# Patient Record
Sex: MALE | Race: WHITE | NOT HISPANIC OR LATINO | Employment: OTHER | URBAN - METROPOLITAN AREA
[De-identification: names, ages, dates, MRNs, and addresses within clinical notes are randomized per-mention and may not be internally consistent; named-entity substitution may affect disease eponyms.]

---

## 2024-08-30 ENCOUNTER — TRANSFERRED RECORDS (OUTPATIENT)
Dept: HEALTH INFORMATION MANAGEMENT | Facility: CLINIC | Age: 84
End: 2024-08-30
Payer: COMMERCIAL

## 2024-09-03 ENCOUNTER — TRANSFERRED RECORDS (OUTPATIENT)
Dept: HEALTH INFORMATION MANAGEMENT | Facility: CLINIC | Age: 84
End: 2024-09-03
Payer: COMMERCIAL

## 2024-09-05 ENCOUNTER — HOSPITAL ENCOUNTER (INPATIENT)
Facility: CLINIC | Age: 84
Setting detail: SURGERY ADMIT
DRG: 459 | End: 2024-09-05
Attending: NEUROLOGICAL SURGERY | Admitting: NEUROLOGICAL SURGERY
Payer: COMMERCIAL

## 2024-09-05 ENCOUNTER — HOSPITAL ENCOUNTER (INPATIENT)
Facility: CLINIC | Age: 84
LOS: 19 days | Discharge: NURSING, REHAB OR LONG-TERM CARE | DRG: 459 | End: 2024-09-24
Attending: NEUROLOGICAL SURGERY | Admitting: NEUROLOGICAL SURGERY
Payer: COMMERCIAL

## 2024-09-05 DIAGNOSIS — S22.079A CLOSED FRACTURE OF NINTH THORACIC VERTEBRA, UNSPECIFIED FRACTURE MORPHOLOGY, INITIAL ENCOUNTER (H): Primary | ICD-10-CM

## 2024-09-05 DIAGNOSIS — Z98.1 S/P FUSION OF THORACIC SPINE: Primary | ICD-10-CM

## 2024-09-05 DIAGNOSIS — E08.00 DIABETES MELLITUS DUE TO UNDERLYING CONDITION WITH HYPEROSMOLARITY WITHOUT COMA, WITHOUT LONG-TERM CURRENT USE OF INSULIN (H): ICD-10-CM

## 2024-09-05 PROBLEM — S22.000A THORACIC COMPRESSION FRACTURE (H): Status: ACTIVE | Noted: 2024-09-05

## 2024-09-05 LAB
ABO/RH(D): NORMAL
ANTIBODY SCREEN: NEGATIVE
APTT PPP: 29 SECONDS (ref 22–38)
FIBRINOGEN PPP-MCNC: 507 MG/DL (ref 170–510)
INR PPP: 1.02 (ref 0.85–1.15)
LACTATE SERPL-SCNC: 1.3 MMOL/L (ref 0.7–2)
SPECIMEN EXPIRATION DATE: NORMAL

## 2024-09-05 PROCEDURE — 83605 ASSAY OF LACTIC ACID: CPT | Performed by: NEUROLOGICAL SURGERY

## 2024-09-05 PROCEDURE — 86901 BLOOD TYPING SEROLOGIC RH(D): CPT

## 2024-09-05 PROCEDURE — 99222 1ST HOSP IP/OBS MODERATE 55: CPT | Mod: GC | Performed by: NEUROLOGICAL SURGERY

## 2024-09-05 PROCEDURE — 250N000013 HC RX MED GY IP 250 OP 250 PS 637

## 2024-09-05 PROCEDURE — 258N000003 HC RX IP 258 OP 636

## 2024-09-05 PROCEDURE — 85610 PROTHROMBIN TIME: CPT

## 2024-09-05 PROCEDURE — 86900 BLOOD TYPING SEROLOGIC ABO: CPT

## 2024-09-05 PROCEDURE — 120N000002 HC R&B MED SURG/OB UMMC

## 2024-09-05 PROCEDURE — 36415 COLL VENOUS BLD VENIPUNCTURE: CPT

## 2024-09-05 PROCEDURE — 85730 THROMBOPLASTIN TIME PARTIAL: CPT

## 2024-09-05 PROCEDURE — 85384 FIBRINOGEN ACTIVITY: CPT

## 2024-09-05 RX ORDER — NALOXONE HYDROCHLORIDE 0.4 MG/ML
0.2 INJECTION, SOLUTION INTRAMUSCULAR; INTRAVENOUS; SUBCUTANEOUS
Status: DISCONTINUED | OUTPATIENT
Start: 2024-09-05 | End: 2024-09-24 | Stop reason: HOSPADM

## 2024-09-05 RX ORDER — ACETAMINOPHEN 325 MG/1
975 TABLET ORAL EVERY 8 HOURS
Status: COMPLETED | OUTPATIENT
Start: 2024-09-05 | End: 2024-09-08

## 2024-09-05 RX ORDER — AMOXICILLIN 250 MG
1 CAPSULE ORAL 2 TIMES DAILY
Status: DISCONTINUED | OUTPATIENT
Start: 2024-09-05 | End: 2024-09-06

## 2024-09-05 RX ORDER — NALOXONE HYDROCHLORIDE 0.4 MG/ML
0.4 INJECTION, SOLUTION INTRAMUSCULAR; INTRAVENOUS; SUBCUTANEOUS
Status: DISCONTINUED | OUTPATIENT
Start: 2024-09-05 | End: 2024-09-24 | Stop reason: HOSPADM

## 2024-09-05 RX ORDER — PROCHLORPERAZINE MALEATE 5 MG
5 TABLET ORAL EVERY 6 HOURS PRN
Status: DISCONTINUED | OUTPATIENT
Start: 2024-09-05 | End: 2024-09-09

## 2024-09-05 RX ORDER — ONDANSETRON 2 MG/ML
4 INJECTION INTRAMUSCULAR; INTRAVENOUS EVERY 6 HOURS PRN
Status: DISCONTINUED | OUTPATIENT
Start: 2024-09-05 | End: 2024-09-09

## 2024-09-05 RX ORDER — ACETAMINOPHEN 325 MG/1
650 TABLET ORAL EVERY 4 HOURS PRN
Status: DISCONTINUED | OUTPATIENT
Start: 2024-09-08 | End: 2024-09-09

## 2024-09-05 RX ORDER — HYDRALAZINE HYDROCHLORIDE 20 MG/ML
10-20 INJECTION INTRAMUSCULAR; INTRAVENOUS EVERY 30 MIN PRN
Status: DISCONTINUED | OUTPATIENT
Start: 2024-09-05 | End: 2024-09-13

## 2024-09-05 RX ORDER — METHOCARBAMOL 500 MG/1
500 TABLET, FILM COATED ORAL EVERY 6 HOURS PRN
Status: DISCONTINUED | OUTPATIENT
Start: 2024-09-05 | End: 2024-09-06

## 2024-09-05 RX ORDER — ONDANSETRON 4 MG/1
4 TABLET, ORALLY DISINTEGRATING ORAL EVERY 6 HOURS PRN
Status: DISCONTINUED | OUTPATIENT
Start: 2024-09-05 | End: 2024-09-09

## 2024-09-05 RX ORDER — HYDROMORPHONE HCL IN WATER/PF 6 MG/30 ML
0.4 PATIENT CONTROLLED ANALGESIA SYRINGE INTRAVENOUS
Status: DISCONTINUED | OUTPATIENT
Start: 2024-09-05 | End: 2024-09-11

## 2024-09-05 RX ORDER — LABETALOL HYDROCHLORIDE 5 MG/ML
10-20 INJECTION, SOLUTION INTRAVENOUS EVERY 10 MIN PRN
Status: DISCONTINUED | OUTPATIENT
Start: 2024-09-05 | End: 2024-09-13

## 2024-09-05 RX ORDER — FAMOTIDINE 20 MG/1
20 TABLET, FILM COATED ORAL 2 TIMES DAILY PRN
Status: DISCONTINUED | OUTPATIENT
Start: 2024-09-05 | End: 2024-09-24 | Stop reason: HOSPADM

## 2024-09-05 RX ORDER — BISACODYL 10 MG
10 SUPPOSITORY, RECTAL RECTAL ONCE
Status: COMPLETED | OUTPATIENT
Start: 2024-09-05 | End: 2024-09-06

## 2024-09-05 RX ORDER — BISACODYL 10 MG
10 SUPPOSITORY, RECTAL RECTAL DAILY PRN
Status: DISCONTINUED | OUTPATIENT
Start: 2024-09-05 | End: 2024-09-07

## 2024-09-05 RX ORDER — OXYCODONE HYDROCHLORIDE 5 MG/1
5 TABLET ORAL EVERY 4 HOURS PRN
Status: DISCONTINUED | OUTPATIENT
Start: 2024-09-05 | End: 2024-09-09

## 2024-09-05 RX ORDER — BISACODYL 10 MG
10 SUPPOSITORY, RECTAL RECTAL DAILY PRN
Status: DISCONTINUED | OUTPATIENT
Start: 2024-09-08 | End: 2024-09-24 | Stop reason: HOSPADM

## 2024-09-05 RX ORDER — HYDROMORPHONE HCL IN WATER/PF 6 MG/30 ML
0.2 PATIENT CONTROLLED ANALGESIA SYRINGE INTRAVENOUS
Status: DISCONTINUED | OUTPATIENT
Start: 2024-09-05 | End: 2024-09-11

## 2024-09-05 RX ORDER — SODIUM CHLORIDE 9 MG/ML
INJECTION, SOLUTION INTRAVENOUS CONTINUOUS
Status: ACTIVE | OUTPATIENT
Start: 2024-09-05 | End: 2024-09-06

## 2024-09-05 RX ORDER — GABAPENTIN 100 MG/1
100 CAPSULE ORAL AT BEDTIME
Status: DISCONTINUED | OUTPATIENT
Start: 2024-09-05 | End: 2024-09-12

## 2024-09-05 RX ORDER — OXYCODONE HYDROCHLORIDE 10 MG/1
10 TABLET ORAL EVERY 4 HOURS PRN
Status: DISCONTINUED | OUTPATIENT
Start: 2024-09-05 | End: 2024-09-13

## 2024-09-05 RX ORDER — FAMOTIDINE 20 MG/1
20 TABLET, FILM COATED ORAL 2 TIMES DAILY
Status: DISCONTINUED | OUTPATIENT
Start: 2024-09-05 | End: 2024-09-05

## 2024-09-05 RX ORDER — POLYETHYLENE GLYCOL 3350 17 G/17G
17 POWDER, FOR SOLUTION ORAL DAILY
Status: DISCONTINUED | OUTPATIENT
Start: 2024-09-06 | End: 2024-09-06

## 2024-09-05 RX ADMIN — SODIUM CHLORIDE: 9 INJECTION, SOLUTION INTRAVENOUS at 21:31

## 2024-09-05 RX ADMIN — GABAPENTIN 100 MG: 100 CAPSULE ORAL at 23:57

## 2024-09-05 RX ADMIN — SENNOSIDES AND DOCUSATE SODIUM 1 TABLET: 8.6; 5 TABLET ORAL at 23:58

## 2024-09-05 RX ADMIN — OXYCODONE HYDROCHLORIDE 10 MG: 10 TABLET ORAL at 23:55

## 2024-09-05 RX ADMIN — ACETAMINOPHEN 975 MG: 325 TABLET ORAL at 23:38

## 2024-09-05 ASSESSMENT — ACTIVITIES OF DAILY LIVING (ADL)
ADLS_ACUITY_SCORE: 35

## 2024-09-05 NOTE — LETTER
Transition Communication Hand-off for Care Transitions to Next Level of Care Provider    Name: Judah Leon  : 1940  MRN #: [id number]  Primary Care Provider: [facility]     Primary Clinic: [address] [facility] [address]     Reason for Hospitalization:  Unstable Thoracic Fracture  Thoracic compression fracture (H)  Admit Date/Time: 2024  8:07 PM  Discharge Date: 2024  Payor Source: Payor: [facility] / Plan: [facility]N / Product Type: Indemnity /              Reason for Communication Hand-off Referral:   Notification of the discharge plan    Discharge Plan:          Care Management Discharge Note    Discharge Date: 2024       Discharge Disposition:  [facility] ([phone])    Discharge Services: Acute rehab placement at [facility]    Discharge DME:  Not applicable at this time      Discharge Transportation:    ANA spoke with pt's floor nurse (Leticia) who indicates that pt can transport via w/c, pt can pivot transfer, pt can maintain an upright seated position and pt does not require supervision for behavior during transport.  ANA arranged for [facility] ([employer] [phone]) to provide w/c transport with a service window  time of 12:08pm - 12:53pm    Private pay costs discussed: Not applicable at this time    Does the patient's insurance plan have a 3 day qualifying hospital stay waiver?  No    PAS Confirmation Code:   Not required as pt is admitting to acute rehab setting  Patient/family educated on Medicare website which has current facility and service quality ratings:  Yes    Education Provided on the Discharge Plan:  Yes  Persons Notified of Discharge Plans: Pt, pt's wife (Yolette),  pt's son (Eric), 6A nursing and Cash Crews NP  Patient/Family in Agreement with the Plan:  Yes    Handoff Referral Completed: Yes, non-[facility] PCP: External handoff communication completed    Additional Information:  - Cash Crews NP  "confirmed readiness for discharge  - Admissions ([place]) at [facility] confirmed acceptance for admit  - ANA spoke with Admissions at [facility] ([place]) who states that they are full on 9/24 and 9/25/2024 and thus are not able to accommodate pt for admit  - SW received \"Notice of Coverage\" approval from [facility] ([employer] processes prior approval requests for [employer]) for pt's acute rehab stay auth number: [id number].  ANA faxed a copy of the \"Notice of Coverage Approval \" to HIM for scanning into EPIC. ANA placed the original document in pt's [facility] chart.  SW provided a copy of the document to [facility] Admissions ([place]).    NANI Taveras  Social Work, 6A  Phone:  [phone]  Pager:  [phone]  9/24/2024       ERIC Moran    "

## 2024-09-06 ENCOUNTER — APPOINTMENT (OUTPATIENT)
Dept: CT IMAGING | Facility: CLINIC | Age: 84
DRG: 459 | End: 2024-09-06
Attending: NURSE PRACTITIONER
Payer: COMMERCIAL

## 2024-09-06 LAB
ANION GAP SERPL CALCULATED.3IONS-SCNC: 10 MMOL/L (ref 7–15)
BUN SERPL-MCNC: 25.6 MG/DL (ref 8–23)
CALCIUM SERPL-MCNC: 8.9 MG/DL (ref 8.8–10.4)
CHLORIDE SERPL-SCNC: 103 MMOL/L (ref 98–107)
CREAT SERPL-MCNC: 1.46 MG/DL (ref 0.67–1.17)
EGFRCR SERPLBLD CKD-EPI 2021: 47 ML/MIN/1.73M2
ERYTHROCYTE [DISTWIDTH] IN BLOOD BY AUTOMATED COUNT: 12.6 % (ref 10–15)
GLUCOSE BLDC GLUCOMTR-MCNC: 176 MG/DL (ref 70–99)
GLUCOSE BLDC GLUCOMTR-MCNC: 194 MG/DL (ref 70–99)
GLUCOSE BLDC GLUCOMTR-MCNC: 244 MG/DL (ref 70–99)
GLUCOSE SERPL-MCNC: 203 MG/DL (ref 70–99)
HCO3 SERPL-SCNC: 22 MMOL/L (ref 22–29)
HCT VFR BLD AUTO: 35.6 % (ref 40–53)
HGB BLD-MCNC: 12.3 G/DL (ref 13.3–17.7)
MAGNESIUM SERPL-MCNC: 1.7 MG/DL (ref 1.7–2.3)
MCH RBC QN AUTO: 33.2 PG (ref 26.5–33)
MCHC RBC AUTO-ENTMCNC: 34.6 G/DL (ref 31.5–36.5)
MCV RBC AUTO: 96 FL (ref 78–100)
PHOSPHATE SERPL-MCNC: 3.6 MG/DL (ref 2.5–4.5)
PLATELET # BLD AUTO: 151 10E3/UL (ref 150–450)
POTASSIUM SERPL-SCNC: 4.6 MMOL/L (ref 3.4–5.3)
RBC # BLD AUTO: 3.71 10E6/UL (ref 4.4–5.9)
SODIUM SERPL-SCNC: 135 MMOL/L (ref 135–145)
WBC # BLD AUTO: 7.3 10E3/UL (ref 4–11)

## 2024-09-06 PROCEDURE — 999N000128 HC STATISTIC PERIPHERAL IV START W/O US GUIDANCE

## 2024-09-06 PROCEDURE — 250N000013 HC RX MED GY IP 250 OP 250 PS 637: Performed by: NURSE PRACTITIONER

## 2024-09-06 PROCEDURE — 99231 SBSQ HOSP IP/OBS SF/LOW 25: CPT | Performed by: NURSE PRACTITIONER

## 2024-09-06 PROCEDURE — 36415 COLL VENOUS BLD VENIPUNCTURE: CPT

## 2024-09-06 PROCEDURE — 85027 COMPLETE CBC AUTOMATED: CPT

## 2024-09-06 PROCEDURE — 120N000002 HC R&B MED SURG/OB UMMC

## 2024-09-06 PROCEDURE — 80048 BASIC METABOLIC PNL TOTAL CA: CPT

## 2024-09-06 PROCEDURE — 83735 ASSAY OF MAGNESIUM: CPT

## 2024-09-06 PROCEDURE — 72128 CT CHEST SPINE W/O DYE: CPT | Mod: 26 | Performed by: RADIOLOGY

## 2024-09-06 PROCEDURE — 72128 CT CHEST SPINE W/O DYE: CPT

## 2024-09-06 PROCEDURE — 250N000013 HC RX MED GY IP 250 OP 250 PS 637

## 2024-09-06 PROCEDURE — 99222 1ST HOSP IP/OBS MODERATE 55: CPT | Performed by: PHYSICIAN ASSISTANT

## 2024-09-06 PROCEDURE — 250N000012 HC RX MED GY IP 250 OP 636 PS 637: Performed by: NURSE PRACTITIONER

## 2024-09-06 PROCEDURE — 93005 ELECTROCARDIOGRAM TRACING: CPT

## 2024-09-06 PROCEDURE — 84100 ASSAY OF PHOSPHORUS: CPT

## 2024-09-06 RX ORDER — GLIPIZIDE 5 MG/1
2.5 TABLET ORAL DAILY
Status: ON HOLD | COMMUNITY
End: 2024-09-19

## 2024-09-06 RX ORDER — AMITRIPTYLINE HYDROCHLORIDE 10 MG/1
10 TABLET ORAL AT BEDTIME
Status: DISCONTINUED | OUTPATIENT
Start: 2024-09-06 | End: 2024-09-24 | Stop reason: HOSPADM

## 2024-09-06 RX ORDER — BISACODYL 10 MG
10 SUPPOSITORY, RECTAL RECTAL ONCE
Status: COMPLETED | OUTPATIENT
Start: 2024-09-06 | End: 2024-09-06

## 2024-09-06 RX ORDER — MULTIPLE VITAMINS W/ MINERALS TAB 9MG-400MCG
1 TAB ORAL DAILY
Status: DISCONTINUED | OUTPATIENT
Start: 2024-09-06 | End: 2024-09-24 | Stop reason: HOSPADM

## 2024-09-06 RX ORDER — DEXTROSE MONOHYDRATE 25 G/50ML
25-50 INJECTION, SOLUTION INTRAVENOUS
Status: DISCONTINUED | OUTPATIENT
Start: 2024-09-06 | End: 2024-09-08

## 2024-09-06 RX ORDER — DIAZEPAM 5 MG
5 TABLET ORAL EVERY 6 HOURS PRN
Status: DISCONTINUED | OUTPATIENT
Start: 2024-09-06 | End: 2024-09-24 | Stop reason: HOSPADM

## 2024-09-06 RX ORDER — METHOCARBAMOL 500 MG/1
500 TABLET, FILM COATED ORAL 4 TIMES DAILY
Status: DISCONTINUED | OUTPATIENT
Start: 2024-09-06 | End: 2024-09-24 | Stop reason: HOSPADM

## 2024-09-06 RX ORDER — VITAMIN B COMPLEX
25 TABLET ORAL DAILY
Status: DISCONTINUED | OUTPATIENT
Start: 2024-09-06 | End: 2024-09-24 | Stop reason: HOSPADM

## 2024-09-06 RX ORDER — AMOXICILLIN 250 MG
2 CAPSULE ORAL 2 TIMES DAILY
Status: DISCONTINUED | OUTPATIENT
Start: 2024-09-06 | End: 2024-09-09

## 2024-09-06 RX ORDER — METFORMIN HCL 500 MG
500 TABLET, EXTENDED RELEASE 24 HR ORAL 2 TIMES DAILY WITH MEALS
Status: ON HOLD | COMMUNITY
End: 2024-09-19

## 2024-09-06 RX ORDER — POLYETHYLENE GLYCOL 3350 17 G/17G
17 POWDER, FOR SOLUTION ORAL 2 TIMES DAILY
Status: DISCONTINUED | OUTPATIENT
Start: 2024-09-06 | End: 2024-09-10

## 2024-09-06 RX ORDER — VITAMIN B COMPLEX
1 TABLET ORAL DAILY
Status: ON HOLD | COMMUNITY
End: 2024-09-19

## 2024-09-06 RX ORDER — SIMVASTATIN 40 MG
40 TABLET ORAL AT BEDTIME
Status: DISCONTINUED | OUTPATIENT
Start: 2024-09-06 | End: 2024-09-24 | Stop reason: HOSPADM

## 2024-09-06 RX ORDER — AMITRIPTYLINE HYDROCHLORIDE 10 MG/1
10 TABLET ORAL EVERY EVENING
Status: ON HOLD | COMMUNITY
End: 2024-09-19

## 2024-09-06 RX ORDER — METFORMIN HCL 500 MG
500 TABLET, EXTENDED RELEASE 24 HR ORAL 2 TIMES DAILY WITH MEALS
Status: DISCONTINUED | OUTPATIENT
Start: 2024-09-06 | End: 2024-09-24 | Stop reason: HOSPADM

## 2024-09-06 RX ORDER — MULTIPLE VITAMINS W/ MINERALS TAB 9MG-400MCG
1 TAB ORAL DAILY
Status: ON HOLD | COMMUNITY
End: 2024-09-19

## 2024-09-06 RX ORDER — SIMVASTATIN 40 MG
40 TABLET ORAL EVERY EVENING
Status: ON HOLD | COMMUNITY
End: 2024-09-19

## 2024-09-06 RX ORDER — NICOTINE POLACRILEX 4 MG
15-30 LOZENGE BUCCAL
Status: DISCONTINUED | OUTPATIENT
Start: 2024-09-06 | End: 2024-09-08

## 2024-09-06 RX ADMIN — INSULIN ASPART 1 UNITS: 100 INJECTION, SOLUTION INTRAVENOUS; SUBCUTANEOUS at 18:34

## 2024-09-06 RX ADMIN — ACETAMINOPHEN 975 MG: 325 TABLET ORAL at 16:04

## 2024-09-06 RX ADMIN — METHOCARBAMOL 500 MG: 500 TABLET ORAL at 20:21

## 2024-09-06 RX ADMIN — SIMVASTATIN 40 MG: 40 TABLET, FILM COATED ORAL at 21:49

## 2024-09-06 RX ADMIN — GABAPENTIN 100 MG: 100 CAPSULE ORAL at 21:49

## 2024-09-06 RX ADMIN — SENNOSIDES AND DOCUSATE SODIUM 2 TABLET: 8.6; 5 TABLET ORAL at 20:21

## 2024-09-06 RX ADMIN — SENNOSIDES AND DOCUSATE SODIUM 1 TABLET: 8.6; 5 TABLET ORAL at 07:43

## 2024-09-06 RX ADMIN — POLYETHYLENE GLYCOL 3350 17 G: 17 POWDER, FOR SOLUTION ORAL at 20:20

## 2024-09-06 RX ADMIN — POLYETHYLENE GLYCOL 3350 17 G: 17 POWDER, FOR SOLUTION ORAL at 08:58

## 2024-09-06 RX ADMIN — AMITRIPTYLINE HYDROCHLORIDE 10 MG: 10 TABLET, FILM COATED ORAL at 21:48

## 2024-09-06 RX ADMIN — BISACODYL 10 MG: 10 SUPPOSITORY RECTAL at 21:50

## 2024-09-06 RX ADMIN — METHOCARBAMOL 500 MG: 500 TABLET ORAL at 16:04

## 2024-09-06 RX ADMIN — ACETAMINOPHEN 975 MG: 325 TABLET ORAL at 07:43

## 2024-09-06 RX ADMIN — METHOCARBAMOL 500 MG: 500 TABLET ORAL at 12:03

## 2024-09-06 ASSESSMENT — ACTIVITIES OF DAILY LIVING (ADL)
ADLS_ACUITY_SCORE: 30
ADLS_ACUITY_SCORE: 31
ADLS_ACUITY_SCORE: 35
ADLS_ACUITY_SCORE: 31
ADLS_ACUITY_SCORE: 31
ADLS_ACUITY_SCORE: 30
ADLS_ACUITY_SCORE: 31
ADLS_ACUITY_SCORE: 43
ADLS_ACUITY_SCORE: 31
ADLS_ACUITY_SCORE: 43
ADLS_ACUITY_SCORE: 43
ADLS_ACUITY_SCORE: 31
ADLS_ACUITY_SCORE: 35
ADLS_ACUITY_SCORE: 35
ADLS_ACUITY_SCORE: 31
ADLS_ACUITY_SCORE: 31
ADLS_ACUITY_SCORE: 43
ADLS_ACUITY_SCORE: 31
ADLS_ACUITY_SCORE: 43
ADLS_ACUITY_SCORE: 30
ADLS_ACUITY_SCORE: 30
ADLS_ACUITY_SCORE: 31
ADLS_ACUITY_SCORE: 31

## 2024-09-06 NOTE — PLAN OF CARE
[facility]  Belongings/meds: with pt in room  2 RN Skin Assessment Completed by: Emir Bentley  Non-intact findings documented (yes/no/NA): bony heels, mepilex in place    Status: Admitted for unstable  T 8-9 fracture after a fall. PMHx of stage III CKD, diabetes, HTN, prostate cancer, anemia  Vitals: HTN, has PRN available for SBP <180  Neuros: A&O x4. Denies N/T. [place], tinnitus in R ear. 5/5 strengths throughout  IV: PIV infusing NS @ 75 ml/hr  Labs/Electrolytes: Triggered sepsis, Lactic acid 1.3  Resp/trach: on RA, denies SOB  Diet: Regular  Bowel status: LBM PTA, passing gas, suppository ordered but pt refused  : voiding spontaneously  Skin: intact except bony heels, mepilex in place    Pain: back and shoulder pain managed with schedule meds and PRN oxycodone  Activity: SBA/GB, TLSO brace on at all times  Plan: T 7-11 fusion Monday, 9/9 at 7:30AM. Continue with pain management

## 2024-09-06 NOTE — PROGRESS NOTES
[facility], [place]   Neurosurgery Progress Note:    Date of service: 9/6/2024    Assessment: Judah Leon is a 84 year old-year-old male with a notable PMHx of stage IIIb chronic kidney disease, diabetes, hypertension, history of prostate cancer, anemia, presenting with unstable T 8-9 fracture. Patient was in his usual state of health and was with his friend on Thursday. Mr. Leon saw his friend start to lose his balance, so he ran to help. Unfortunately, his friend ended up landing on top of the Mr. Leon and fractured his spine at T8-9. He denied loss of consciousness. He denies significant weakness, only endorses sharp pain that begins in his midback and radiates across his abdomen. He denies changes in sensation. He denies incontinence, bowel/bladder symptoms, and saddle anesthesia. [facility] for surgical management.      Plan:  - Neuro checks/vital signs: Every 4 hours  - Pain control  - Activity: TLSO brace at all times.  Okay to be up to chair only.  No waking   - Restrictions/Bracing: TLSO brace at all times  - Diet: Regular  - DVT prophylaxis: SCDs  - MRI Thoracic Spine w/wo today  - CT Thoracic spine today  - PT/OT: ordered  - Plan to obtain imaging from outside hospital  - Medicine pre-operative evaluation - appreciate recommendations  - Scheduled bowel regimen  - Electrolyte replacement protocol  - Daily labs  - Insulin sliding scale  - Platelets > 100,000  - INR < 1.5  - Hemoglobin > 8  - Tentative plan for OR on Monday for T7-11 Percutaneous Screws, possible additional levels with Dr. Anayeli Crews, APRN, CNP  Neurosurgery  Pager [phone]    Interval History:  Patient reports pain controlled with current regimen.  TLSO at all times. Medicine consult today. MRI and CT thoracic spine ordered.       Objective:   Temp:  [97.7  F (36.5  C)-98.4  F (36.9  C)] 97.9  F (36.6  C)  Pulse:  [80-95] 80  Resp:  [15-18]  16  BP: (148-175)/(92-99) 159/92  SpO2:  [91 %-98 %] 91 %  No intake/output data recorded.    Gen: Appears comfortable, NAD  Neurologic:  - Alert & Oriented to person, place, time, and situation  - Follows commands briskly  - Speech fluent, spontaneous. No aphasia or dysarthria.  - No gaze preference. No apparent hemineglect.  - PERRL, EOMI  - Strong eye closure, jaw clench, and cheek puff  - Face symmetric with sensation intact to light touch  - Palate elevates symmetrically, uvula midline, tongue protrudes midline  - Trapezii and sternocleidomastoid muscles 5/5 bilaterally  - No pronator drift     Del Tr Bi WE WF Gr   R 5 5 5 5 5 5   L 5 5 5 5 5 5    HF KE KF DF PF EHL   R 5 5 5 5 5 5   L 5 5 5 5 5 5   *exam components can be pain limited depending on acute pain episodes triggered with movements of his back   Reflexes 2+ throughout    Sensation intact and symmetric to light touch throughout    LABS  Recent Labs   Lab Test 09/06/24  0518   WBC 7.3   HGB 12.3*   MCV 96          Recent Labs   Lab Test 09/06/24  0518      POTASSIUM 4.6   CHLORIDE 103   CO2 22   BUN 25.6*   CR 1.46*   ANIONGAP 10   ROB 8.9   *       IMAGING    No results found for this or any previous visit (from the past 24 hour(s)).

## 2024-09-06 NOTE — PHARMACY-ADMISSION MEDICATION HISTORY
Pharmacist Admission Medication History    Admission medication history is complete. The information provided in this note is only as accurate as the sources available at the time of the update.    Information Source(s): Facility (U/NH/) medication list/MAR and CareEverywhere/SureScripts via N/A    Pertinent Information:   -Wife provided med list from OSH dated 10/17/23  -Reviewed Care Everywhere notes    Changes made to PTA medication list:  Added:   Everything on the list below was added  Deleted: None  Changed: None    Allergies reviewed with patient and updates made in EHR: no    Medication History Completed By: Bull Douglass [facility] 9/6/2024 1:37 PM    PTA Med List   Medication Sig Last Dose    amitriptyline (ELAVIL) 10 MG tablet Take 10 mg by mouth every evening.     glipiZIDE (GLUCOTROL) 5 MG tablet Take 2.5 mg by mouth daily.     metFORMIN (GLUCOPHAGE XR) 500 MG 24 hr tablet Take 500 mg by mouth 2 times daily (with meals).     multivitamin w/minerals (THERA-VIT-M) tablet Take 1 tablet by mouth daily.     simvastatin (ZOCOR) 40 MG tablet Take 40 mg by mouth every evening.     Vitamin D3 (CHOLECALCIFEROL) 25 mcg (1000 units) tablet Take 1 tablet by mouth daily.

## 2024-09-06 NOTE — PLAN OF CARE
Goal Outcome Evaluation:      Plan of Care Reviewed With: patient    Overall Patient Progress: no changeOverall Patient Progress: no change    Outcome Evaluation: CT needed this shift    [facility]. PMHx of stage IIIb chronic kidney disease, diabetes, hypertension, history of prostate cancer, and anemia. Neuros intact ex numbness to his feet that has been ongoing. PIV-Sl'd. Voiding in urinal. Has not had a bowel movement in a week per pt; bowel meds given; commode at bedside. Pain managed with Robaxin& tylenol this shift. Up w/ A1/GB;Okay to transfer to chair or commode with assist.  TLSO brace on at all times. No walking. Up in chair x1 this shift. CT completed this shift. MRI needed this evening valium to be given 30 mins prior to MRI.

## 2024-09-06 NOTE — PLAN OF CARE
Status: Unstable T8-9 fracture. PMHx: stage III CKD, DM, HTN, prostate cancer, anemia.  Vitals: VSS on RA  Neuros: Numbness to bilat feet that pt reports is baseline. 5/5 strengths thru out.  IV: PIV SL  Labs/Electrolytes: BG ACHS  Resp/trach: WNL - denies SOB.  Diet: Reg - good intake.  Bowel status: Per pt report: hasn't had BM for a week. Sched bowel meds & suppository given this gualberto.   : Voids spont to bedside urinal  Skin: Mild redness - blanchable to coccyx. Bruising thru out. Wears TLSO at all times as ordered.  Pain: 4/10 back pain - trx w/ sched meds.  Activity: A1/GB/FWW to chair & commode - no walking per order.  Social: Family at bedside thru out gualberto & supportive.  Plan: [facility] - unable to complete this gualberto d/t scheduling limitations per MRI.  Education completed: Pressure ulcer prevention and repositioning strategies.    Goal Outcome Evaluation:      Plan of Care Reviewed With: patient    Overall Patient Progress: no change    Outcome Evaluation: MRI needed this shift.

## 2024-09-06 NOTE — H&P
"[facility]         NEUROSURGERY HISTORY AND PHYSICAL    HPI:  Judah Leon is a 84 year old-year-old male with a notable PMHx of stage IIIb chronic kidney disease, diabetes, hypertension, history of prostate cancer, anemia, presenting with unstable T 8-9 fracture.    Patient was in his usual state of health and was with his friend on Thursday. Mr. Leon saw his friend start to lose his balance, so he ran to help. Unfortunately, his friend ended up landing on top of the Mr. Leon and fractured his spine at T8-9. He denied loss of consciousness. He denies significant weakness, only endorses sharp pain that begins in his midback and radiates across his abdomen. He denies changes in sensation. He denies incontinence, bowel/bladder symptoms, and saddle anesthesia. [facility] for surgical management.    ROS: 10 point ROS in other systems negative other than noted in HPI.    RELEVANT HISTORY:  PAST MEDICAL HISTORY: No past medical history on file.    PAST SURGICAL HISTORY: No past surgical history on file.    FAMILY HISTORY: No family history on file.    SOCIAL HISTORY:   Social History     Tobacco Use    Smoking status: Not on file    Smokeless tobacco: Not on file   Substance Use Topics    Alcohol use: Not on file       MEDICATIONS:  No current outpatient medications on file.       Allergies:  No Known Allergies    IMAGING:  No results found for this or any previous visit (from the past 24 hour(s)).    LABS:   Last Comprehensive Metabolic Panel:  Lab Results   Component Value Date     09/06/2024    POTASSIUM 4.6 09/06/2024    CHLORIDE 103 09/06/2024    CO2 22 09/06/2024    ANIONGAP 10 09/06/2024     (H) 09/06/2024    BUN 25.6 (H) 09/06/2024    CR 1.46 (H) 09/06/2024    GFRESTIMATED 47 (L) 09/06/2024    ROB 8.9 09/06/2024         No results found for: \"WBC\"  No results found for: \"RBC\"  No results found " "for: \"HGB\"  No results found for: \"HCT\"  No results found for: \"MCV\"  No results found for: \"MCH\"  No results found for: \"MCHC\"  No results found for: \"RDW\"  No results found for: \"PLT\"  INR   Date Value Ref Range Status   09/05/2024 1.02 0.85 - 1.15 Final      aPTT   Date Value Ref Range Status   09/05/2024 29 22 - 38 Seconds Final        PHYSICAL EXAM:  Blood pressure (!) 159/92, pulse 80, temperature 97.9  F (36.6  C), temperature source Oral, resp. rate 16, SpO2 91%.  General: Resting in bed, in no acute distress, in TLSO brace  HEENT: Atraumatic, normocephalic  PULM: Breathing comfortably on room air  MSK: No gross deformities  : rectal tone deferred  NEUROLOGIC:  -- Awake; Alert; oriented x 3  -- Follows commands briskly  -- Speech fluent, spontaneous. No aphasia or dysarthria.  -- no gaze preference. No apparent hemineglect.  Cranial Nerves:  -- visual fields full to confrontation, PERRL 3-2mm bilat and brisk, extraocular movements intact  -- face symmetrical, tongue midline  -- sensory V1-V3 intact bilaterally  -- palate elevates symmetrically, uvula midline  -- hearing grossly intact bilat  -- Trapezii 5/5 strength bilat symmetric    Motor:  Normal bulk / tone; no tremor, rigidity, or bradykinesia.  No muscle wasting or fasciculations  No Pronator Drift     Delt Bi Tri Hand Flexion/  Extension Iliopsoas Quadriceps Hamstrings Tibialis Anterior Gastroc    C5 C6 C7 C8/T1 L2 L3 L4-S1 L4 S1   R 5 5 5 5 5 5 5 5 5   L 5 5 5 5 5 5 5 5 5   *exam components can be pain limited depending on acute pain episodes triggered with movements of his back      Sensory:  intact to LT x 4 extremities - abdomen was not tested as patient had intense pain with manipulation of the TLSO brace, though patient reported that there was no difference before the brace was put on 2 days prior    Reflexes: no clonus       Bi Tri BR Steve Pat Ach Bab     C5-6 C7-8 C6 UMN L2-4 S1 UMN   R 2+ 2+ 2+ Norm 2+ 2+ Norm   L 2+ 2+ 2+ Norm 2+ 2+ Norm "      Gait: deferred     ASSESSMENT:  Judah Leon is a 84 year old-year-old male with a notable PMHx of stage IIIb chronic kidney disease, diabetes, hypertension, history of prostate cancer, anemia, presenting with unstable T 8-9 fracture.    PLAN:  -- OR on Monday for T7-11 Percutaneous Screws, possible additional levels  -- TLSO on at all times  -- Obtain imaging from outside hospital  -- Medicine pre-operative evaluation  -- Pain control  -- SBP < 180  -- Supplemental oxygen PRN  -- Incentive spirometry Q1H while awake  -- Continuous pulse oximetry  -- Advance diet as tolerated to regular  -- Scheduled bowel regimen  -- Electrolyte replacement protocol  -- Glucose < 180    -- Continue glucose checks  -- Platelets > 100,000  -- INR < 1.5  -- Hemoglobin > 8  -- DVT: SCDs while in bed  -- Disposition: Floor  -- PT/OT consulted    The patient's presentation, exam, and imaging and the listed recommendations/plan were discussed with Dr. Gama, neurosurgery chief resident, and Dr. Guadalupe, neurosurgery staff.    Ranjith Hernandez MD  Department of Neurosurgery  [facility]

## 2024-09-06 NOTE — CONSULTS
[facility]  Consult Note - Hospitalist Service, GOLD TEAM   Date of Admission:  9/5/2024  Consult Requested by: Dr. Hernandez  Reason for Consult: Pre-operative evaluation    Assessment & Plan   Judah Leon is a 84 year old man with a history of CKD, HTN, HLD, DM2, hemorrhagic CVA (1997), prostate cancer, who was admitted to Neurosurgery service on 9/5/24 with unstable T8-9 fracture. Medicine consulted today for pre-operative evaluation.     Traumatic unstable T8-9 fracture  Mechanical fall  Vertigo  Having intermittent episodes of vertigo for at least the past year. [facility] PCP recently started amitriptyline. Now presents with unstable T8-9 fracture after a mechanical fall during a bout of dizziness.   - Management per Neurosurgery primary team   - Planning for OR on 9/9   - Consider resuming PTA amitriptyline    Preoperative risk assessment: EKG with NSR, no ischemic changes. Reviewed most recent echo from 2019 which showed LVEf 60%, no inducible ischemic on exercise stress. Functional capacity is >4 METs without symptoms. Patient's cardiac risk by revised cardiac risk index is 1.0%. ACS NSQIP lists their  cardiac risk similarly at 1.6%. Their risk of any complication is 17.7%, serious complications 14.2%, death 2.3%. They have not not required urgent intubation in the past.  It is certainly possible that they might require a longer time to recover from anesthesia than normal. Patient is currently euvolemic and is not on supplemental oxygen.    -  I do not think that any further testing is indicated at this time as all of their chronic issues seem to be maximized.    - I discussed my recommendations for no further testing or change in treatment prior to proceeding with surgery.     DM2: A1C 7.7. PTA on metformin 500 mg BID, glipizide 2.5 mg daily,    - Hold glipizide, metformin while inpatient and resume upon discharge   - Low intensity sliding  scale aspart   - BG checks TID ac, at bedtime, 0200   - Hypoglycemia protocol    Hx of hypertension: No longer on antihypertensive medications. Blood pressure currently elevated in setting of pain and significant anxiety.   - Would hold on initiating scheduled antihypertensives so long as SBP remaining <170 and focus on pain/anxiety mgmt.     CKD IIIb: Baseline Cr ~1.6 - 1.7, currently stable. Monitor BMP daily.     HLD: Continue PTA simvastatin 40 mg at bedtime.              Recommendations discussed with Cash Crews CNP. Medicine will sign off. Please do not hesitate to contact if new questions or concerns arise.       Clinically Significant Risk Factors Present on Admission                                           Sherry Amanda PA-C  Hospitalist Service, GOLD TEAM   Securely message with [employer] (more info)  Text page via [facility] Paging/Directory   See signed in provider for up to date coverage information  ______________________________________________________________________    Chief Complaint   Back pain    History is obtained from the patient    History of Present Illness   Judah Leon is a 84 year old man with a history of CKD, HTN, HLD, DM2, prostate cancer, who was admitted to Neurosurgery service on 9/5/24 with unstable T8-9 fracture. Medicine consulted today for pre-operative evaluation.     His primary concern is ability to tolerate today's spinal MRI. He has a history of significant claustrophobia and anxiety with MRI in the past. He is feeling very worried about this.     Having ongoing back pain. Getting relief with PRN pain medications.     Otherwise had been feeling well up until his fall with no recent illness, fevers. He is independent and active at baseline. No chest pain, dyspnea, or exertional symptoms. No prior history of issues with anesthesia. No personal or known family history of blood clots. Had a brain bleed of unknown etiology (? Aneurysm) >10 years ago. No other known history  of strokes or cardiovascular disease. He notes he has been off blood pressure meds for awhile and often gets white coat hypertension.      Past Medical History    No past medical history on file.    Past Surgical History   No past surgical history on file.    Medications   No medications prior to admission.          Physical Exam   Vital Signs: Temp: 97.9  F (36.6  C) Temp src: Oral BP: (!) 159/92 Pulse: 80   Resp: 16 SpO2: 91 % O2 Device: None (Room air)    Weight: 0 lbs 0 oz    Constitutional: Awake and alert, in no apparent distress. Supine in bed with brace on.  Eyes: Sclera clear, anicteric   Respiratory: Breathing comfortably on room air. Clear to auscultation bilaterally with no crackles, wheezing, or rhonchi. Good air entry throughout.   Cardiovascular:  RRR, normal S1/S2. No rubs or murmurs. Intact bilateral pedal pulses. No peripheral edema.   GI: Soft, non-tender, non-distended. Normoactive bowel sounds.   Skin:  Good color. No jaundice. No visible rashes, lesions, or bruising of concern.   Neurologic: Alert and fully oriented. No tremor. Facies symmetric. Speech clear.       Medical Decision Making       60 MINUTES SPENT BY ME on the date of service doing chart review, history, exam, documentation & further activities per the note.      Data   ROUTINE IP LABS (Last four results)  Recent Labs   Lab 09/06/24  0518      POTASSIUM 4.6   CHLORIDE 103   CO2 22   ANIONGAP 10   *   BUN 25.6*   CR 1.46*   ROB 8.9   MAG 1.7   PHOS 3.6     Recent Labs   Lab 09/06/24  0518   WBC 7.3   RBC 3.71*   HGB 12.3*   HCT 35.6*   MCV 96   MCH 33.2*   MCHC 34.6   RDW 12.6        Recent Labs   Lab 09/05/24  2100   INR 1.02            Latest Ref Rng & Units 9/6/2024     5:18 AM   Glucose Values   Glucose 70 - 99 mg/dL 203

## 2024-09-07 LAB
ANION GAP SERPL CALCULATED.3IONS-SCNC: 11 MMOL/L (ref 7–15)
BUN SERPL-MCNC: 28.7 MG/DL (ref 8–23)
CALCIUM SERPL-MCNC: 9.4 MG/DL (ref 8.8–10.4)
CHLORIDE SERPL-SCNC: 105 MMOL/L (ref 98–107)
CREAT SERPL-MCNC: 1.5 MG/DL (ref 0.67–1.17)
EGFRCR SERPLBLD CKD-EPI 2021: 45 ML/MIN/1.73M2
ERYTHROCYTE [DISTWIDTH] IN BLOOD BY AUTOMATED COUNT: 12.7 % (ref 10–15)
GLUCOSE BLDC GLUCOMTR-MCNC: 203 MG/DL (ref 70–99)
GLUCOSE BLDC GLUCOMTR-MCNC: 219 MG/DL (ref 70–99)
GLUCOSE BLDC GLUCOMTR-MCNC: 225 MG/DL (ref 70–99)
GLUCOSE BLDC GLUCOMTR-MCNC: 268 MG/DL (ref 70–99)
GLUCOSE BLDC GLUCOMTR-MCNC: 370 MG/DL (ref 70–99)
GLUCOSE SERPL-MCNC: 207 MG/DL (ref 70–99)
HCO3 SERPL-SCNC: 21 MMOL/L (ref 22–29)
HCT VFR BLD AUTO: 40.3 % (ref 40–53)
HGB BLD-MCNC: 13.6 G/DL (ref 13.3–17.7)
MAGNESIUM SERPL-MCNC: 1.9 MG/DL (ref 1.7–2.3)
MCH RBC QN AUTO: 32.8 PG (ref 26.5–33)
MCHC RBC AUTO-ENTMCNC: 33.7 G/DL (ref 31.5–36.5)
MCV RBC AUTO: 97 FL (ref 78–100)
PHOSPHATE SERPL-MCNC: 3.6 MG/DL (ref 2.5–4.5)
PLATELET # BLD AUTO: 158 10E3/UL (ref 150–450)
POTASSIUM SERPL-SCNC: 4.5 MMOL/L (ref 3.4–5.3)
RBC # BLD AUTO: 4.15 10E6/UL (ref 4.4–5.9)
SODIUM SERPL-SCNC: 137 MMOL/L (ref 135–145)
WBC # BLD AUTO: 8 10E3/UL (ref 4–11)

## 2024-09-07 PROCEDURE — A9585 GADOBUTROL INJECTION: HCPCS | Performed by: NURSE PRACTITIONER

## 2024-09-07 PROCEDURE — 85027 COMPLETE CBC AUTOMATED: CPT

## 2024-09-07 PROCEDURE — 250N000013 HC RX MED GY IP 250 OP 250 PS 637: Performed by: NURSE PRACTITIONER

## 2024-09-07 PROCEDURE — 84100 ASSAY OF PHOSPHORUS: CPT

## 2024-09-07 PROCEDURE — 250N000011 HC RX IP 250 OP 636

## 2024-09-07 PROCEDURE — 255N000002 HC RX 255 OP 636: Performed by: NURSE PRACTITIONER

## 2024-09-07 PROCEDURE — 36415 COLL VENOUS BLD VENIPUNCTURE: CPT

## 2024-09-07 PROCEDURE — 80048 BASIC METABOLIC PNL TOTAL CA: CPT

## 2024-09-07 PROCEDURE — 250N000013 HC RX MED GY IP 250 OP 250 PS 637

## 2024-09-07 PROCEDURE — 83735 ASSAY OF MAGNESIUM: CPT

## 2024-09-07 PROCEDURE — 120N000002 HC R&B MED SURG/OB UMMC

## 2024-09-07 RX ORDER — GADOBUTROL 604.72 MG/ML
0.1 INJECTION INTRAVENOUS ONCE
Status: COMPLETED | OUTPATIENT
Start: 2024-09-07 | End: 2024-09-07

## 2024-09-07 RX ORDER — DIAZEPAM 10 MG/2ML
2.5 INJECTION, SOLUTION INTRAMUSCULAR; INTRAVENOUS ONCE
Status: COMPLETED | OUTPATIENT
Start: 2024-09-07 | End: 2024-09-07

## 2024-09-07 RX ADMIN — OXYCODONE HYDROCHLORIDE 10 MG: 10 TABLET ORAL at 18:11

## 2024-09-07 RX ADMIN — Medication 25 MCG: at 08:03

## 2024-09-07 RX ADMIN — GADOBUTROL 7.4 ML: 604.72 INJECTION INTRAVENOUS at 10:47

## 2024-09-07 RX ADMIN — GABAPENTIN 100 MG: 100 CAPSULE ORAL at 22:06

## 2024-09-07 RX ADMIN — METHOCARBAMOL 500 MG: 500 TABLET ORAL at 16:33

## 2024-09-07 RX ADMIN — AMITRIPTYLINE HYDROCHLORIDE 10 MG: 10 TABLET, FILM COATED ORAL at 22:06

## 2024-09-07 RX ADMIN — INSULIN ASPART 5 UNITS: 100 INJECTION, SOLUTION INTRAVENOUS; SUBCUTANEOUS at 18:11

## 2024-09-07 RX ADMIN — ACETAMINOPHEN 975 MG: 325 TABLET ORAL at 16:34

## 2024-09-07 RX ADMIN — METHOCARBAMOL 500 MG: 500 TABLET ORAL at 08:03

## 2024-09-07 RX ADMIN — SENNOSIDES AND DOCUSATE SODIUM 2 TABLET: 8.6; 5 TABLET ORAL at 20:09

## 2024-09-07 RX ADMIN — INSULIN ASPART 2 UNITS: 100 INJECTION, SOLUTION INTRAVENOUS; SUBCUTANEOUS at 08:03

## 2024-09-07 RX ADMIN — Medication 1 TABLET: at 08:03

## 2024-09-07 RX ADMIN — DIAZEPAM 2.5 MG: 5 INJECTION INTRAMUSCULAR; INTRAVENOUS at 10:36

## 2024-09-07 RX ADMIN — SIMVASTATIN 40 MG: 40 TABLET, FILM COATED ORAL at 22:06

## 2024-09-07 RX ADMIN — METHOCARBAMOL 500 MG: 500 TABLET ORAL at 20:09

## 2024-09-07 RX ADMIN — POLYETHYLENE GLYCOL 3350 17 G: 17 POWDER, FOR SOLUTION ORAL at 20:11

## 2024-09-07 RX ADMIN — ACETAMINOPHEN 975 MG: 325 TABLET ORAL at 08:03

## 2024-09-07 RX ADMIN — DIAZEPAM 5 MG: 5 TABLET ORAL at 13:29

## 2024-09-07 RX ADMIN — INSULIN ASPART 2 UNITS: 100 INJECTION, SOLUTION INTRAVENOUS; SUBCUTANEOUS at 12:03

## 2024-09-07 RX ADMIN — METHOCARBAMOL 500 MG: 500 TABLET ORAL at 12:03

## 2024-09-07 RX ADMIN — HYDROMORPHONE HYDROCHLORIDE 0.4 MG: 0.2 INJECTION, SOLUTION INTRAMUSCULAR; INTRAVENOUS; SUBCUTANEOUS at 14:16

## 2024-09-07 RX ADMIN — ACETAMINOPHEN 975 MG: 325 TABLET ORAL at 00:10

## 2024-09-07 RX ADMIN — HYDROMORPHONE HYDROCHLORIDE 0.4 MG: 0.2 INJECTION, SOLUTION INTRAMUSCULAR; INTRAVENOUS; SUBCUTANEOUS at 20:25

## 2024-09-07 ASSESSMENT — ACTIVITIES OF DAILY LIVING (ADL)
ADLS_ACUITY_SCORE: 31

## 2024-09-07 NOTE — PROGRESS NOTES
[facility], [place]   Neurosurgery Progress Note:    Date of service: 9/6/2024    Assessment: Judah Leon is a 84 year old-year-old male with a notable PMHx of stage IIIb chronic kidney disease, diabetes, hypertension, history of prostate cancer, anemia, presenting with unstable T 8-9 fracture. He is neurologically intact. [facility] for surgical management.    Plan:  - Neuro checks/vital signs: Every 4 hours  - Pain control  - Activity: TLSO brace at all times.  Okay to be up to chair only.  No waking   - Restrictions/Bracing: TLSO brace at all times  - Diet: Regular  - DVT prophylaxis: SCDs  - MRI Thoracic Spine w/wo   - PT/OT: ordered  - Plan to obtain imaging from outside hospital  - Medicine pre-operative evaluation - appreciate recommendations  - Scheduled bowel regimen  - Electrolyte replacement protocol  - Daily labs  - Insulin sliding scale  - Platelets > 100,000  - INR < 1.5  - Hemoglobin > 8  - Tentative plan for OR on Monday for T7-11 Percutaneous Screws, possible additional levels with Dr. Guadalupe    Discussed with Dr. Shah, weekend staff.       Homer Dixon MD  Neurosurgery  Pager: [phone]     Interval History:  No acute events overnight. Patient reports some confusion. MRI today planned.       Objective:   Temp:  [97.7  F (36.5  C)-98.4  F (36.9  C)] 97.7  F (36.5  C)  Pulse:  [76-84] 76  Resp:  [16-20] 16  BP: (107-166)/(72-94) 143/90  SpO2:  [93 %-98 %] 98 %  I/O last 3 completed shifts:  In: 240 [P.O.:240]  Out: 1000 [Urine:1000]    Gen: Appears comfortable, NAD  Neurologic:  - Alert & Oriented to person, place, time, and situation  - Follows commands briskly  - Speech fluent, spontaneous. No aphasia or dysarthria.  - No gaze preference. No apparent hemineglect.  - PERRL, EOMI  - Strong eye closure, jaw clench, and cheek puff  - Face symmetric with sensation intact to light touch  - Palate elevates symmetrically, uvula  midline, tongue protrudes midline  - Trapezii and sternocleidomastoid muscles 5/5 bilaterally  - No pronator drift     Del Tr Bi WE WF Gr   R 5 5 5 5 5 5   L 5 5 5 5 5 5    HF KE KF DF PF EHL   R 5 5 5 5 5 5   L 5 5 5 5 5 5   *exam components can be pain limited depending on acute pain episodes triggered with movements of his back   Reflexes 2+ throughout    Sensation intact and symmetric to light touch throughout    LABS  Recent Labs   Lab Test 09/07/24 0717 09/06/24  0518   WBC 8.0 7.3   HGB 13.6 12.3*   MCV 97 96    151       Recent Labs   Lab Test 09/07/24  0803 09/07/24  0717 09/07/24  0450 09/06/24  1206 09/06/24  0518   NA  --  137  --   --  135   POTASSIUM  --  4.5  --   --  4.6   CHLORIDE  --  105  --   --  103   CO2  --  21*  --   --  22   BUN  --  28.7*  --   --  25.6*   CR  --  1.50*  --   --  1.46*   ANIONGAP  --  11  --   --  10   ROB  --  9.4  --   --  8.9   * 207* 203*   < > 203*    < > = values in this interval not displayed.       IMAGING    No results found for this or any previous visit (from the past 24 hour(s)).

## 2024-09-07 NOTE — PLAN OF CARE
Goal Outcome Evaluation:      Plan of Care Reviewed With: patient    Overall Patient Progress: no changeOverall Patient Progress: no change           Status: Unstable T8-9 fracture. PMHx: stage III CKD, DM, HTN, prostate cancer, anemia   Activity: Assist x1 with gaitbelt walker, to commode and chair, no walking. Needs assistance when repositioning in bed at times.  Neuro: Intact, AnO x4, 5/5 throughout  Cardiac: WDL, HTN  Respiratory: WDL, on RA  GI/: Voiding spontaneously, had x1 BM overnight, passing gas  Diet: Regular   Skin: Blanchable redness on coccyx  Lines/Drains: x2 (R) PIVs  Pain/Nausea: 4/10 back pain managed with sched tylenol  Changes:  MRI today

## 2024-09-08 ENCOUNTER — ANESTHESIA EVENT (OUTPATIENT)
Dept: SURGERY | Facility: CLINIC | Age: 84
DRG: 459 | End: 2024-09-08
Payer: COMMERCIAL

## 2024-09-08 LAB
ABO/RH(D): NORMAL
ANION GAP SERPL CALCULATED.3IONS-SCNC: 13 MMOL/L (ref 7–15)
ANTIBODY SCREEN: NEGATIVE
APTT PPP: 29 SECONDS (ref 22–38)
ATRIAL RATE - MUSE: 80 BPM
BUN SERPL-MCNC: 31.6 MG/DL (ref 8–23)
CALCIUM SERPL-MCNC: 9.1 MG/DL (ref 8.8–10.4)
CHLORIDE SERPL-SCNC: 105 MMOL/L (ref 98–107)
CREAT SERPL-MCNC: 1.32 MG/DL (ref 0.67–1.17)
DIASTOLIC BLOOD PRESSURE - MUSE: NORMAL MMHG
EGFRCR SERPLBLD CKD-EPI 2021: 53 ML/MIN/1.73M2
ERYTHROCYTE [DISTWIDTH] IN BLOOD BY AUTOMATED COUNT: 12.6 % (ref 10–15)
GLUCOSE BLDC GLUCOMTR-MCNC: 225 MG/DL (ref 70–99)
GLUCOSE BLDC GLUCOMTR-MCNC: 234 MG/DL (ref 70–99)
GLUCOSE BLDC GLUCOMTR-MCNC: 239 MG/DL (ref 70–99)
GLUCOSE BLDC GLUCOMTR-MCNC: 277 MG/DL (ref 70–99)
GLUCOSE BLDC GLUCOMTR-MCNC: 288 MG/DL (ref 70–99)
GLUCOSE BLDC GLUCOMTR-MCNC: 292 MG/DL (ref 70–99)
GLUCOSE SERPL-MCNC: 258 MG/DL (ref 70–99)
HCO3 SERPL-SCNC: 19 MMOL/L (ref 22–29)
HCT VFR BLD AUTO: 38 % (ref 40–53)
HGB BLD-MCNC: 12.5 G/DL (ref 13.3–17.7)
INR PPP: 1.1 (ref 0.85–1.15)
INTERPRETATION ECG - MUSE: NORMAL
MAGNESIUM SERPL-MCNC: 1.9 MG/DL (ref 1.7–2.3)
MCH RBC QN AUTO: 32.3 PG (ref 26.5–33)
MCHC RBC AUTO-ENTMCNC: 32.9 G/DL (ref 31.5–36.5)
MCV RBC AUTO: 98 FL (ref 78–100)
P AXIS - MUSE: 52 DEGREES
PHOSPHATE SERPL-MCNC: 3.7 MG/DL (ref 2.5–4.5)
PLATELET # BLD AUTO: 144 10E3/UL (ref 150–450)
POTASSIUM SERPL-SCNC: 4.4 MMOL/L (ref 3.4–5.3)
PR INTERVAL - MUSE: 154 MS
QRS DURATION - MUSE: 74 MS
QT - MUSE: 362 MS
QTC - MUSE: 417 MS
R AXIS - MUSE: -19 DEGREES
RBC # BLD AUTO: 3.87 10E6/UL (ref 4.4–5.9)
SODIUM SERPL-SCNC: 137 MMOL/L (ref 135–145)
SPECIMEN EXPIRATION DATE: NORMAL
SYSTOLIC BLOOD PRESSURE - MUSE: NORMAL MMHG
T AXIS - MUSE: 6 DEGREES
VENTRICULAR RATE- MUSE: 80 BPM
WBC # BLD AUTO: 6.9 10E3/UL (ref 4–11)

## 2024-09-08 PROCEDURE — 80048 BASIC METABOLIC PNL TOTAL CA: CPT

## 2024-09-08 PROCEDURE — 85730 THROMBOPLASTIN TIME PARTIAL: CPT | Performed by: STUDENT IN AN ORGANIZED HEALTH CARE EDUCATION/TRAINING PROGRAM

## 2024-09-08 PROCEDURE — 86900 BLOOD TYPING SEROLOGIC ABO: CPT | Performed by: STUDENT IN AN ORGANIZED HEALTH CARE EDUCATION/TRAINING PROGRAM

## 2024-09-08 PROCEDURE — 83735 ASSAY OF MAGNESIUM: CPT

## 2024-09-08 PROCEDURE — 250N000013 HC RX MED GY IP 250 OP 250 PS 637: Performed by: NURSE PRACTITIONER

## 2024-09-08 PROCEDURE — 85027 COMPLETE CBC AUTOMATED: CPT

## 2024-09-08 PROCEDURE — 250N000013 HC RX MED GY IP 250 OP 250 PS 637

## 2024-09-08 PROCEDURE — 86901 BLOOD TYPING SEROLOGIC RH(D): CPT | Performed by: STUDENT IN AN ORGANIZED HEALTH CARE EDUCATION/TRAINING PROGRAM

## 2024-09-08 PROCEDURE — 120N000002 HC R&B MED SURG/OB UMMC

## 2024-09-08 PROCEDURE — 250N000013 HC RX MED GY IP 250 OP 250 PS 637: Performed by: NEUROLOGICAL SURGERY

## 2024-09-08 PROCEDURE — 36415 COLL VENOUS BLD VENIPUNCTURE: CPT | Performed by: STUDENT IN AN ORGANIZED HEALTH CARE EDUCATION/TRAINING PROGRAM

## 2024-09-08 PROCEDURE — 85610 PROTHROMBIN TIME: CPT | Performed by: STUDENT IN AN ORGANIZED HEALTH CARE EDUCATION/TRAINING PROGRAM

## 2024-09-08 PROCEDURE — 84100 ASSAY OF PHOSPHORUS: CPT

## 2024-09-08 PROCEDURE — 36415 COLL VENOUS BLD VENIPUNCTURE: CPT

## 2024-09-08 RX ORDER — MAGNESIUM OXIDE 400 MG/1
400 TABLET ORAL EVERY 4 HOURS
Status: COMPLETED | OUTPATIENT
Start: 2024-09-08 | End: 2024-09-08

## 2024-09-08 RX ORDER — NICOTINE POLACRILEX 4 MG
15-30 LOZENGE BUCCAL
Status: DISCONTINUED | OUTPATIENT
Start: 2024-09-08 | End: 2024-09-19

## 2024-09-08 RX ORDER — DEXTROSE MONOHYDRATE 25 G/50ML
25-50 INJECTION, SOLUTION INTRAVENOUS
Status: DISCONTINUED | OUTPATIENT
Start: 2024-09-08 | End: 2024-09-19

## 2024-09-08 RX ADMIN — MAGNESIUM OXIDE TAB 400 MG (241.3 MG ELEMENTAL MG) 400 MG: 400 (241.3 MG) TAB at 08:24

## 2024-09-08 RX ADMIN — METHOCARBAMOL 500 MG: 500 TABLET ORAL at 15:46

## 2024-09-08 RX ADMIN — SIMVASTATIN 40 MG: 40 TABLET, FILM COATED ORAL at 21:49

## 2024-09-08 RX ADMIN — ACETAMINOPHEN 975 MG: 325 TABLET ORAL at 03:31

## 2024-09-08 RX ADMIN — ACETAMINOPHEN 975 MG: 325 TABLET ORAL at 15:45

## 2024-09-08 RX ADMIN — SENNOSIDES AND DOCUSATE SODIUM 2 TABLET: 8.6; 5 TABLET ORAL at 20:08

## 2024-09-08 RX ADMIN — METHOCARBAMOL 500 MG: 500 TABLET ORAL at 20:08

## 2024-09-08 RX ADMIN — Medication 1 TABLET: at 08:23

## 2024-09-08 RX ADMIN — ACETAMINOPHEN 975 MG: 325 TABLET ORAL at 08:23

## 2024-09-08 RX ADMIN — POLYETHYLENE GLYCOL 3350 17 G: 17 POWDER, FOR SOLUTION ORAL at 20:08

## 2024-09-08 RX ADMIN — POLYETHYLENE GLYCOL 3350 17 G: 17 POWDER, FOR SOLUTION ORAL at 08:24

## 2024-09-08 RX ADMIN — MAGNESIUM OXIDE TAB 400 MG (241.3 MG ELEMENTAL MG) 400 MG: 400 (241.3 MG) TAB at 12:09

## 2024-09-08 RX ADMIN — METHOCARBAMOL 500 MG: 500 TABLET ORAL at 08:23

## 2024-09-08 RX ADMIN — METHOCARBAMOL 500 MG: 500 TABLET ORAL at 12:09

## 2024-09-08 RX ADMIN — AMITRIPTYLINE HYDROCHLORIDE 10 MG: 10 TABLET, FILM COATED ORAL at 21:48

## 2024-09-08 RX ADMIN — GABAPENTIN 100 MG: 100 CAPSULE ORAL at 21:48

## 2024-09-08 RX ADMIN — Medication 25 MCG: at 08:23

## 2024-09-08 RX ADMIN — SENNOSIDES AND DOCUSATE SODIUM 2 TABLET: 8.6; 5 TABLET ORAL at 08:24

## 2024-09-08 RX ADMIN — OXYCODONE HYDROCHLORIDE 10 MG: 10 TABLET ORAL at 13:50

## 2024-09-08 ASSESSMENT — ACTIVITIES OF DAILY LIVING (ADL)
ADLS_ACUITY_SCORE: 31

## 2024-09-08 NOTE — PROGRESS NOTES
[facility], [place]   Neurosurgery Progress Note:    Date of service: 9/8/2024    Assessment: Judah Leon is a 84 year old-year-old male with a notable PMHx of stage IIIb chronic kidney disease, diabetes, hypertension, history of prostate cancer, anemia, presenting with unstable T 8-9 fracture. He is neurologically intact. [facility] to [facility] for surgical management.    Plan:  - Neuro checks/vital signs: Every 4 hours  - Pain control  - Activity: TLSO brace at all times.  Okay to be up to chair only.  No waking   - Restrictions/Bracing: TLSO brace at all times  - Diet: Regular  - DVT prophylaxis: SCDs  - MRI Thoracic Spine w/wo   - PT/OT: ordered  - Medicine pre-operative evaluation - appreciate recommendations  - Scheduled bowel regimen  - Electrolyte replacement protocol  - Daily labs  - Insulin sliding scale  - Platelets > 100,000  - INR < 1.5  - Hemoglobin > 8  - Tentative plan for OR on Monday for T7-11 Percutaneous Screws, possible additional levels with Dr. Guadalupe    Discussed with Dr. Shah, weekend staff.       April Hull MD, PGY-1  Department of Neurosurgery  Pager: [phone]    Please contact neurosurgery resident on call with questions.    Dial * * *[phone], enter [phone] when prompted.       Interval History:  No acute events overnight. Patient unable to tolerate MRI.       Objective:   Temp:  [97.5  F (36.4  C)-98  F (36.7  C)] 97.5  F (36.4  C)  Pulse:  [71-98] 71  Resp:  [16-17] 16  BP: (133-159)/() 151/89  SpO2:  [94 %-99 %] 99 %  I/O last 3 completed shifts:  In: 450 [P.O.:450]  Out: 250 [Urine:250]    Gen: Appears comfortable, NAD  Neurologic:  - Alert & Oriented to person, place, time, and situation  - Follows commands briskly  - Speech fluent, spontaneous. No aphasia or dysarthria.  - No gaze preference. No apparent hemineglect.  - PERRL, EOMI  - Strong eye closure, jaw clench, and cheek puff  - Face symmetric with  sensation intact to light touch  - Palate elevates symmetrically, uvula midline, tongue protrudes midline  - Trapezii and sternocleidomastoid muscles 5/5 bilaterally  - No pronator drift     Del Tr Bi WE WF Gr   R 5 5 5 5 5 5   L 5 5 5 5 5 5    HF KE KF DF PF EHL   R 5 5 5 5 5 5   L 5 5 5 5 5 5     Reflexes 2+ throughout    Sensation intact and symmetric to light touch throughout    LABS  Recent Labs   Lab Test 09/08/24 0442 09/07/24  0717 09/06/24  0518   WBC 6.9 8.0 7.3   HGB 12.5* 13.6 12.3*   MCV 98 97 96   * 158 151       Recent Labs   Lab Test 09/08/24 0939 09/08/24 0816 09/08/24 0442 09/07/24  0803 09/07/24  0717 09/06/24  1206 09/06/24  0518   NA  --   --  137  --  137  --  135   POTASSIUM  --   --  4.4  --  4.5  --  4.6   CHLORIDE  --   --  105  --  105  --  103   CO2  --   --  19*  --  21*  --  22   BUN  --   --  31.6*  --  28.7*  --  25.6*   CR  --   --  1.32*  --  1.50*  --  1.46*   ANIONGAP  --   --  13  --  11  --  10   ROB  --   --  9.1  --  9.4  --  8.9   * 225* 258*   < > 207*   < > 203*    < > = values in this interval not displayed.       IMAGING    No results found for this or any previous visit (from the past 24 hour(s)).

## 2024-09-08 NOTE — PROVIDER NOTIFICATION
PT 0200 BG is 292, Do you want to correct ? Just 0200 check no sliding scale ordered. Thanks   P2 s/p D&C at 15w5d () for incomplete  with hemorrhage. Patient required uterotonics and 3uRBC with emergenct dilation and vacuum curettage procedure. Procedure uncomplicated. Patient seen in office today due to ongoing anxiety, nausea, inability to tolerate PO, and unable to sleep. Concern for symptomatic anemia given recent hemorrhage. In ED - labs normal - no leukocytosis, no anemia. normal DDimer. pending TSH. normal VS after IV hydration. Patient feeling improved after IV hydration. Likely symptoms due to dehydration and PTSD disorder surrounding hemorrhage and recent miscarriage. Patient stable to follow up outpatient. Precautions given to call MD or return to ER.   Bessy BUSCH

## 2024-09-08 NOTE — ANESTHESIA PREPROCEDURE EVALUATION
Anesthesia Pre-Procedure Evaluation    Patient: Judah Leon   MRN: [id number] : 1940        Procedure : Procedure(s):  o-arm/stealth assisted Thoracic 7-11 Minimally Invasive Percutaneous Posterior instrumented fusion with Bone Morphogenic Protein and Allograft          No past medical history on file.   No past surgical history on file.   No Known Allergies   Social History     Tobacco Use    Smoking status: Not on file    Smokeless tobacco: Not on file   Substance Use Topics    Alcohol use: Not on file      Wt Readings from Last 1 Encounters:   24 74.2 kg (163 lb 9.3 oz)        Anesthesia Evaluation   Pt has had prior anesthetic. Type: MAC.    No history of anesthetic complications       ROS/MED HX  ENT/Pulmonary:  - neg pulmonary ROS     Neurologic: Comment: Unstable thoracic spine fx T8-T9 after mechanical fall, no neurologic symptoms currently.     Hx hemorrhagic stroke in       Cardiovascular:     (+) Dyslipidemia hypertension- -   -  - -                                 Previous cardiac testing   Echo: Date:  Results:  LVEF 60%  Stress Test:  Date: Results:    ECG Reviewed:  Date: Results:    Cath:  Date: Results:      METS/Exercise Tolerance: >4 METS    Hematologic:  - neg hematologic  ROS     Musculoskeletal:       GI/Hepatic:       Renal/Genitourinary:     (+) renal disease, type: CRI, Pt does not require dialysis,           Endo:     (+)  type II DM,   Not using insulin,                 Psychiatric/Substance Use:       Infectious Disease:       Malignancy:       Other:            Physical Exam    Airway      Comment: Neck ROM limited by thoracic spine stabilizer.    Mallampati: I   TM distance: > 3 FB   Neck ROM: limited   Mouth opening: > 3 cm    Respiratory Devices and Support         Dental       (+) Modest Abnormalities - crowns, retainers, 1 or 2 missing teeth      Cardiovascular   cardiovascular exam normal          Pulmonary   pulmonary exam normal                OUTSIDE  "LABS:  CBC:   Lab Results   Component Value Date    WBC 6.9 09/08/2024    WBC 8.0 09/07/2024    HGB 12.5 (L) 09/08/2024    HGB 13.6 09/07/2024    HCT 38.0 (L) 09/08/2024    HCT 40.3 09/07/2024     (L) 09/08/2024     09/07/2024     BMP:   Lab Results   Component Value Date     09/08/2024     09/07/2024    POTASSIUM 4.4 09/08/2024    POTASSIUM 4.5 09/07/2024    CHLORIDE 105 09/08/2024    CHLORIDE 105 09/07/2024    CO2 19 (L) 09/08/2024    CO2 21 (L) 09/07/2024    BUN 31.6 (H) 09/08/2024    BUN 28.7 (H) 09/07/2024    CR 1.32 (H) 09/08/2024    CR 1.50 (H) 09/07/2024     (H) 09/08/2024     (H) 09/08/2024     COAGS:   Lab Results   Component Value Date    PTT 29 09/08/2024    INR 1.10 09/08/2024    FIBR 507 09/05/2024     POC: No results found for: \"BGM\", \"HCG\", \"HCGS\"  HEPATIC:   Lab Results   Component Value Date    ALBUMIN 4.5 08/30/2006    PROTTOTAL 7.4 08/30/2006    ALT 17 08/30/2006    AST 37 08/30/2006    ALKPHOS 75 08/30/2006    BILITOTAL 1.1 08/30/2006     OTHER:   Lab Results   Component Value Date    LACT 1.3 09/05/2024    ROB 9.1 09/08/2024    PHOS 3.7 09/08/2024    MAG 1.9 09/08/2024       Anesthesia Plan    ASA Status:  3    NPO Status:  Will be NPO Appropriate at ...    Anesthesia Type: General.     - Airway: ETT   Induction: Intravenous.   Maintenance: TIVA.   Techniques and Equipment:     - Airway: Video-Laryngoscope     - Lines/Monitors: 2nd IV, Arterial Line     Consents          - Extended Intubation/Ventilatory Support Discussed: Yes.      - Patient is DNR/DNI Status: No     Use of blood products discussed: Yes.     - Discussed with: Patient.     - Consented: consented to blood products     Postoperative Care    Pain management: Multi-modal analgesia.   PONV prophylaxis: Ondansetron (or other 5HT-3)     Comments:               Dylan Flores MD    I have reviewed the pertinent notes and labs in the chart from the past 30 days and (re)examined the patient.  " Any updates or changes from those notes are reflected in this note.

## 2024-09-08 NOTE — PLAN OF CARE
Status: Pt admitted for unstable T8-9 fracture. PMHx: stage III CKD, DM, HTN, prostate cancer, anemia.   Vitals: VSS on RA ex intermittent HTN within parameter of SBP <160.   Neuros: A&O x4, but forgetful. Numbness to bilateral feet at baseline. Strengths 5/5, generalized weakness.   IV: PIV SL.   Labs/Electrolytes: BG checks with meals and at bedside, elevated today d/t snacking.   Resp/trach: LS clear. Denies SOB.   Diet: Regular, good intake. Needs help ordering meals.   Bowel status: BS+. LBM today.   : Voids spontaneously via bedside commode.   Skin: Blanchable redness to coccyx. Bruising throughout.   Pain: C/o very mild pain this AM, only taking scheduled Tylenol and Robaxin. Pain increased this afternoon d/t positioning with MRI. PRN IV Dilaudid and PO oxycodone given, partially effective.   Activity: Up with assist of one, GB, walker to bedside commode or chair only per orders. Pt cannot walk further than the bedside. TLSO brace on at all times.   Social: Family at bedside this evening, helpful and supportive of pt.   Plan: Tentative plan for OR on Monday for T7-11 Percutaneous Screws, possible additional levels with Dr. Guadalupe.  Updates this shift: MRI attempted twice, unable to obtain - team aware.   Education completed: Pt educated on importance of calling staff when wanting to get up.       Goal Outcome Evaluation:    Plan of Care Reviewed With: patient  Overall Patient Progress: no change  Outcome Evaluation: Pt still on bedrest ex for pivoting to commode - declined offers of chair. TLSO at all times. Needs MRI with sedation.

## 2024-09-08 NOTE — PLAN OF CARE
Status: Unstable T8-9 fracture. PMHx: stage III CKD, DM, HTN, prostate cancer, anemia.  Vitals: HTN within parameters   Neuros: A&O x4, numbness to b/l feet at baseline. Strengths 5/5 with GW   IV: PIV SL  Labs/Electrolytes: Magnesium replaced, recheck in   AM   Resp/trach: LSC on RA   Diet: Regular, NPO at midnight   Bowel status: LBM 9/7   : Voiding via urinal   Skin: Scattered bruising   Pain: Back pain with activity, PRN oxycodone given   Activity: A1/GB/FWW to chair/commode - no walking per order. Strict spine precautions. TLSO on at all times    Plan: OR on Monday 9/10 for T7-11 Percutaneous Screws, possible additional levels with Dr. Guadalupe

## 2024-09-08 NOTE — PLAN OF CARE
Status: Unstable T8-9 fracture. PMHx: stage III CKD, DM, HTN, prostate cancer, anemia.  Vitals: Hypertensive within MD parameters. Otherwise VSS  Neuros: A&Ox4 numbness to bilateral feet at baseline strength 5/5 GW   IV: PIV SL  Resp/trach: Continuous pulse ox OVN   Diet: Regular   Bowel status: 9/7   : Voiding via urinal   Skin: blanchable redness to coccyx. Bruising thru out. Wears TLSO at all times as ordered.  Pain: 4/10 back pain when resting  Activity: A1/GB/FWW to chair & commode - no walking per order. Strict spine precautions   Plan: Tentative plan for OR on Monday for T7-11 Percutaneous Screws, possible additional levels with Dr. Guadalupe

## 2024-09-09 ENCOUNTER — ANESTHESIA (OUTPATIENT)
Dept: SURGERY | Facility: CLINIC | Age: 84
DRG: 459 | End: 2024-09-09
Payer: COMMERCIAL

## 2024-09-09 ENCOUNTER — APPOINTMENT (OUTPATIENT)
Dept: GENERAL RADIOLOGY | Facility: CLINIC | Age: 84
DRG: 459 | End: 2024-09-09
Attending: NEUROLOGICAL SURGERY
Payer: COMMERCIAL

## 2024-09-09 PROBLEM — Z98.1 S/P FUSION OF THORACIC SPINE: Status: ACTIVE | Noted: 2024-09-09

## 2024-09-09 LAB
ANION GAP SERPL CALCULATED.3IONS-SCNC: 11 MMOL/L (ref 7–15)
BUN SERPL-MCNC: 32.3 MG/DL (ref 8–23)
CALCIUM SERPL-MCNC: 9.2 MG/DL (ref 8.8–10.4)
CHLORIDE SERPL-SCNC: 104 MMOL/L (ref 98–107)
CREAT SERPL-MCNC: 1.47 MG/DL (ref 0.67–1.17)
EGFRCR SERPLBLD CKD-EPI 2021: 46 ML/MIN/1.73M2
ERYTHROCYTE [DISTWIDTH] IN BLOOD BY AUTOMATED COUNT: 12.6 % (ref 10–15)
GLUCOSE BLDC GLUCOMTR-MCNC: 121 MG/DL (ref 70–99)
GLUCOSE BLDC GLUCOMTR-MCNC: 129 MG/DL (ref 70–99)
GLUCOSE BLDC GLUCOMTR-MCNC: 138 MG/DL (ref 70–99)
GLUCOSE BLDC GLUCOMTR-MCNC: 145 MG/DL (ref 70–99)
GLUCOSE BLDC GLUCOMTR-MCNC: 150 MG/DL (ref 70–99)
GLUCOSE BLDC GLUCOMTR-MCNC: 154 MG/DL (ref 70–99)
GLUCOSE BLDC GLUCOMTR-MCNC: 154 MG/DL (ref 70–99)
GLUCOSE BLDC GLUCOMTR-MCNC: 160 MG/DL (ref 70–99)
GLUCOSE BLDC GLUCOMTR-MCNC: 177 MG/DL (ref 70–99)
GLUCOSE BLDC GLUCOMTR-MCNC: 188 MG/DL (ref 70–99)
GLUCOSE BLDC GLUCOMTR-MCNC: 209 MG/DL (ref 70–99)
GLUCOSE BLDC GLUCOMTR-MCNC: 216 MG/DL (ref 70–99)
GLUCOSE BLDC GLUCOMTR-MCNC: 217 MG/DL (ref 70–99)
GLUCOSE BLDC GLUCOMTR-MCNC: 222 MG/DL (ref 70–99)
GLUCOSE BLDC GLUCOMTR-MCNC: 233 MG/DL (ref 70–99)
GLUCOSE BLDC GLUCOMTR-MCNC: 269 MG/DL (ref 70–99)
GLUCOSE BLDC GLUCOMTR-MCNC: 277 MG/DL (ref 70–99)
GLUCOSE SERPL-MCNC: 259 MG/DL (ref 70–99)
HBA1C MFR BLD: 8.2 %
HCO3 SERPL-SCNC: 21 MMOL/L (ref 22–29)
HCT VFR BLD AUTO: 38.1 % (ref 40–53)
HGB BLD-MCNC: 12.8 G/DL (ref 13.3–17.7)
MAGNESIUM SERPL-MCNC: 2.1 MG/DL (ref 1.7–2.3)
MCH RBC QN AUTO: 33 PG (ref 26.5–33)
MCHC RBC AUTO-ENTMCNC: 33.6 G/DL (ref 31.5–36.5)
MCV RBC AUTO: 98 FL (ref 78–100)
PHOSPHATE SERPL-MCNC: 3.9 MG/DL (ref 2.5–4.5)
PLATELET # BLD AUTO: 153 10E3/UL (ref 150–450)
POTASSIUM SERPL-SCNC: 4.7 MMOL/L (ref 3.4–5.3)
RBC # BLD AUTO: 3.88 10E6/UL (ref 4.4–5.9)
SODIUM SERPL-SCNC: 136 MMOL/L (ref 135–145)
WBC # BLD AUTO: 6.8 10E3/UL (ref 4–11)

## 2024-09-09 PROCEDURE — 22327 TREAT THORAX SPINE FRACTURE: CPT | Mod: GC | Performed by: NEUROLOGICAL SURGERY

## 2024-09-09 PROCEDURE — 84100 ASSAY OF PHOSPHORUS: CPT

## 2024-09-09 PROCEDURE — 250N000011 HC RX IP 250 OP 636: Performed by: NURSE PRACTITIONER

## 2024-09-09 PROCEDURE — 250N000025 HC SEVOFLURANE, PER MIN: Performed by: NEUROLOGICAL SURGERY

## 2024-09-09 PROCEDURE — C1889 IMPLANT/INSERT DEVICE, NOC: HCPCS | Performed by: NEUROLOGICAL SURGERY

## 2024-09-09 PROCEDURE — 83036 HEMOGLOBIN GLYCOSYLATED A1C: CPT | Performed by: STUDENT IN AN ORGANIZED HEALTH CARE EDUCATION/TRAINING PROGRAM

## 2024-09-09 PROCEDURE — 250N000013 HC RX MED GY IP 250 OP 250 PS 637: Performed by: NURSE PRACTITIONER

## 2024-09-09 PROCEDURE — 250N000011 HC RX IP 250 OP 636: Performed by: STUDENT IN AN ORGANIZED HEALTH CARE EDUCATION/TRAINING PROGRAM

## 2024-09-09 PROCEDURE — 250N000009 HC RX 250

## 2024-09-09 PROCEDURE — 85027 COMPLETE CBC AUTOMATED: CPT

## 2024-09-09 PROCEDURE — 83735 ASSAY OF MAGNESIUM: CPT

## 2024-09-09 PROCEDURE — 4A11X4G MONITORING OF PERIPHERAL NERVOUS ELECTRICAL ACTIVITY, INTRAOPERATIVE, EXTERNAL APPROACH: ICD-10-PCS | Performed by: NEUROLOGICAL SURGERY

## 2024-09-09 PROCEDURE — 258N000003 HC RX IP 258 OP 636: Performed by: STUDENT IN AN ORGANIZED HEALTH CARE EDUCATION/TRAINING PROGRAM

## 2024-09-09 PROCEDURE — 22614 ARTHRD PST TQ 1NTRSPC EA ADD: CPT | Mod: GC | Performed by: NEUROLOGICAL SURGERY

## 2024-09-09 PROCEDURE — 20930 SP BONE ALGRFT MORSEL ADD-ON: CPT | Mod: GC | Performed by: NEUROLOGICAL SURGERY

## 2024-09-09 PROCEDURE — 999N000128 HC STATISTIC PERIPHERAL IV START W/O US GUIDANCE

## 2024-09-09 PROCEDURE — 710N000010 HC RECOVERY PHASE 1, LEVEL 2, PER MIN: Performed by: NEUROLOGICAL SURGERY

## 2024-09-09 PROCEDURE — 258N000003 HC RX IP 258 OP 636: Performed by: NEUROLOGICAL SURGERY

## 2024-09-09 PROCEDURE — 0RG70K1 FUSION OF 2 TO 7 THORACIC VERTEBRAL JOINTS WITH NONAUTOLOGOUS TISSUE SUBSTITUTE, POSTERIOR APPROACH, POSTERIOR COLUMN, OPEN APPROACH: ICD-10-PCS | Performed by: NEUROLOGICAL SURGERY

## 2024-09-09 PROCEDURE — 250N000009 HC RX 250: Performed by: STUDENT IN AN ORGANIZED HEALTH CARE EDUCATION/TRAINING PROGRAM

## 2024-09-09 PROCEDURE — 120N000002 HC R&B MED SURG/OB UMMC

## 2024-09-09 PROCEDURE — 370N000017 HC ANESTHESIA TECHNICAL FEE, PER MIN: Performed by: NEUROLOGICAL SURGERY

## 2024-09-09 PROCEDURE — L8699 PROSTHETIC IMPLANT NOS: HCPCS | Performed by: NEUROLOGICAL SURGERY

## 2024-09-09 PROCEDURE — 22610 ARTHRD PST TQ 1NTRSPC THRC: CPT | Mod: 51 | Performed by: NEUROLOGICAL SURGERY

## 2024-09-09 PROCEDURE — C1762 CONN TISS, HUMAN(INC FASCIA): HCPCS | Performed by: NEUROLOGICAL SURGERY

## 2024-09-09 PROCEDURE — 258N000003 HC RX IP 258 OP 636: Performed by: NURSE PRACTITIONER

## 2024-09-09 PROCEDURE — 250N000009 HC RX 250: Performed by: NEUROLOGICAL SURGERY

## 2024-09-09 PROCEDURE — 250N000011 HC RX IP 250 OP 636

## 2024-09-09 PROCEDURE — 999N000141 HC STATISTIC PRE-PROCEDURE NURSING ASSESSMENT: Performed by: NEUROLOGICAL SURGERY

## 2024-09-09 PROCEDURE — 360N000086 HC SURGERY LEVEL 6 W/ FLUORO, PER MIN: Performed by: NEUROLOGICAL SURGERY

## 2024-09-09 PROCEDURE — 80048 BASIC METABOLIC PNL TOTAL CA: CPT

## 2024-09-09 PROCEDURE — C1713 ANCHOR/SCREW BN/BN,TIS/BN: HCPCS | Performed by: NEUROLOGICAL SURGERY

## 2024-09-09 PROCEDURE — 272N000001 HC OR GENERAL SUPPLY STERILE: Performed by: NEUROLOGICAL SURGERY

## 2024-09-09 PROCEDURE — 22327 TREAT THORAX SPINE FRACTURE: CPT | Performed by: REGISTERED NURSE

## 2024-09-09 PROCEDURE — 36415 COLL VENOUS BLD VENIPUNCTURE: CPT

## 2024-09-09 PROCEDURE — 8E0WXBF COMPUTER ASSISTED PROCEDURE OF TRUNK REGION, WITH FLUOROSCOPY: ICD-10-PCS | Performed by: NEUROLOGICAL SURGERY

## 2024-09-09 PROCEDURE — 99100 ANES PT EXTEME AGE<1 YR&>70: CPT | Performed by: REGISTERED NURSE

## 2024-09-09 PROCEDURE — 999N000179 XR SURGERY CARM FLUORO LESS THAN 5 MIN W STILLS: Mod: TC

## 2024-09-09 PROCEDURE — 61783 SCAN PROC SPINAL: CPT | Mod: GC | Performed by: NEUROLOGICAL SURGERY

## 2024-09-09 PROCEDURE — 22842 INSERT SPINE FIXATION DEVICE: CPT | Mod: GC | Performed by: NEUROLOGICAL SURGERY

## 2024-09-09 PROCEDURE — 36415 COLL VENOUS BLD VENIPUNCTURE: CPT | Performed by: STUDENT IN AN ORGANIZED HEALTH CARE EDUCATION/TRAINING PROGRAM

## 2024-09-09 DEVICE — GRAFT BONE INFUSE BMP XSM 7510100: Type: IMPLANTABLE DEVICE | Site: SPINE THORACIC | Status: FUNCTIONAL

## 2024-09-09 DEVICE — GRAFT BONE CHIPS CANC CUBE 15CC 100315: Type: IMPLANTABLE DEVICE | Site: SPINE THORACIC | Status: FUNCTIONAL

## 2024-09-09 DEVICE — IMPLANTABLE DEVICE: Type: IMPLANTABLE DEVICE | Status: FUNCTIONAL

## 2024-09-09 DEVICE — IMPLANTABLE DEVICE: Type: IMPLANTABLE DEVICE | Site: SPINE THORACIC | Status: FUNCTIONAL

## 2024-09-09 DEVICE — IMP SCR SET BONE EVEREST PA TI 2901-10001: Type: IMPLANTABLE DEVICE | Site: SPINE THORACIC | Status: FUNCTIONAL

## 2024-09-09 RX ORDER — ACETAMINOPHEN 325 MG/1
975 TABLET ORAL EVERY 8 HOURS
Status: COMPLETED | OUTPATIENT
Start: 2024-09-09 | End: 2024-09-12

## 2024-09-09 RX ORDER — FENTANYL CITRATE 50 UG/ML
25 INJECTION, SOLUTION INTRAMUSCULAR; INTRAVENOUS EVERY 5 MIN PRN
Status: DISCONTINUED | OUTPATIENT
Start: 2024-09-09 | End: 2024-09-09 | Stop reason: HOSPADM

## 2024-09-09 RX ORDER — ONDANSETRON 2 MG/ML
4 INJECTION INTRAMUSCULAR; INTRAVENOUS EVERY 30 MIN PRN
Status: DISCONTINUED | OUTPATIENT
Start: 2024-09-09 | End: 2024-09-09 | Stop reason: HOSPADM

## 2024-09-09 RX ORDER — HYDRALAZINE HYDROCHLORIDE 20 MG/ML
2.5-5 INJECTION INTRAMUSCULAR; INTRAVENOUS EVERY 10 MIN PRN
Status: DISCONTINUED | OUTPATIENT
Start: 2024-09-09 | End: 2024-09-09 | Stop reason: HOSPADM

## 2024-09-09 RX ORDER — DEXTROSE MONOHYDRATE 100 MG/ML
INJECTION, SOLUTION INTRAVENOUS CONTINUOUS PRN
Status: DISCONTINUED | OUTPATIENT
Start: 2024-09-09 | End: 2024-09-19

## 2024-09-09 RX ORDER — DEXAMETHASONE SODIUM PHOSPHATE 4 MG/ML
4 INJECTION, SOLUTION INTRA-ARTICULAR; INTRALESIONAL; INTRAMUSCULAR; INTRAVENOUS; SOFT TISSUE
Status: DISCONTINUED | OUTPATIENT
Start: 2024-09-09 | End: 2024-09-09 | Stop reason: HOSPADM

## 2024-09-09 RX ORDER — CEFAZOLIN SODIUM 1 G/3ML
1 INJECTION, POWDER, FOR SOLUTION INTRAMUSCULAR; INTRAVENOUS EVERY 8 HOURS
Status: COMPLETED | OUTPATIENT
Start: 2024-09-09 | End: 2024-09-10

## 2024-09-09 RX ORDER — CEFAZOLIN SODIUM/WATER 2 G/20 ML
2 SYRINGE (ML) INTRAVENOUS
Status: COMPLETED | OUTPATIENT
Start: 2024-09-09 | End: 2024-09-09

## 2024-09-09 RX ORDER — SODIUM CHLORIDE, SODIUM LACTATE, POTASSIUM CHLORIDE, CALCIUM CHLORIDE 600; 310; 30; 20 MG/100ML; MG/100ML; MG/100ML; MG/100ML
INJECTION, SOLUTION INTRAVENOUS CONTINUOUS
Status: DISCONTINUED | OUTPATIENT
Start: 2024-09-09 | End: 2024-09-09 | Stop reason: HOSPADM

## 2024-09-09 RX ORDER — HYDROMORPHONE HCL IN WATER/PF 6 MG/30 ML
0.2 PATIENT CONTROLLED ANALGESIA SYRINGE INTRAVENOUS EVERY 5 MIN PRN
Status: DISCONTINUED | OUTPATIENT
Start: 2024-09-09 | End: 2024-09-09 | Stop reason: HOSPADM

## 2024-09-09 RX ORDER — AMOXICILLIN 250 MG
1 CAPSULE ORAL 2 TIMES DAILY
Status: DISCONTINUED | OUTPATIENT
Start: 2024-09-09 | End: 2024-09-12

## 2024-09-09 RX ORDER — LIDOCAINE HYDROCHLORIDE 20 MG/ML
INJECTION, SOLUTION INFILTRATION; PERINEURAL PRN
Status: DISCONTINUED | OUTPATIENT
Start: 2024-09-09 | End: 2024-09-09

## 2024-09-09 RX ORDER — ACETAMINOPHEN 325 MG/1
650 TABLET ORAL EVERY 4 HOURS PRN
Status: DISCONTINUED | OUTPATIENT
Start: 2024-09-12 | End: 2024-09-24 | Stop reason: HOSPADM

## 2024-09-09 RX ORDER — LIDOCAINE HYDROCHLORIDE AND EPINEPHRINE 10; 10 MG/ML; UG/ML
INJECTION, SOLUTION INFILTRATION; PERINEURAL PRN
Status: DISCONTINUED | OUTPATIENT
Start: 2024-09-09 | End: 2024-09-09 | Stop reason: HOSPADM

## 2024-09-09 RX ORDER — CEFAZOLIN SODIUM/WATER 2 G/20 ML
2 SYRINGE (ML) INTRAVENOUS SEE ADMIN INSTRUCTIONS
Status: DISCONTINUED | OUTPATIENT
Start: 2024-09-09 | End: 2024-09-09 | Stop reason: HOSPADM

## 2024-09-09 RX ORDER — DEXAMETHASONE SODIUM PHOSPHATE 4 MG/ML
INJECTION, SOLUTION INTRA-ARTICULAR; INTRALESIONAL; INTRAMUSCULAR; INTRAVENOUS; SOFT TISSUE PRN
Status: DISCONTINUED | OUTPATIENT
Start: 2024-09-09 | End: 2024-09-09

## 2024-09-09 RX ORDER — SODIUM CHLORIDE, SODIUM LACTATE, POTASSIUM CHLORIDE, CALCIUM CHLORIDE 600; 310; 30; 20 MG/100ML; MG/100ML; MG/100ML; MG/100ML
INJECTION, SOLUTION INTRAVENOUS CONTINUOUS PRN
Status: DISCONTINUED | OUTPATIENT
Start: 2024-09-09 | End: 2024-09-09

## 2024-09-09 RX ORDER — DEXTROSE MONOHYDRATE 25 G/50ML
25-50 INJECTION, SOLUTION INTRAVENOUS
Status: DISCONTINUED | OUTPATIENT
Start: 2024-09-09 | End: 2024-09-09 | Stop reason: HOSPADM

## 2024-09-09 RX ORDER — ONDANSETRON 4 MG/1
4 TABLET, ORALLY DISINTEGRATING ORAL EVERY 30 MIN PRN
Status: DISCONTINUED | OUTPATIENT
Start: 2024-09-09 | End: 2024-09-09 | Stop reason: HOSPADM

## 2024-09-09 RX ORDER — SODIUM CHLORIDE, SODIUM GLUCONATE, SODIUM ACETATE, POTASSIUM CHLORIDE AND MAGNESIUM CHLORIDE 526; 502; 368; 37; 30 MG/100ML; MG/100ML; MG/100ML; MG/100ML; MG/100ML
INJECTION, SOLUTION INTRAVENOUS CONTINUOUS PRN
Status: DISCONTINUED | OUTPATIENT
Start: 2024-09-09 | End: 2024-09-09

## 2024-09-09 RX ORDER — NALOXONE HYDROCHLORIDE 0.4 MG/ML
0.1 INJECTION, SOLUTION INTRAMUSCULAR; INTRAVENOUS; SUBCUTANEOUS
Status: DISCONTINUED | OUTPATIENT
Start: 2024-09-09 | End: 2024-09-09 | Stop reason: HOSPADM

## 2024-09-09 RX ORDER — NICOTINE POLACRILEX 4 MG
15-30 LOZENGE BUCCAL
Status: DISCONTINUED | OUTPATIENT
Start: 2024-09-09 | End: 2024-09-09 | Stop reason: HOSPADM

## 2024-09-09 RX ORDER — ESMOLOL HYDROCHLORIDE 10 MG/ML
INJECTION INTRAVENOUS PRN
Status: DISCONTINUED | OUTPATIENT
Start: 2024-09-09 | End: 2024-09-09

## 2024-09-09 RX ORDER — SODIUM CHLORIDE 9 MG/ML
INJECTION, SOLUTION INTRAVENOUS CONTINUOUS
Status: DISCONTINUED | OUTPATIENT
Start: 2024-09-09 | End: 2024-09-13

## 2024-09-09 RX ORDER — OXYCODONE HYDROCHLORIDE 5 MG/1
5 TABLET ORAL EVERY 4 HOURS PRN
Status: DISCONTINUED | OUTPATIENT
Start: 2024-09-09 | End: 2024-09-10

## 2024-09-09 RX ORDER — PROPOFOL 10 MG/ML
INJECTION, EMULSION INTRAVENOUS CONTINUOUS PRN
Status: DISCONTINUED | OUTPATIENT
Start: 2024-09-09 | End: 2024-09-09

## 2024-09-09 RX ORDER — FENTANYL CITRATE 50 UG/ML
INJECTION, SOLUTION INTRAMUSCULAR; INTRAVENOUS PRN
Status: DISCONTINUED | OUTPATIENT
Start: 2024-09-09 | End: 2024-09-09

## 2024-09-09 RX ORDER — PROPOFOL 10 MG/ML
INJECTION, EMULSION INTRAVENOUS PRN
Status: DISCONTINUED | OUTPATIENT
Start: 2024-09-09 | End: 2024-09-09

## 2024-09-09 RX ORDER — PROCHLORPERAZINE MALEATE 5 MG
5 TABLET ORAL EVERY 6 HOURS PRN
Status: DISCONTINUED | OUTPATIENT
Start: 2024-09-09 | End: 2024-09-24 | Stop reason: HOSPADM

## 2024-09-09 RX ORDER — LABETALOL HYDROCHLORIDE 5 MG/ML
10 INJECTION, SOLUTION INTRAVENOUS
Status: COMPLETED | OUTPATIENT
Start: 2024-09-09 | End: 2024-09-09

## 2024-09-09 RX ORDER — HYDROMORPHONE HCL IN WATER/PF 6 MG/30 ML
0.4 PATIENT CONTROLLED ANALGESIA SYRINGE INTRAVENOUS EVERY 5 MIN PRN
Status: DISCONTINUED | OUTPATIENT
Start: 2024-09-09 | End: 2024-09-09 | Stop reason: HOSPADM

## 2024-09-09 RX ORDER — LIDOCAINE 40 MG/G
CREAM TOPICAL
Status: DISCONTINUED | OUTPATIENT
Start: 2024-09-09 | End: 2024-09-24 | Stop reason: HOSPADM

## 2024-09-09 RX ORDER — FENTANYL CITRATE 50 UG/ML
50 INJECTION, SOLUTION INTRAMUSCULAR; INTRAVENOUS EVERY 5 MIN PRN
Status: DISCONTINUED | OUTPATIENT
Start: 2024-09-09 | End: 2024-09-09 | Stop reason: HOSPADM

## 2024-09-09 RX ORDER — ONDANSETRON 4 MG/1
4 TABLET, ORALLY DISINTEGRATING ORAL EVERY 6 HOURS PRN
Status: DISCONTINUED | OUTPATIENT
Start: 2024-09-09 | End: 2024-09-24 | Stop reason: HOSPADM

## 2024-09-09 RX ORDER — SCOLOPAMINE TRANSDERMAL SYSTEM 1 MG/1
1 PATCH, EXTENDED RELEASE TRANSDERMAL
Status: DISCONTINUED | OUTPATIENT
Start: 2024-09-09 | End: 2024-09-10

## 2024-09-09 RX ORDER — ONDANSETRON 2 MG/ML
4 INJECTION INTRAMUSCULAR; INTRAVENOUS EVERY 6 HOURS PRN
Status: DISCONTINUED | OUTPATIENT
Start: 2024-09-10 | End: 2024-09-13

## 2024-09-09 RX ORDER — FAMOTIDINE 20 MG/1
20 TABLET, FILM COATED ORAL 2 TIMES DAILY
Status: DISCONTINUED | OUTPATIENT
Start: 2024-09-09 | End: 2024-09-24 | Stop reason: HOSPADM

## 2024-09-09 RX ADMIN — PROPOFOL 110 MG: 10 INJECTION, EMULSION INTRAVENOUS at 07:41

## 2024-09-09 RX ADMIN — POLYETHYLENE GLYCOL 3350 17 G: 17 POWDER, FOR SOLUTION ORAL at 20:36

## 2024-09-09 RX ADMIN — PHENYLEPHRINE HYDROCHLORIDE 200 MCG: 10 INJECTION INTRAVENOUS at 07:47

## 2024-09-09 RX ADMIN — SODIUM CHLORIDE, POTASSIUM CHLORIDE, SODIUM LACTATE AND CALCIUM CHLORIDE: 600; 310; 30; 20 INJECTION, SOLUTION INTRAVENOUS at 07:33

## 2024-09-09 RX ADMIN — GABAPENTIN 100 MG: 100 CAPSULE ORAL at 21:10

## 2024-09-09 RX ADMIN — HYDROMORPHONE HYDROCHLORIDE 0.5 MG: 1 INJECTION, SOLUTION INTRAMUSCULAR; INTRAVENOUS; SUBCUTANEOUS at 09:42

## 2024-09-09 RX ADMIN — FENTANYL CITRATE 25 MCG: 50 INJECTION, SOLUTION INTRAMUSCULAR; INTRAVENOUS at 14:15

## 2024-09-09 RX ADMIN — PHENYLEPHRINE HYDROCHLORIDE 200 MCG: 10 INJECTION INTRAVENOUS at 08:04

## 2024-09-09 RX ADMIN — FENTANYL CITRATE 50 MCG: 50 INJECTION INTRAMUSCULAR; INTRAVENOUS at 07:40

## 2024-09-09 RX ADMIN — FENTANYL CITRATE 25 MCG: 50 INJECTION, SOLUTION INTRAMUSCULAR; INTRAVENOUS at 14:45

## 2024-09-09 RX ADMIN — SUCCINYLCHOLINE CHLORIDE 100 MG: 20 INJECTION, SOLUTION INTRAMUSCULAR; INTRAVENOUS; PARENTERAL at 07:42

## 2024-09-09 RX ADMIN — FAMOTIDINE 20 MG: 10 INJECTION, SOLUTION INTRAVENOUS at 19:29

## 2024-09-09 RX ADMIN — SCOPOLAMINE 1 PATCH: 1.5 PATCH, EXTENDED RELEASE TRANSDERMAL at 21:24

## 2024-09-09 RX ADMIN — INSULIN HUMAN 2.5 UNITS/HR: 1 INJECTION, SOLUTION INTRAVENOUS at 06:43

## 2024-09-09 RX ADMIN — METHOCARBAMOL 500 MG: 500 TABLET ORAL at 20:36

## 2024-09-09 RX ADMIN — FENTANYL CITRATE 25 MCG: 50 INJECTION, SOLUTION INTRAMUSCULAR; INTRAVENOUS at 14:30

## 2024-09-09 RX ADMIN — ONDANSETRON 4 MG: 2 INJECTION INTRAMUSCULAR; INTRAVENOUS at 10:31

## 2024-09-09 RX ADMIN — PHENYLEPHRINE HYDROCHLORIDE 0.5 MCG/KG/MIN: 10 INJECTION INTRAVENOUS at 07:49

## 2024-09-09 RX ADMIN — ACETAMINOPHEN 975 MG: 325 TABLET ORAL at 17:07

## 2024-09-09 RX ADMIN — OXYCODONE HYDROCHLORIDE 10 MG: 10 TABLET ORAL at 18:14

## 2024-09-09 RX ADMIN — SIMVASTATIN 40 MG: 40 TABLET, FILM COATED ORAL at 21:10

## 2024-09-09 RX ADMIN — HYDROMORPHONE HYDROCHLORIDE 0.5 MG: 1 INJECTION, SOLUTION INTRAMUSCULAR; INTRAVENOUS; SUBCUTANEOUS at 12:23

## 2024-09-09 RX ADMIN — ESMOLOL HYDROCHLORIDE 30 MG: 10 INJECTION, SOLUTION INTRAVENOUS at 09:44

## 2024-09-09 RX ADMIN — PHENYLEPHRINE HYDROCHLORIDE 200 MCG: 10 INJECTION INTRAVENOUS at 07:52

## 2024-09-09 RX ADMIN — DEXAMETHASONE SODIUM PHOSPHATE 4 MG: 4 INJECTION, SOLUTION INTRA-ARTICULAR; INTRALESIONAL; INTRAMUSCULAR; INTRAVENOUS; SOFT TISSUE at 08:38

## 2024-09-09 RX ADMIN — SODIUM CHLORIDE, SODIUM GLUCONATE, SODIUM ACETATE, POTASSIUM CHLORIDE AND MAGNESIUM CHLORIDE: 526; 502; 368; 37; 30 INJECTION, SOLUTION INTRAVENOUS at 07:40

## 2024-09-09 RX ADMIN — AMITRIPTYLINE HYDROCHLORIDE 10 MG: 10 TABLET, FILM COATED ORAL at 21:10

## 2024-09-09 RX ADMIN — Medication 2 G: at 12:03

## 2024-09-09 RX ADMIN — FENTANYL CITRATE 25 MCG: 50 INJECTION, SOLUTION INTRAMUSCULAR; INTRAVENOUS at 14:00

## 2024-09-09 RX ADMIN — PROPOFOL 100 MCG/KG/MIN: 10 INJECTION, EMULSION INTRAVENOUS at 07:47

## 2024-09-09 RX ADMIN — LIDOCAINE HYDROCHLORIDE 100 MG: 20 INJECTION, SOLUTION INFILTRATION; PERINEURAL at 07:40

## 2024-09-09 RX ADMIN — PHENYLEPHRINE HYDROCHLORIDE 200 MCG: 10 INJECTION INTRAVENOUS at 08:01

## 2024-09-09 RX ADMIN — SENNOSIDES AND DOCUSATE SODIUM 1 TABLET: 8.6; 5 TABLET ORAL at 20:36

## 2024-09-09 RX ADMIN — SODIUM CHLORIDE: 9 INJECTION, SOLUTION INTRAVENOUS at 17:03

## 2024-09-09 RX ADMIN — REMIFENTANIL HYDROCHLORIDE 0.05 MCG/KG/MIN: 1 INJECTION, POWDER, LYOPHILIZED, FOR SOLUTION INTRAVENOUS at 07:48

## 2024-09-09 RX ADMIN — LABETALOL HYDROCHLORIDE 10 MG: 5 INJECTION, SOLUTION INTRAVENOUS at 15:30

## 2024-09-09 RX ADMIN — OXYCODONE HYDROCHLORIDE 10 MG: 10 TABLET ORAL at 22:20

## 2024-09-09 RX ADMIN — ONDANSETRON 4 MG: 2 INJECTION INTRAMUSCULAR; INTRAVENOUS at 19:29

## 2024-09-09 RX ADMIN — CEFAZOLIN 1 G: 1 INJECTION, POWDER, FOR SOLUTION INTRAMUSCULAR; INTRAVENOUS at 20:33

## 2024-09-09 RX ADMIN — HYDROMORPHONE HYDROCHLORIDE 0.4 MG: 0.2 INJECTION, SOLUTION INTRAMUSCULAR; INTRAVENOUS; SUBCUTANEOUS at 23:10

## 2024-09-09 RX ADMIN — Medication 2 G: at 08:03

## 2024-09-09 ASSESSMENT — ACTIVITIES OF DAILY LIVING (ADL)
ADLS_ACUITY_SCORE: 31

## 2024-09-09 NOTE — PLAN OF CARE
Goal Outcome Evaluation:      Plan of Care Reviewed With: patient    Overall Patient Progress: no changeOverall Patient Progress: no change    Outcome Evaluation: bedrest ex pivot to chair, declined today. TLSO all times. NPO at MN for OR at 0730 9/9       Status: Unstable T8-9 fracture. PMHx: stage III CKD, DM, HTN, prostate cancer, anemia.  Vitals: HTN within parameters   Neuros: A&O x4, numbness to bilat feet at baseline. Strengths 5/5, generalized weakness, and often limited by pain.    IV: PIV SL  Labs/Electrolytes: Magnesium replaced, recheck in AM   Resp/trach: WNL on RA   Diet: Regular, NPO at midnight   Bowel status: LBM 9/7   : Voiding via urinal   Skin: Scattered bruising   Pain: Back pain with activity, PRN oxycodone given   Activity: A1/GB/walker/pivot to chair/commode - no walking per order. Strict spine precautions. TLSO on at all times    Plan: OR on Monday 9/9 for T7-11 Percutaneous Screws, possible additional levels with Dr. Guadalupe

## 2024-09-09 NOTE — ANESTHESIA CARE TRANSFER NOTE
Patient: Judah Leon    Procedure: Procedure(s):  o-arm/stealth assisted Thoracic 7-11 Minimally Invasive Percutaneous Posterior instrumented fusion with Bone Morphogenic Protein and Allograft       Diagnosis: Closed fracture of ninth thoracic vertebra, unspecified fracture morphology, initial encounter (H) [S24.794H]  Diagnosis Additional Information: No value filed.    Anesthesia Type:   General     Note:    Oropharynx: oropharynx clear of all foreign objects and spontaneously breathing  Level of Consciousness: awake  Oxygen Supplementation: face mask  Level of Supplemental Oxygen (L/min / FiO2): 5  Independent Airway: airway patency satisfactory and stable  Dentition: dentition unchanged  Vital Signs Stable: post-procedure vital signs reviewed and stable  Report to RN Given: handoff report given  Patient transferred to: PACU    Handoff Report: Identifed the Patient, Identified the Reponsible Provider, Reviewed the pertinent medical history, Discussed the surgical course, Reviewed Intra-OP anesthesia mangement and issues during anesthesia, Set expectations for post-procedure period and Allowed opportunity for questions and acknowledgement of understanding    Vitals:  Vitals Value Taken Time   /97 09/09/24 1249   Temp 35.9    Pulse 94 09/09/24 1254   Resp 18 09/09/24 1254   SpO2 99 % 09/09/24 1254   Vitals shown include unfiled device data.    Electronically Signed By: ASH Yanez CRNA  September 9, 2024  12:55 PM

## 2024-09-09 NOTE — ANESTHESIA PROCEDURE NOTES
Airway         Procedure Start/Stop Times: 9/9/2024 7:42 AM  Staff -        Other Anesthesia Staff: Holly Santizo       Performed By: SRNA  Consent for Airway        Urgency: elective  Indications and Patient Condition       Indications for airway management: poonam-procedural       Induction type:intravenous       Mask difficulty assessment: 1 - vent by mask    Final Airway Details       Final airway type: endotracheal airway       Successful airway: ETT - single  Endotracheal Airway Details        ETT size (mm): 7.5       Successful intubation technique: video laryngoscopy       VL Blade Size: Glidescope 3       Grade View of Cords: 1       Adjucts: stylet       Position: Right       Measured from: lips       Secured at (cm): 22       Bite block used: Molar    Post intubation assessment        Placement verified by: capnometry, equal breath sounds and chest rise        Number of attempts at approach: 1       Number of other approaches attempted: 0       Secured with: tape       Ease of procedure: easy       Dentition: Intact and Unchanged    Medication(s) Administered   Medication Administration Time: 9/9/2024 7:42 AM    Additional Comments       #2 soft molar bite blocks, right and left inserted after intubation. VL used for symptomatic unstable spine fracture

## 2024-09-09 NOTE — ANESTHESIA POSTPROCEDURE EVALUATION
Patient: Judah Leon    Procedure: Procedure(s):  o-arm/stealth assisted Thoracic 7-11 Minimally Invasive Percutaneous Posterior instrumented fusion with Bone Morphogenic Protein and Allograft       Anesthesia Type:  General    Note:  Disposition: Inpatient   Postop Pain Control: Uneventful            Sign Out: Well controlled pain   PONV: No   Neuro/Psych:             Sign Out: Acceptable/Baseline neuro status (Pt intermittently confused and not oriented to time, but this is his baseline and how he's been on the floor.)   Airway/Respiratory: Uneventful            Sign Out: Acceptable/Baseline resp. status   CV/Hemodynamics: Uneventful            Sign Out: Acceptable CV status; No obvious hypovolemia; No obvious fluid overload   Other NRE: NONE   DID A NON-ROUTINE EVENT OCCUR?            Last vitals:  Vitals Value Taken Time   /90 09/09/24 1545   Temp 36.8  C (98.2  F) 09/09/24 1530   Pulse 73 09/09/24 1554   Resp 14 09/09/24 1554   SpO2 98 % 09/09/24 1554   Vitals shown include unfiled device data.    Electronically Signed By: Lacey Hsu MD  September 9, 2024  3:54 PM

## 2024-09-09 NOTE — PLAN OF CARE
Status: Unstable T8-9 fracture. PMHx: stage III CKD, DM, HTN, prostate cancer, anemia.  Vitals: Hypertensive within MD parameters. Otherwise VSS  Neuros: A&Ox4 numbness to bilateral feet at baseline strength 5/5 GW   IV: PIV SL  Resp/trach: Continuous pulse ox OVN   Diet: NPO  Bowel status: 9/7   : Voiding via urinal   Skin: blanchable redness to coccyx. Bruising thru out. Wears TLSO at all times as ordered.  Pain: 2/10 back pain when resting  Activity: A1/GB/FWW to chair & commode - no walking per order. Strict spine precautions   Plan: OR today

## 2024-09-09 NOTE — BRIEF OP NOTE
[facility]    Brief Operative Note    Pre-operative diagnosis: Closed fracture of ninth thoracic vertebra, unspecified fracture morphology, initial encounter (H) [U89.859V]  Post-operative diagnosis Same as pre-operative diagnosis    Procedure: o-arm/stealth assisted Thoracic 7-11 Minimally Invasive Percutaneous Posterior instrumented fusion with Bone Morphogenic Protein and Allograft, N/A - Spine    Surgeon: Surgeons and Role:     * Salas Guadalupe MD - Primary     * Gerald Medel MD - Resident - Assisting  Anesthesia: General   Estimated Blood Loss: 10 ml    Drains: None  Specimens: * No specimens in log *  Findings:   None.  Complications: None.  Implants:   Implant Name Type Inv. Item Serial No.  Lot No. LRB No. Used Action   GW ORTH 620MM 1.4MM VRST NTNL MN INV DISP [id number] - [id number] Wire GW ORTH 620MM 1.4MM VRST NTNL MN INV DISP [id number]  Celly [id number] N/A 1 Used as a Supply   GRAFT BONE INFUSE BMP XSM [id number] - [id number]  GRAFT BONE INFUSE BMP XSM [id number]  [employer]DAN [id number] N/A 1 Implanted   GRAFT BONE CHIPS CANC CUBE 15CC [id number] - [id number] Bone/Tissue/Biologic GRAFT BONE CHIPS CANC CUBE 15CC [id number] [employer]  N/A 1 Implanted   SCREW BN 40MM 5.5MM PA XTD TAB  NS VRST SPNE  [id number] - [id number] Metallic Hardware/Clements SCREW BN 40MM 5.5MM PA XTD TAB  NS VRST SPNE  -[id number]  Celly NA N/A 4 Implanted   SCREW BN 45MM 5.5MM PA XTD TAB  NS VRST SPNE  [id number] - [id number] Metallic Hardware/Clements SCREW BN 45MM 5.5MM PA XTD TAB  NS VRST SPNE  -[id number]  GHAZALA [employer] NA N/A 3 Implanted   SCREW BN 45MM 6.5MM PA XTD TAB  NS VRST SPNE  -[id number] - [id number] Metallic Hardware/Clements SCREW BN 45MM 6.5MM PA XTD TAB  NS VRST SPNE  -[id number]  Celly NA N/A 2 Implanted   IMP SCR SET BONE EVEREST PA TI [id number] - [id number]  Metallic Hardware/[place] IMP SCR SET BONE EVEREST PA TI [id number]  K2M NA N/A 10 Implanted   SCREW BN 40MM 5.5MM PA XTD TAB  NS VRST SPNE  -[id number] - [id number] Metallic Hardware/[place] SCREW BN 40MM 5.5MM PA XTD TAB  NS VRST [facility] -[id number]  GHAZALA [facility] NA N/A 1 Implanted and Explanted   5.5 X 100 contoured ronni    GHAZALA N/A N/A 1 Implanted   5.5 X 150 straight ronni    GHAZALA N/A N/A 1 Implanted

## 2024-09-09 NOTE — OP NOTE
NEUROSURGERY OPERATIVE NOTE    DATE OF PROCEDURE: 9/9/2024     PREOPERATIVE DIAGNOSIS:    Unstable T9 vertebral body Chance fracture     POSTOPERATIVE DIAGNOSIS:  Unstable T9 vertebral body Chance fracture    PROCEDURES PERFORMED:  Placement of  Bilateral Percutaneous screws at T7 (left), T8, T9, T10 and T11.  Treatment and reduction of T9 vertebral body fracture.  T7-T11 posterior arthrodesis with Bone Morphogenic Protein (BMP) and allograft bone chips.  Use of intraoperative O-arm with stealth navigation.  Use of intraoperative neuromonitoring.     ATTENDING SURGEON:  Dr. Salas Guadalupe MD     RESIDENT SURGEON:     Gerald Medel MD, PhD    ESTIMATED BLOOD LOSS: 10 mL    ANESTHESIA: General    IMPLANTS:   [place] system  T7: left - 5.5 x 40, right - none  T8: left - 5.5 x 40; right - 5.5 x 40  T9: left - 5.5 x 45; right - 5.5 x 45  T10: left - 5.5 x 45; right - 5.5 x 40  T11: left - 6.5 x 45; right - 6.5 x 45  Set screws x9, Tanisha  Vega, 5.5 x 100 mm, [place]  Vega, 5.5 x 150 mm, Tanisha  BMP, lot #[id number], [employer]  Bone graft, serial #[id number], [employer]    DRAINS: none    FINDINGS: Stable neuromonitoring throughout the case     INDICATION FOR THE PROCEDURE:   Mr Leon is a 84 year old male with past medical history significant for stage IIIb chronic kidney disease, diabetes, hypertension and history of prostate cancer, who presented with a traumatic unstable T9 fracture after transfer [facility] the [facility].  Due to the nature of the fracture, we recommended the above-mentioned procedure.  The patient agreed and consented to the procedure after the risks and benefits and alternative treatment options were explained.    DESCRIPTION OF PROCEDURE:  The patient was brought to the operating room and the identity of the patient was confirmed. General anesthesia was initiated.  A [place] and A-line were placed and neuromonitoring was initiated.  The patient was positioned prone with his head resting  on a foam mask and all pressure points were padded. The surgical site was prepped and draped in a common surgical fashion using chlorhexidine, ethanol, Betadine and DuraPrep.  A time out was performed confirming the patient's name, the procedure to be performed and the side of the procedure.     Approximately 30 mL of lidocaine with epinephrine were injected along the anticipated incisions.  First, using a #10 blade, a small midline incision was made and the spinous process of T12 was exposed with the monopolar to allow placement of the Stealth navigation frame.  The O-arm was brought in and a CT spin was obtained.    Next, the placement of the rods from T7-T11 were planned and mapped out.  Again using #10 blade, small incisions were made through the epidermis and dermis and through the fascia.  A Stealth navigated trocar was used to identify the screw entry point and was advanced to the final depth of the screw placement.  A K wire was now placed and the trocar was removed.  Using the guidance of the K wire as well as neuronavigation, the screw placement trajectory was tapped followed by the actual screw placement under stealth navigation and use of the K wire.  This was repeated for the left and right side until screws were placed in T7-T11. The facet joints were decorticated and BMP along with bone chips were placed for arthrodesis.    All wounds were irrigated with copious amounts of normal saline and hemostasis was achieved with the bipolar.  Another O-arm spin was obtained showing that the right T7 screw was too medial.  The screw was removed and not replaced due to anticipated difficulties of redirecting the initial trajectory. Next, the rods were brought into the field and measured and bent to align with the patient's thoracic kyphosis.  Set screws were placed to keep the rods in place and the rods were compressed to reduce the fracture and the set screws were final tightened.    The surgical wounds were  irrigated with copious amounts of normal saline. Next, attention was turned to the closure. The fascia was closed with 0 vicryl, followed by a subdermal layer with 2-0 vicryl. The skin was reapproximated with staples. The incision was cleaned with a wet and dry. Bacitracin ointment and a sterile dressing were applied.    The patient was extubated and transferred to PACU. The patient tolerated the procedure well.     At the end of the procedure, all counts of sponges, needles and instruments were correct x2.    Dr. Guadalupe was present and scrubbed in for the key portion of the procedure.       As dictated by:  Gerald Medel MD  Neurosurgery Resident, PGY-6      Addendum:    I agree with the operative report as documented in the resident's note.  I was present for the entire procedure.    Salas Guadalupe MD

## 2024-09-10 ENCOUNTER — APPOINTMENT (OUTPATIENT)
Dept: GENERAL RADIOLOGY | Facility: CLINIC | Age: 84
DRG: 459 | End: 2024-09-10
Attending: NURSE PRACTITIONER
Payer: COMMERCIAL

## 2024-09-10 ENCOUNTER — APPOINTMENT (OUTPATIENT)
Dept: OCCUPATIONAL THERAPY | Facility: CLINIC | Age: 84
DRG: 459 | End: 2024-09-10
Payer: COMMERCIAL

## 2024-09-10 ENCOUNTER — APPOINTMENT (OUTPATIENT)
Dept: PHYSICAL THERAPY | Facility: CLINIC | Age: 84
DRG: 459 | End: 2024-09-10
Payer: COMMERCIAL

## 2024-09-10 LAB
ANION GAP SERPL CALCULATED.3IONS-SCNC: 15 MMOL/L (ref 7–15)
BUN SERPL-MCNC: 31.9 MG/DL (ref 8–23)
CALCIUM SERPL-MCNC: 8.7 MG/DL (ref 8.8–10.4)
CHLORIDE SERPL-SCNC: 105 MMOL/L (ref 98–107)
CREAT SERPL-MCNC: 1.64 MG/DL (ref 0.67–1.17)
EGFRCR SERPLBLD CKD-EPI 2021: 40 ML/MIN/1.73M2
ERYTHROCYTE [DISTWIDTH] IN BLOOD BY AUTOMATED COUNT: 12.6 % (ref 10–15)
GLUCOSE BLDC GLUCOMTR-MCNC: 105 MG/DL (ref 70–99)
GLUCOSE BLDC GLUCOMTR-MCNC: 105 MG/DL (ref 70–99)
GLUCOSE BLDC GLUCOMTR-MCNC: 108 MG/DL (ref 70–99)
GLUCOSE BLDC GLUCOMTR-MCNC: 109 MG/DL (ref 70–99)
GLUCOSE BLDC GLUCOMTR-MCNC: 112 MG/DL (ref 70–99)
GLUCOSE BLDC GLUCOMTR-MCNC: 115 MG/DL (ref 70–99)
GLUCOSE BLDC GLUCOMTR-MCNC: 116 MG/DL (ref 70–99)
GLUCOSE BLDC GLUCOMTR-MCNC: 119 MG/DL (ref 70–99)
GLUCOSE BLDC GLUCOMTR-MCNC: 128 MG/DL (ref 70–99)
GLUCOSE BLDC GLUCOMTR-MCNC: 128 MG/DL (ref 70–99)
GLUCOSE BLDC GLUCOMTR-MCNC: 129 MG/DL (ref 70–99)
GLUCOSE BLDC GLUCOMTR-MCNC: 129 MG/DL (ref 70–99)
GLUCOSE BLDC GLUCOMTR-MCNC: 131 MG/DL (ref 70–99)
GLUCOSE BLDC GLUCOMTR-MCNC: 134 MG/DL (ref 70–99)
GLUCOSE BLDC GLUCOMTR-MCNC: 137 MG/DL (ref 70–99)
GLUCOSE BLDC GLUCOMTR-MCNC: 139 MG/DL (ref 70–99)
GLUCOSE BLDC GLUCOMTR-MCNC: 143 MG/DL (ref 70–99)
GLUCOSE BLDC GLUCOMTR-MCNC: 167 MG/DL (ref 70–99)
GLUCOSE BLDC GLUCOMTR-MCNC: 168 MG/DL (ref 70–99)
GLUCOSE BLDC GLUCOMTR-MCNC: 170 MG/DL (ref 70–99)
GLUCOSE SERPL-MCNC: 138 MG/DL (ref 70–99)
HCO3 SERPL-SCNC: 16 MMOL/L (ref 22–29)
HCT VFR BLD AUTO: 36.1 % (ref 40–53)
HGB BLD-MCNC: 12 G/DL (ref 13.3–17.7)
MAGNESIUM SERPL-MCNC: 1.8 MG/DL (ref 1.7–2.3)
MCH RBC QN AUTO: 33 PG (ref 26.5–33)
MCHC RBC AUTO-ENTMCNC: 33.2 G/DL (ref 31.5–36.5)
MCV RBC AUTO: 99 FL (ref 78–100)
PHOSPHATE SERPL-MCNC: 3.2 MG/DL (ref 2.5–4.5)
PLATELET # BLD AUTO: 141 10E3/UL (ref 150–450)
POTASSIUM SERPL-SCNC: 4.8 MMOL/L (ref 3.4–5.3)
RBC # BLD AUTO: 3.64 10E6/UL (ref 4.4–5.9)
SODIUM SERPL-SCNC: 136 MMOL/L (ref 135–145)
WBC # BLD AUTO: 9.3 10E3/UL (ref 4–11)

## 2024-09-10 PROCEDURE — 250N000013 HC RX MED GY IP 250 OP 250 PS 637: Performed by: NURSE PRACTITIONER

## 2024-09-10 PROCEDURE — 97530 THERAPEUTIC ACTIVITIES: CPT | Mod: GP

## 2024-09-10 PROCEDURE — 72072 X-RAY EXAM THORAC SPINE 3VWS: CPT | Mod: 26 | Performed by: STUDENT IN AN ORGANIZED HEALTH CARE EDUCATION/TRAINING PROGRAM

## 2024-09-10 PROCEDURE — 250N000011 HC RX IP 250 OP 636: Performed by: NURSE PRACTITIONER

## 2024-09-10 PROCEDURE — 97530 THERAPEUTIC ACTIVITIES: CPT | Mod: GO

## 2024-09-10 PROCEDURE — 97116 GAIT TRAINING THERAPY: CPT | Mod: GP

## 2024-09-10 PROCEDURE — 36415 COLL VENOUS BLD VENIPUNCTURE: CPT | Performed by: NURSE PRACTITIONER

## 2024-09-10 PROCEDURE — 97161 PT EVAL LOW COMPLEX 20 MIN: CPT | Mod: GP

## 2024-09-10 PROCEDURE — 83735 ASSAY OF MAGNESIUM: CPT | Performed by: NURSE PRACTITIONER

## 2024-09-10 PROCEDURE — 80048 BASIC METABOLIC PNL TOTAL CA: CPT | Performed by: NURSE PRACTITIONER

## 2024-09-10 PROCEDURE — 258N000003 HC RX IP 258 OP 636: Performed by: NURSE PRACTITIONER

## 2024-09-10 PROCEDURE — 250N000009 HC RX 250: Performed by: NURSE PRACTITIONER

## 2024-09-10 PROCEDURE — 84100 ASSAY OF PHOSPHORUS: CPT | Performed by: NURSE PRACTITIONER

## 2024-09-10 PROCEDURE — 85027 COMPLETE CBC AUTOMATED: CPT | Performed by: NURSE PRACTITIONER

## 2024-09-10 PROCEDURE — 999N000065 XR THORACIC SPINE 3 VIEWS

## 2024-09-10 PROCEDURE — 97165 OT EVAL LOW COMPLEX 30 MIN: CPT | Mod: GO

## 2024-09-10 PROCEDURE — 120N000002 HC R&B MED SURG/OB UMMC

## 2024-09-10 RX ORDER — OXYCODONE HYDROCHLORIDE 5 MG/1
5 TABLET ORAL
Status: DISCONTINUED | OUTPATIENT
Start: 2024-09-10 | End: 2024-09-24 | Stop reason: HOSPADM

## 2024-09-10 RX ORDER — HYDROXYZINE HYDROCHLORIDE 10 MG/1
10 TABLET, FILM COATED ORAL 3 TIMES DAILY PRN
Status: DISCONTINUED | OUTPATIENT
Start: 2024-09-10 | End: 2024-09-24 | Stop reason: HOSPADM

## 2024-09-10 RX ORDER — POLYETHYLENE GLYCOL 3350 17 G/17G
17 POWDER, FOR SOLUTION ORAL DAILY
Status: DISCONTINUED | OUTPATIENT
Start: 2024-09-10 | End: 2024-09-12

## 2024-09-10 RX ORDER — LIDOCAINE 4 G/G
2 PATCH TOPICAL
Status: DISCONTINUED | OUTPATIENT
Start: 2024-09-10 | End: 2024-09-24 | Stop reason: HOSPADM

## 2024-09-10 RX ORDER — BISACODYL 10 MG
10 SUPPOSITORY, RECTAL RECTAL DAILY PRN
Status: DISCONTINUED | OUTPATIENT
Start: 2024-09-12 | End: 2024-09-20

## 2024-09-10 RX ORDER — OXYCODONE HYDROCHLORIDE 5 MG/1
5 TABLET ORAL EVERY 4 HOURS PRN
Status: DISCONTINUED | OUTPATIENT
Start: 2024-09-10 | End: 2024-09-10

## 2024-09-10 RX ADMIN — SIMVASTATIN 40 MG: 40 TABLET, FILM COATED ORAL at 22:10

## 2024-09-10 RX ADMIN — METHOCARBAMOL 500 MG: 500 TABLET ORAL at 07:39

## 2024-09-10 RX ADMIN — HYDROXYZINE HYDROCHLORIDE 10 MG: 10 TABLET ORAL at 14:59

## 2024-09-10 RX ADMIN — SODIUM CHLORIDE: 9 INJECTION, SOLUTION INTRAVENOUS at 05:44

## 2024-09-10 RX ADMIN — Medication 1 TABLET: at 07:38

## 2024-09-10 RX ADMIN — METHOCARBAMOL 500 MG: 500 TABLET ORAL at 11:53

## 2024-09-10 RX ADMIN — FAMOTIDINE 20 MG: 20 TABLET ORAL at 20:03

## 2024-09-10 RX ADMIN — FAMOTIDINE 20 MG: 20 TABLET ORAL at 07:38

## 2024-09-10 RX ADMIN — LIDOCAINE 2 PATCH: 560 PATCH PERCUTANEOUS; TOPICAL; TRANSDERMAL at 14:46

## 2024-09-10 RX ADMIN — OXYCODONE HYDROCHLORIDE 5 MG: 5 TABLET ORAL at 13:00

## 2024-09-10 RX ADMIN — POLYETHYLENE GLYCOL 3350 17 G: 17 POWDER, FOR SOLUTION ORAL at 07:38

## 2024-09-10 RX ADMIN — CEFAZOLIN 1 G: 1 INJECTION, POWDER, FOR SOLUTION INTRAMUSCULAR; INTRAVENOUS at 03:52

## 2024-09-10 RX ADMIN — SENNOSIDES AND DOCUSATE SODIUM 1 TABLET: 8.6; 5 TABLET ORAL at 20:03

## 2024-09-10 RX ADMIN — ACETAMINOPHEN 975 MG: 325 TABLET ORAL at 09:01

## 2024-09-10 RX ADMIN — HYDROMORPHONE HYDROCHLORIDE 0.4 MG: 0.2 INJECTION, SOLUTION INTRAMUSCULAR; INTRAVENOUS; SUBCUTANEOUS at 02:22

## 2024-09-10 RX ADMIN — METHOCARBAMOL 500 MG: 500 TABLET ORAL at 20:03

## 2024-09-10 RX ADMIN — ACETAMINOPHEN 975 MG: 325 TABLET ORAL at 01:12

## 2024-09-10 RX ADMIN — SENNOSIDES AND DOCUSATE SODIUM 1 TABLET: 8.6; 5 TABLET ORAL at 07:38

## 2024-09-10 RX ADMIN — SODIUM CHLORIDE: 9 INJECTION, SOLUTION INTRAVENOUS at 17:04

## 2024-09-10 RX ADMIN — Medication 25 MCG: at 07:38

## 2024-09-10 RX ADMIN — AMITRIPTYLINE HYDROCHLORIDE 10 MG: 10 TABLET, FILM COATED ORAL at 22:10

## 2024-09-10 RX ADMIN — GABAPENTIN 100 MG: 100 CAPSULE ORAL at 22:10

## 2024-09-10 RX ADMIN — METHOCARBAMOL 500 MG: 500 TABLET ORAL at 16:37

## 2024-09-10 RX ADMIN — INSULIN HUMAN 0.5 UNITS/HR: 1 INJECTION, SOLUTION INTRAVENOUS at 22:44

## 2024-09-10 ASSESSMENT — ACTIVITIES OF DAILY LIVING (ADL)
ADLS_ACUITY_SCORE: 30
ADLS_ACUITY_SCORE: 32
ADLS_ACUITY_SCORE: 32
ADLS_ACUITY_SCORE: 31
ADLS_ACUITY_SCORE: 32
ADLS_ACUITY_SCORE: 31
ADLS_ACUITY_SCORE: 32
ADLS_ACUITY_SCORE: 31
ADLS_ACUITY_SCORE: 32
ADLS_ACUITY_SCORE: 31
ADLS_ACUITY_SCORE: 32
ADLS_ACUITY_SCORE: 31
ADLS_ACUITY_SCORE: 32
ADLS_ACUITY_SCORE: 31
ADLS_ACUITY_SCORE: 32
ADLS_ACUITY_SCORE: 31
ADLS_ACUITY_SCORE: 30
ADLS_ACUITY_SCORE: 32
ADLS_ACUITY_SCORE: 32

## 2024-09-10 NOTE — PLAN OF CARE
Care from [id number]  Status: POD 0 s/p stealth assisted T7-11 minimally invasive percutaneous posterior instrumented fusion with bone morphogenic protein and allograft. Hx CKD stage III, DM, HTN, prostate cancer, anemia.   Vitals: HTN within parameters, SBP <180  Neuros: A&Ox4, numbness to bilateral feet at baseline. Generalized weakness.   IV: PIV infusing NS @ 85 mL/hr and insulin gtt. Currently in Algorithm 2.    Resp/trach: WNL  Diet: Regular, Scopolamine patch behind R ear.   Bowel status: LBM 9/7  : Voiding spontaneously   Skin: Scattered bruising throughout.  Pain: Back pain managed with PRN oxycodone an dilaudid.   Activity: Not OOB this shift. TLSO when OOB.   Plan: Continue with current POC and pain management.

## 2024-09-10 NOTE — PHARMACY-CONSULT NOTE
Pharmacy Delirium Chart Review    Upon chart review, the following medications may contribute to possible patient delirium: scopolamine can cause agitation, confusion, and delirium. It is dose related and has a rapid onset of action (typically within 1-3 days of patch placement); opioids can cause mental disturbances; Amitriptyline can cause CNS depression which can be exacerbated by other CNS depressants. Please consult unit pharmacist with further questions.    Henrik Doherty RPH  Phone/Pager: [phone]

## 2024-09-10 NOTE — PROGRESS NOTES
"   09/10/24 1100   Appointment Info   Signing Clinician's Name / Credentials (OT) Maia Can, OTR/L   Rehab Comments (OT) Spinal; TLSO when OOB   Living Environment   People in Home spouse   Current Living Arrangements house   Home Accessibility stairs within home   Living Environment Comments Pt is a questionable historian this date. Reports living in rented 2 story home with bedroom/bathroom upstairs (unclear # of stairs or railings). Reports being IND with ADLs/mobility prior to admit.   Self-Care   Usual Activity Tolerance good   Current Activity Tolerance fair   Equipment Currently Used at Home none   Fall history within last six months yes   General Information   Onset of Illness/Injury or Date of Surgery 09/05/24   Referring Physician Ivonne Mccullough, APRN CNP   Patient/Family Therapy Goal Statement (OT) None stated   Additional Occupational Profile Info/Pertinent History of Current Problem Judah Leon is a 84 year old male  s/p Thoracic 7-11 Minimally Invasive Percutaneous Posterior instrumented fusion with Dr. Salas Guadalupe on 9/9/2024   Existing Precautions/Restrictions fall;spinal;brace worn when out of bed   Limitations/Impairments safety/cognitive   Cognitive Status Examination   Cognitive Status Comments Alert though lethargic, initially awake though then requiring cuing to keep eyes open/attend throughout session. Intermittently oriented to situation/place though throughout session asking \"where am I?\" and clarifying if he had surgery yesterday. Follows 100% of 1-step commands with cuing and inc'd time for processing. Decreased insight/safety awareness.   Visual Perception   Impact of Vision Impairment on Function (Vision) Wears glasses all the time; present in room   Sensory   Sensory Comments Declines changes; SILT x4 extremities   Pain Assessment   Patient Currently in Pain   (Yes, endorses pain at rest + with movement, no number stated)   Posture   Posture forward head position;kyphosis "   Range of Motion Comprehensive   Comment, General Range of Motion Not formally assessed 2/2 cognition this date, appears WFL for basic self-cares   Strength Comprehensive (MMT)   Comment, General Manual Muscle Testing (MMT) Assessment No formal MMT, WFL for basic self-cares + mobility; no focal impairments observed   Bed Mobility   Bed Mobility supine-sit   Supine-Sit [place] (Bed Mobility) maximum assist (25% patient effort)   Transfers   Transfers sit-stand transfer;toilet transfer   Sit-Stand Transfer   Sit-Stand [place] (Transfers) minimum assist (75% patient effort);2 person assist   Toilet Transfer   [place] Level (Toilet Transfer) minimum assist (75% patient effort);2 person assist   Toilet Transfer Comments per clinical judgement   Balance   Balance Assessment sitting static balance   Sitting Balance: Static minimal assist   Activities of Daily Living   BADL Assessment/Intervention upper body dressing;lower body dressing;toileting;grooming   Upper Body Dressing Assessment/Training   Comment, (Upper Body Dressing) per clinical judgement   [place] Level (Upper Body Dressing) maximum assist (25% patient effort)   Lower Body Dressing Assessment/Training   [place] Level (Lower Body Dressing) maximum assist (25% patient effort)   Grooming Assessment/Training   [place] Level (Grooming) minimum assist (75% patient effort)   Comment, (Grooming) per clinical judgement   Toileting   Comment, (Toileting) per clinical judgement   [place] Level (Toileting) maximum assist (25% patient effort)   Clinical Impression   Criteria for Skilled Therapeutic Interventions Met (OT) Yes, treatment indicated   OT Diagnosis impaired ADLs, impaired functional mobility   OT Problem List-Impairments impacting ADL problems related to;activity tolerance impaired;balance;cognition;mobility;post-surgical precautions   Assessment of Occupational Performance 3-5 Performance Deficits   Identified Performance  Deficits dressing, bathing, toileting, functional mobility, home mgmt   Planned Therapy Interventions (OT) ADL retraining;balance training;bed mobility training;orthotic fitting/training;transfer training;progressive activity/exercise   Clinical Decision Making Complexity (OT) problem focused assessment/low complexity   Risk & Benefits of therapy have been explained evaluation/treatment results reviewed;participants included;patient   Clinical Impression Comments Pt would benefit from ongoing skilled OT while he remains IP to optimize his independence and safety in his daily routine.   OT Total Evaluation Time   OT Eval, Low Complexity Minutes (88497) 10   OT Goals   Therapy Frequency (OT) 6 times/week   OT Predicted Duration/Target Date for Goal Attainment 09/30/24   OT Goals Lower Body Dressing;Toilet Transfer/Toileting;Hygiene/Grooming;Upper Body Dressing   OT: Hygiene/Grooming modified independent;within precautions   OT: Upper Body Dressing Minimal assist;including orthotic;within precautions   OT: Lower Body Dressing Modified independent;within precautions   OT: Toilet Transfer/Toileting Modified independent;within precautions   OT Discharge Planning   OT Plan trial FWW>commode/bathroom, reinforce spinal prec, seated LB dressing   OT Discharge Recommendation (DC Rec) Transitional Care Facility   OT Rationale for DC Rec Pt remains significantly below baseline functional status, requiring assist with all ADLs/functional mobility. Primarily limited by cognitive status this date, impacting safety/progression of mobility. Rec TCU for ongoing skilled OT given not safe to discharge home this date.   OT Brief overview of current status Assist x 2, gait belt   Total Session Time   Total Session Time (sum of timed and untimed services) 10

## 2024-09-10 NOTE — PLAN OF CARE
Status: Pt POD 0 o-arm/stealth assisted Thoracic 7-11 Minimally Invasive Percutaneous Posterior instrumented fusion with Bone Morphogenic Protein and Allograft. PMHx: stage III CKD, DM, HTN, prostate cancer, anemia.  Vitals: HTN within parameters, SBP<180  Neuros: A&O x4, numbness to b/l feet at baseline improving per pt. Strengths 5/5 with GW   IV: 2 PIV, SL and infusing NS at 85 ml/hr and insulin gtt currently in alg 2    Resp/trach: LSC, denies SOB  Diet: Regular, good intake   Bowel status: LBM 9/7   : DTV, spencer removed at 2000  Skin: Scattered bruising, back incision CDI  Pain: Back pain managed with PRN oxycodone. IV zofran given for nausea r/t vertigo, scopolamine patch behind R ear   Activity: Not OOB this shift. TLSO when OOB    Plan: Continue POC     Arrived from: PACU    Belongings/meds: remain in pt room   2 RN Skin Assessment Completed by: Terra     Non-intact findings documented (yes/no/NA): see above

## 2024-09-10 NOTE — PLAN OF CARE
Neuro: Patient was alert and oriented x4 most of the day, but became restless and confused this afternoon. Pt was c/o pain oxy was given once. Later complained about itchy, provider was notified and atarax was ordered.      Cardiac: Not on tele. VSS.   Respiratory: Sating above 95 on RA.  GI/: Adequate urine output. BM X1  Diet/appetite: Tolerating regular diet. Eating well.  Activity:  Assist of 1 with walker and gait belt  Pain: At acceptable level on current regimen.   Skin: No new deficits noted.  LDA's: 2 PIV with insulin infusing at 2 units, and NS at 85 mL  Plan: Continue with POC. Notify primary team with changes.  Goal Outcome Evaluation:  Overall Patient Progress: no changeOverall Patient Progress: no change

## 2024-09-10 NOTE — PROVIDER NOTIFICATION
[id number] Leon   Pt experiencing severe vertigo/nausea, says this happens at home. Also insulin gtt in alg 2, BG within target range for 4 hours, although elevated now after eating dinner.   Aj Mcintosh [phone]

## 2024-09-10 NOTE — PROGRESS NOTES
IP PT Evaluation:      09/10/24 1131   Appointment Info   Signing Clinician's Name / Credentials (PT) Milton Dobson, PT, DPT   Living Environment   People in Home spouse   Current Living Arrangements house   Home Accessibility stairs within home   Number of Stairs, Within Home, Primary greater than 10 stairs  (split level; 7 up/down)   Stair Railings, Within Home, Primary railings on both sides of stairs   Transportation Anticipated family or friend will provide   Living Environment Comments Lives with spouse in a split-level home, 7 stairs up/down with B railings, main living upstairs.   Self-Care   Equipment Currently Used at Home none   Fall history within last six months yes   Number of times patient has fallen within last six months 1   Activity/Exercise/Self-Care Comment IND with mobility and I/ADLs at baseline.   General Information   Onset of Illness/Injury or Date of Surgery 09/05/24   Referring Physician Ivonne Mccullough APRN CNP   Patient/Family Therapy Goals Statement (PT) Return home   Pertinent History of Current Problem (include personal factors and/or comorbidities that impact the POC) Per EMR: Judah Leon is a 84 year old male  s/p Thoracic 7-11 Minimally Invasive Percutaneous Posterior instrumented fusion with Dr. Salas Guadalupe on 9/9/2024   Existing Precautions/Restrictions fall;brace worn when out of bed;spinal  (TLSO when OOB)   Weight-Bearing Status - LUE partial weight-bearing (% in comments)   Weight-Bearing Status - RUE partial weight-bearing (% in comments)   General Observations Pt up in chair, motivated to participate.   Cognition   Cognitive Status Comments Intermittently confused and tangential. Needs redirection. Otherwise alert and engages, following commands.   Posture    Posture Forward head position   Range of Motion (ROM)   Range of Motion ROM is WFL   Strength (Manual Muscle Testing)   Strength (Manual Muscle Testing) Deficits observed during functional mobility   Strength  Comments Grossly deconditioned; moves all extremities against gravity; no focal deficits noted   Bed Mobility   Comment, (Bed Mobility) impaired per clinical reasoning   Transfers   Comment, (Transfers) Diana sit<>stand, FWW   Gait/Stairs (Locomotion)   Comment, (Gait/Stairs) CGA, FWW   Balance   Balance Comments Relies on BUE support with FWW with standing/gait   Sensory Examination   Sensory Perception patient reports no sensory changes   Coordination   Coordination no deficits were identified   Clinical Impression   Criteria for Skilled Therapeutic Intervention Yes, treatment indicated   PT Diagnosis (PT) Impaired functional mobility   Influenced by the following impairments Deconditioning, confusion, spinal precautions   Functional limitations due to impairments safety awareness, bed mobility, transfers, gait, balance, stairs, activity tolerance, self cares   Clinical Presentation (PT Evaluation Complexity) stable   Clinical Presentation Rationale Clinical judgment   Clinical Decision Making (Complexity) low complexity   Planned Therapy Interventions (PT) balance training;bed mobility training;gait training;home exercise program;neuromuscular re-education;patient/family education;stair training;strengthening;transfer training;progressive activity/exercise;risk factor education;home program guidelines   Risk & Benefits of therapy have been explained evaluation/treatment results reviewed;care plan/treatment goals reviewed;risks/benefits reviewed;current/potential barriers reviewed;participants voiced agreement with care plan;participants included;patient;spouse/significant other;son   PT Total Evaluation Time   PT Eval, Low Complexity Minutes (92460) 6   Physical Therapy Goals   PT Frequency 6x/week   PT Predicted Duration/Target Date for Goal Attainment 09/24/24   PT Goals Bed Mobility;Transfers;Gait;Stairs   PT: Bed Mobility Independent;Supine to/from sit;Within precautions   PT: Transfers Independent;Sit to/from  stand;Within precautions   PT: Gait Modified independent;Assistive device;Greater than 200 feet;Within precautions  (LRAD vs no AD)   PT: Stairs Modified independent;Greater than 10 stairs;Rail on both sides;Within precautions   PT Discharge Planning   PT Plan bed mobility, progress gait with LRAD, try stairs when appropriate, likely need reinforcement of spinal precautions   PT Discharge Recommendation (DC Rec) Acute [facility]hab Center-Motivated patient will benefit from intensive, interdisciplinary therapy.  Anticipate will be able to tolerate 3 hours of therapy per day;home with assist;home with outpatient physical therapy   PT Rationale for DC Rec Pt limited by generalized weakness/deconditioning, spinal precautions and mild confusion. IND and active at baseline. Motivated to participate and has good family support. At this time recommending ARU to max functional indep prior to returning home. Pending LOS, anticipate pt will progress to home with OP therapies. Will continue to update rec's as appropriate.   PT Brief overview of current status Diana sit<>stands, CGA gait with FWW; encourage up to chair and ambulate with staff as able   Total Session Time   Total Session Time (sum of timed and untimed services) 6

## 2024-09-10 NOTE — PROGRESS NOTES
[facility], [place]   Neurosurgery Progress Note:    Date of service: 9/10/2024    Assessment: Judah Leon is a 84 year old male  s/p Thoracic 7-11 Minimally Invasive Percutaneous Posterior instrumented fusion with Dr. Salas Guadalupe on 9/9/2024    Clinically Significant Risk Factors Present on Admission                 -Acute Kidney Injury     Plan:  - Neuro checks/vital signs: Q4 hours  - SBP: <160  - Pain control: PRN IV dilaudid, PO oxycodone  - Activity: up with assist   - Restrictions/Bracing: TLSO when out of bed   - Diet: regular   - Drain: none  -  ABX:  Ancef while drain in place   - Anti-coagulation: none   - DVT prophylaxis: SCDs, subcutaneous heparin to begin on 9/11/24  - Imaging:  upright x-rays pending  - PT/OT: pending  - Appreciate Endocrinology assistance regarding blood glucose control      ASH Muñoz, CNP  Department of Neurosurgery  Pager: [phone]         Interval History:  Endorsing expected incisional pain.   Has not been out of bed since surgery.  Experienced nausea and vomiting overnight, resolved this morning.            Objective:   Temp:  [97.2  F (36.2  C)-98.4  F (36.9  C)] 98.2  F (36.8  C)  Pulse:  [] 82  Resp:  [13-26] 16  BP: (119-179)/() 141/95  SpO2:  [95 %-100 %] 97 %  I/O last 3 completed shifts:  In: 924.85 [I.V.:922.85; IV Piggyback:2]  Out: 855 [Urine:845; Blood:10]    Gen: Appears comfortable, NAD  Wound: Incision, clean, dry, intact without strikethrough  Neurologic:  - Alert & Oriented to person, place, time, and situation  - Follows commands briskly  - Speech fluent, spontaneous. No aphasia or dysarthria.  - No gaze preference. No apparent hemineglect.  - PERRL, EOMI  - Strong eye closure, jaw clench, and cheek puff  - Face symmetric with sensation intact to light touch  - Palate elevates symmetrically, uvula midline, tongue protrudes midline  - Trapezii and sternocleidomastoid muscles 5/5 bilaterally  - No  pronator drift     Del Tr Bi WE WF Gr   R 5 5 5 5 5 5   L 5 5 5 5 5 5    HF KE KF DF PF EHL   R 5 5 5 5 5 5   L 5 5 5 5 5 5     Reflexes 2+ throughout    Sensation intact and symmetric to light touch throughout    LABS  Recent Labs   Lab Test 09/10/24  0534 09/09/24 0436 09/08/24 0442   WBC 9.3 6.8 6.9   HGB 12.0* 12.8* 12.5*   MCV 99 98 98   * 153 144*       Recent Labs   Lab Test 09/10/24  0617 09/10/24  0534 09/10/24  0301 09/09/24  0637 09/09/24 0436 09/08/24 0816 09/08/24 0442   NA  --  136  --   --  136  --  137   POTASSIUM  --  4.8  --   --  4.7  --  4.4   CHLORIDE  --  105  --   --  104  --  105   CO2  --  16*  --   --  21*  --  19*   BUN  --  31.9*  --   --  32.3*  --  31.6*   CR  --  1.64*  --   --  1.47*  --  1.32*   ANIONGAP  --  15  --   --  11  --  13   ROB  --  8.7*  --   --  9.2  --  9.1   * 138* 115*   < > 259*   < > 258*    < > = values in this interval not displayed.       IMAGING    Recent Results (from the past 24 hour(s))   XR Surgery ERMELINDA L/T 5 Min Fluoro w Stills    Narrative    This exam was marked as non-reportable because it will not be read by a   radiologist or a [place] non-radiologist provider.

## 2024-09-11 ENCOUNTER — APPOINTMENT (OUTPATIENT)
Dept: OCCUPATIONAL THERAPY | Facility: CLINIC | Age: 84
DRG: 459 | End: 2024-09-11
Attending: NEUROLOGICAL SURGERY
Payer: COMMERCIAL

## 2024-09-11 ENCOUNTER — APPOINTMENT (OUTPATIENT)
Dept: PHYSICAL THERAPY | Facility: CLINIC | Age: 84
DRG: 459 | End: 2024-09-11
Attending: NEUROLOGICAL SURGERY
Payer: COMMERCIAL

## 2024-09-11 LAB
ANION GAP SERPL CALCULATED.3IONS-SCNC: 13 MMOL/L (ref 7–15)
BUN SERPL-MCNC: 26.7 MG/DL (ref 8–23)
CALCIUM SERPL-MCNC: 8.5 MG/DL (ref 8.8–10.4)
CHLORIDE SERPL-SCNC: 105 MMOL/L (ref 98–107)
CREAT SERPL-MCNC: 1.39 MG/DL (ref 0.67–1.17)
EGFRCR SERPLBLD CKD-EPI 2021: 49 ML/MIN/1.73M2
ERYTHROCYTE [DISTWIDTH] IN BLOOD BY AUTOMATED COUNT: 12.6 % (ref 10–15)
GLUCOSE BLDC GLUCOMTR-MCNC: 129 MG/DL (ref 70–99)
GLUCOSE BLDC GLUCOMTR-MCNC: 130 MG/DL (ref 70–99)
GLUCOSE BLDC GLUCOMTR-MCNC: 142 MG/DL (ref 70–99)
GLUCOSE BLDC GLUCOMTR-MCNC: 150 MG/DL (ref 70–99)
GLUCOSE BLDC GLUCOMTR-MCNC: 154 MG/DL (ref 70–99)
GLUCOSE BLDC GLUCOMTR-MCNC: 155 MG/DL (ref 70–99)
GLUCOSE BLDC GLUCOMTR-MCNC: 169 MG/DL (ref 70–99)
GLUCOSE BLDC GLUCOMTR-MCNC: 170 MG/DL (ref 70–99)
GLUCOSE SERPL-MCNC: 141 MG/DL (ref 70–99)
HCO3 SERPL-SCNC: 19 MMOL/L (ref 22–29)
HCT VFR BLD AUTO: 33.6 % (ref 40–53)
HGB BLD-MCNC: 11.3 G/DL (ref 13.3–17.7)
MAGNESIUM SERPL-MCNC: 1.8 MG/DL (ref 1.7–2.3)
MCH RBC QN AUTO: 32.8 PG (ref 26.5–33)
MCHC RBC AUTO-ENTMCNC: 33.6 G/DL (ref 31.5–36.5)
MCV RBC AUTO: 98 FL (ref 78–100)
PHOSPHATE SERPL-MCNC: 2.8 MG/DL (ref 2.5–4.5)
PLATELET # BLD AUTO: 129 10E3/UL (ref 150–450)
POTASSIUM SERPL-SCNC: 4.4 MMOL/L (ref 3.4–5.3)
RBC # BLD AUTO: 3.44 10E6/UL (ref 4.4–5.9)
SODIUM SERPL-SCNC: 137 MMOL/L (ref 135–145)
WBC # BLD AUTO: 10 10E3/UL (ref 4–11)

## 2024-09-11 PROCEDURE — 250N000012 HC RX MED GY IP 250 OP 636 PS 637: Performed by: STUDENT IN AN ORGANIZED HEALTH CARE EDUCATION/TRAINING PROGRAM

## 2024-09-11 PROCEDURE — 250N000013 HC RX MED GY IP 250 OP 250 PS 637: Performed by: NURSE PRACTITIONER

## 2024-09-11 PROCEDURE — 97530 THERAPEUTIC ACTIVITIES: CPT | Mod: GO

## 2024-09-11 PROCEDURE — 80048 BASIC METABOLIC PNL TOTAL CA: CPT | Performed by: NURSE PRACTITIONER

## 2024-09-11 PROCEDURE — 120N000002 HC R&B MED SURG/OB UMMC

## 2024-09-11 PROCEDURE — 97530 THERAPEUTIC ACTIVITIES: CPT | Mod: GP | Performed by: PHYSICAL THERAPIST

## 2024-09-11 PROCEDURE — 97116 GAIT TRAINING THERAPY: CPT | Mod: GP | Performed by: PHYSICAL THERAPIST

## 2024-09-11 PROCEDURE — 36415 COLL VENOUS BLD VENIPUNCTURE: CPT | Performed by: NURSE PRACTITIONER

## 2024-09-11 PROCEDURE — 250N000011 HC RX IP 250 OP 636: Performed by: NURSE PRACTITIONER

## 2024-09-11 PROCEDURE — 84100 ASSAY OF PHOSPHORUS: CPT | Performed by: NURSE PRACTITIONER

## 2024-09-11 PROCEDURE — 85014 HEMATOCRIT: CPT | Performed by: NURSE PRACTITIONER

## 2024-09-11 PROCEDURE — 83735 ASSAY OF MAGNESIUM: CPT | Performed by: NURSE PRACTITIONER

## 2024-09-11 PROCEDURE — 99222 1ST HOSP IP/OBS MODERATE 55: CPT | Performed by: STUDENT IN AN ORGANIZED HEALTH CARE EDUCATION/TRAINING PROGRAM

## 2024-09-11 RX ORDER — HEPARIN SODIUM 5000 [USP'U]/.5ML
5000 INJECTION, SOLUTION INTRAVENOUS; SUBCUTANEOUS EVERY 8 HOURS
Status: DISCONTINUED | OUTPATIENT
Start: 2024-09-11 | End: 2024-09-24 | Stop reason: HOSPADM

## 2024-09-11 RX ADMIN — METHOCARBAMOL 500 MG: 500 TABLET ORAL at 15:05

## 2024-09-11 RX ADMIN — METHOCARBAMOL 500 MG: 500 TABLET ORAL at 08:16

## 2024-09-11 RX ADMIN — Medication 25 MCG: at 08:17

## 2024-09-11 RX ADMIN — LIDOCAINE 2 PATCH: 560 PATCH PERCUTANEOUS; TOPICAL; TRANSDERMAL at 08:16

## 2024-09-11 RX ADMIN — SENNOSIDES AND DOCUSATE SODIUM 1 TABLET: 8.6; 5 TABLET ORAL at 08:17

## 2024-09-11 RX ADMIN — FAMOTIDINE 20 MG: 20 TABLET ORAL at 08:17

## 2024-09-11 RX ADMIN — OXYCODONE HYDROCHLORIDE 2.5 MG: 5 TABLET ORAL at 21:20

## 2024-09-11 RX ADMIN — SENNOSIDES AND DOCUSATE SODIUM 1 TABLET: 8.6; 5 TABLET ORAL at 19:41

## 2024-09-11 RX ADMIN — ACETAMINOPHEN 975 MG: 325 TABLET ORAL at 16:54

## 2024-09-11 RX ADMIN — SIMVASTATIN 40 MG: 40 TABLET, FILM COATED ORAL at 21:20

## 2024-09-11 RX ADMIN — FAMOTIDINE 20 MG: 20 TABLET ORAL at 19:41

## 2024-09-11 RX ADMIN — METHOCARBAMOL 500 MG: 500 TABLET ORAL at 12:25

## 2024-09-11 RX ADMIN — ACETAMINOPHEN 975 MG: 325 TABLET ORAL at 01:13

## 2024-09-11 RX ADMIN — METHOCARBAMOL 500 MG: 500 TABLET ORAL at 19:41

## 2024-09-11 RX ADMIN — Medication 1 TABLET: at 08:16

## 2024-09-11 RX ADMIN — GABAPENTIN 100 MG: 100 CAPSULE ORAL at 21:20

## 2024-09-11 RX ADMIN — HEPARIN SODIUM 5000 UNITS: 5000 INJECTION, SOLUTION INTRAVENOUS; SUBCUTANEOUS at 19:41

## 2024-09-11 RX ADMIN — AMITRIPTYLINE HYDROCHLORIDE 10 MG: 10 TABLET, FILM COATED ORAL at 21:19

## 2024-09-11 RX ADMIN — ACETAMINOPHEN 975 MG: 325 TABLET ORAL at 09:06

## 2024-09-11 RX ADMIN — INSULIN GLARGINE 8 UNITS: 100 INJECTION, SOLUTION SUBCUTANEOUS at 09:06

## 2024-09-11 RX ADMIN — HEPARIN SODIUM 5000 UNITS: 5000 INJECTION, SOLUTION INTRAVENOUS; SUBCUTANEOUS at 12:25

## 2024-09-11 ASSESSMENT — ACTIVITIES OF DAILY LIVING (ADL)
ADLS_ACUITY_SCORE: 30
ADLS_ACUITY_SCORE: 34
ADLS_ACUITY_SCORE: 30
ADLS_ACUITY_SCORE: 34
ADLS_ACUITY_SCORE: 30
ADLS_ACUITY_SCORE: 30
ADLS_ACUITY_SCORE: 34
ADLS_ACUITY_SCORE: 31
ADLS_ACUITY_SCORE: 30
ADLS_ACUITY_SCORE: 30
ADLS_ACUITY_SCORE: 31

## 2024-09-11 NOTE — CONSULTS
"  Inpatient Diabetes Management Service : New Consult Note     Patient: Judah Leon   YOB: 1940    MRN: [id number]     Date of Consult : 09/11/2024   Date of Admission: 9/5/2024     Reason for Consult: \"post operative glucose management, was on high SSI pre op \"   Consult Requestor: Ivonne Mccullough APRN CNP            History of Present Illness:   Judah Leon is a 84 year old-year-old male with a notable PMHx of stage IIIb chronic kidney disease, diabetes, hypertension, history of prostate cancer, anemia, presenting with unstable T 8-9 fracture. Patient was in his usual state of health and was with his friend on Thursday. Mr. Leon saw his friend start to lose his balance, so he ran to help. Unfortunately, his friend ended up landing on top of the Mr. Leon and fractured his spine at T8-9. He denied loss of consciousness. He denies significant weakness, only endorses sharp pain that begins in his midback and radiates across his abdomen. He denies changes in sensation. He denies incontinence, bowel/bladder symptoms, and saddle anesthesia. [facility]/24 for surgical management. Now s/p Thoracic 7-11 Minimally Invasive Percutaneous Posterior instrumented fusion with Dr. Salas Guadalupe on 9/9/2024.    History obtained via the patient, chart review and discussion with primary team.       Additional Historian:  wife and daughter-in-law    Patient is not known to the Inpatient Diabetes Service from past admission(s).    Last dose insulin PTA: none   Current inpatient regimen:  IV insulin    BG at time of consult: 128 mg/dL    Planned Procedures/Surgeries: none    GAD65 antibody, zinc transporter 8 antibody, islet antibody, insulin antibody, and c-peptide not available on epic search     Inpatient Glucose Trends:           Diabetes History:   Diabetes Type and Duration: T2DM x ~ 15-20 years     PTA Medication Regimen: --> patient unsure of exact " doses, doses per chart review.   Metformin 500 mg BID   Glipizide 2.5 mg daily    Historical Diabetes Medications:    Per PCP visit 8/12/24, recently tried Jardiacne and stopped 2/2 dizzy spells     Glucose monitoring device and frequency: meter, checks fasting glucose daily. Reports readings usually 100-200s.     Outpatient Diabetes Provider: PCP - Willy Obregon MD (last visit 8/12/2024) [facility]     Formal Diabetes Education/Educator: yes, through [facility].    ------------------------  Complications:  denies peripheral neuropathy, + NPDR, Mild OS, No DR right eye per last eye exam notes 8/13/24 with [facility], +nephropathy (CKD stage III), + gastroparesis, + macrovascular disease (stroke 1997)    Last A1c: 8.2% (9/9/24)  Hemoglobin at time of last A1c: mildly low, 12.8 (9/9/24)  RBC transfusion in past 3 months: none      History of DKA: no      Hypoglycemia PTA: none, reports never having a low before     Safety Kit:   - Glucagon: no   - Ketone Strips: no    Diet/Lifestyle:  2 meals a day (breakfast and dinner) plus some small snacks. Lives in independent living with wife.   Ability to [place] Prescribed Regimen:  suspect good, per patient's wife he self-manages and self-administers his medications.    Food/Housing Insecurity:  no         Medications Impacting Glycemia:    Steroids: none currently. Dexamethasone 4mg in OR 9/9  D5W containing solutions/medications: none  Other medications impacting glucose: none         Diet/Nutrition:    Orders Placed This Encounter      Advance Diet as Tolerated: Regular Diet Adult  Supplements:  none  TF: none  TPN: none          Review of Systems:    Constitutional: No fever or chills.   Eyes:  No vision changes.   Cardiac:  No chest pain or palpitations.   Respiratory: No shortness of breath or cough.     GI:  No abdominal pain, nausea, emesis, diarrhea, constipation.   :  No difficulty urinating or dysuria.  Neuro: Reports intermittent vertigo lasting 2-3  "hours that is being worked up outpatient. No numbness, tingling, or burning pain in feet/hands.   MSK/Integumentary: No swelling/edema. No rash, non-healing sores, or lipohypertrophy at injection sites.   Endocrine: No polyuria or polydipsia.          Past Medical History:     No past medical history on file.          Past Surgical History:    History reviewed. No pertinent surgical history.          Social History:      Social History     Tobacco Use    Smoking status: Not on file    Smokeless tobacco: Not on file   Substance Use Topics    Alcohol use: Not on file        History   Sexual Activity    Sexual activity: Not on file        Tobacco: denies    Etoh: 1 beer/week    Marital Status:     Lives with wife in independent living          Family History:    Family History of Diabetes: grandparent and granddaughter with T1DM (diagnosed at 5yoa)    History reviewed. No pertinent family history.          Physical Exam:    BP (!) 160/83 (BP Location: Left arm)   Pulse 97   Temp 98.5  F (36.9  C) (Oral)   Resp 16   Ht 1.702 m (5' 7.01\")   Wt 74.2 kg (163 lb 9.3 oz)   SpO2 96%   BMI 25.61 kg/m     General:  well appearing, NAD, resting comfortably sitting up in chair. Wearing large brace around chest/abdomen. Wife and daughter-in-law at bedside.   Lungs: unlabored breathing on RA.   ABD:  rounded, soft, non-tender  Skin:  warm and dry, no obvious lesions  MSK:   moving all extremities  Lymp:   no LE edema  Feet: warm and dry. no open sores/wounds. Normal gross sensation.   Mental Status:  Alert and oriented x3. Intermittently confused with some difficulty word-finding.   Psych:   Cooperative, good eye contact, full/appropriate affect           Laboratory Data:      Lab Results   Component Value Date    A1C 8.2 (H) 09/09/2024     Recent Labs   Lab Test 09/11/24  0442   WBC 10.0   RBC 3.44*   HGB 11.3*   HCT 33.6*   MCV 98   MCH 32.8   MCHC 33.6   RDW 12.6   *     Recent Labs   Lab Test 09/11/24  0700 " "09/11/24  0610 09/11/24  0501 09/11/24  0442 09/10/24  0617 09/10/24  0534   NA  --   --   --  137  --  136   POTASSIUM  --   --   --  4.4  --  4.8   CHLORIDE  --   --   --  105  --  105   CO2  --   --   --  19*  --  16*   ANIONGAP  --   --   --  13  --  15   * 142*   < > 141*   < > 138*   BUN  --   --   --  26.7*  --  31.9*   CR  --   --   --  1.39*  --  1.64*   ROB  --   --   --  8.5*  --  8.7*    < > = values in this interval not displayed.     No results for input(s): \"PROTTOTAL\", \"ALBUMIN\", \"BILITOTAL\", \"ALKPHOS\", \"AST\", \"ALT\", \"BILIDIRECT\" in the last 39169 hours.         Assessment and Recommendations:       Assessment:   Type 2 Diabetes Mellitus, complicated by NPDR and nephropathy (CKD). Good control for age and comorbidities, A1c 8.2% (9/9/24).  CKD stage III    Plan/Recommendations:    - Start Lantus 8 units q 24 hrs at 0900   - Discontinue IV insulin 2 hrs after Lantus is given, ~1100  - Start Novolog Meal Coverage: 1 units per 25 g CHO, TID AC and PRN with snacks/supplements   - Start Novolog Correction Scale: low resistance 1:100 >140 TID AC, 1:100 >200 HS (no 0200)  - BG monitoring: Every 1-2 hours on insulin drip --> then TID AC, HS, 0200  - Hold PTA: metformin and glipizide   - Hypoglycemia protocol    - Carb counting protocol     Discussion: Given patient's age and comorbidies, goal is typically not for tight BG control to prevent risk of hypoglycemia. However, since recent OR with neurosurgery will have goal of <180 mg/dL while inpatient for healing. BG was also running consistently >200 while admitted a few days prior to OR. Received Dexamethasone 4mg in OR 9/9, affect lasts 36-72 hrs. Will therefore conservatively dose insulins today and transition off IV insulin. Once nearing discharge suspect patient can resume PTA regimen (metformin + glipizide) and discontinue insulin. Was taking very low dose of Glipizide, room to increase prior to adding insulin or considering DPP4i at discharge. " Has tried Jardiance before, discontinued due to dizzy spells. Per therapy, likely discharge to rehab.     Discharge Planning: (tentative)    Medications: TBD. Likely resume PTA regimen of metformin 500 mg BID + glipizide 2.5 mg daily (or could increase glipizide). Has meter, keep checking fasting AM daily.   Test Claims: none needed.   Education:  Likely none unless needing insulin at discharge.   [facility]patient Follow[facility]       Thank you for this consult. IDS will continue to follow.      Please notify Inpatient Diabetes Service if changes are planned to steroids, nutrition, TPN/TF and anticipated procedures requiring prolonged NPO status.     Abbie Chavez PA-C  Inpatient Diabetes Service  Pager: [phone]  Available on [employer]    To contact Inpatient Diabetes Service:     7 AM - 5 PM: Page the IDS GERA following the patient that day (see filed or incomplete progress notes/consult notes under Endocrinology)    OR if uncertain of provider assignment: page job code [phone]    5 PM - 7 AM: First call after hours is to primary service.    For urgent after-hours questions, page job code for on call fellow: [phone]     I spent a total of 70 minutes on the date of the encounter doing prep/post-work, chart review, history and exam, documentation and further activities per the note including lab review, multidisciplinary communication, counseling the patient and/or coordinating care regarding acute hyper/hypoglycemic management, as well as discharge management and planning/communication.  See note for details.

## 2024-09-11 NOTE — PLAN OF CARE
Status: S/p Thoracic 7-11 minimally invasive percutaneous posterior instrumented fusion.   Vitals: VSS on RA ex/ HTN within 180 parameters and intermittently tachy 100-110bpm.   Neuros: A&Ox4, needs cuing at times. 4-5/5 throughout with generalized weakness and baseline numbness to bilateral feet   IV: PIV infusing Insulin gtt via algorithm 1   Labs/Electrolytes: WNL   Resp/trach: Continuous pulse ox in place in mid to high 90's  Diet: Regular  Bowel status: LBM 9/10, BS+  : Voiding spontaneously in urinal, strains to void   Skin: Posterior back incision covered with marked primapore, no extension noted   Pain: Back pain managed with scheduled tylenol   Activity: A1/GB/Walker/TLSO when OOB. PCDs on when in bed. Impulsive, BA at all times  Plan/Updates this shift: OT recommending TCU, PT recommending ARU.Subcutaneous heparin to begin on 9/11/24. Continue to monitor and follow POC      Goal Outcome Evaluation:      Plan of Care Reviewed With: patient    Overall Patient Progress: no change    Outcome Evaluation: Remains on insulin gtt. OT recommending TCU, PT recommending ARU.

## 2024-09-11 NOTE — PROGRESS NOTES
[facility], [place]   Neurosurgery Progress Note:    Date of service: 9/11/2024    Assessment: Judah Leon is a 84 year old male  s/p Thoracic 7-11 Minimally Invasive Percutaneous Posterior instrumented fusion with Dr. Salas Guadalupe on 9/9/2024    Clinically Significant Risk Factors Present on Admission                 -Acute Kidney Injury     Plan:  - Neuro checks/vital signs: Q4 hours  - SBP: <160  - Pain control:   PO oxycodone, tylenol   - Activity: up with assist   - Restrictions/Bracing: TLSO when out of bed   - Diet: regular    - Anti-coagulation: none  - DVT prophylaxis: SCDs, subcutaneous heparin   - Imaging:  upright x-rays completed   - PT/OT: pending  - Appreciate Endocrinology assistance regarding blood glucose control  - Delirium precautions       ASH Muñoz, CNP  Department of Neurosurgery  Pager: [phone]         Interval History:  Pain improved this morning.  Worked with therapies who recommend ongoing rehab upon discharge.  Voiding, +BM.  Ongoing intermittent confusion.            Objective:   Temp:  [98.1  F (36.7  C)-98.5  F (36.9  C)] 98.2  F (36.8  C)  Pulse:  [] 92  Resp:  [14-16] 16  BP: (115-173)/(83-88) 173/88  SpO2:  [96 %-98 %] 98 %  I/O last 3 completed shifts:  In: -   Out: 1125 [Urine:1125]    Gen: Appears comfortable, NAD  Wound: Incision, clean, dry, intact without strikethrough  Neurologic:  - Alert & Oriented to person, place, time, and situation  - Follows commands briskly  - Speech fluent, spontaneous. No aphasia or dysarthria.  - No gaze preference. No apparent hemineglect.  - PERRL, EOMI  - Strong eye closure, jaw clench, and cheek puff  - Face symmetric with sensation intact to light touch  - Palate elevates symmetrically, uvula midline, tongue protrudes midline  - Trapezii and sternocleidomastoid muscles 5/5 bilaterally  - No pronator drift     Del Tr Bi WE WF Gr   R 5 5 5 5 5 5   L 5 5 5 5 5 5    HF KE KF DF PF EHL   R  5 5 5 5 5 5   L 5 5 5 5 5 5     Reflexes 2+ throughout    Sensation intact and symmetric to light touch throughout    LABS  Recent Labs   Lab Test 09/11/24  0442 09/10/24  0534 09/09/24  0436   WBC 10.0 9.3 6.8   HGB 11.3* 12.0* 12.8*   MCV 98 99 98   * 141* 153       Recent Labs   Lab Test 09/11/24  0904 09/11/24  0700 09/11/24  0610 09/11/24  0501 09/11/24  0442 09/10/24  0617 09/10/24  0534 09/09/24  0637 09/09/24 0436   NA  --   --   --   --  137  --  136  --  136   POTASSIUM  --   --   --   --  4.4  --  4.8  --  4.7   CHLORIDE  --   --   --   --  105  --  105  --  104   CO2  --   --   --   --  19*  --  16*  --  21*   BUN  --   --   --   --  26.7*  --  31.9*  --  32.3*   CR  --   --   --   --  1.39*  --  1.64*  --  1.47*   ANIONGAP  --   --   --   --  13  --  15  --  11   ROB  --   --   --   --  8.5*  --  8.7*  --  9.2   * 130* 142*   < > 141*   < > 138*   < > 259*    < > = values in this interval not displayed.       IMAGING    Recent Results (from the past 24 hour(s))   XR Surgery ERMELINDA L/T 5 Min Fluoro w Stills    Narrative    This exam was marked as non-reportable because it will not be read by a   radiologist or a [place] non-radiologist provider.

## 2024-09-12 ENCOUNTER — APPOINTMENT (OUTPATIENT)
Dept: PHYSICAL THERAPY | Facility: CLINIC | Age: 84
DRG: 459 | End: 2024-09-12
Attending: NEUROLOGICAL SURGERY
Payer: COMMERCIAL

## 2024-09-12 ENCOUNTER — APPOINTMENT (OUTPATIENT)
Dept: OCCUPATIONAL THERAPY | Facility: CLINIC | Age: 84
DRG: 459 | End: 2024-09-12
Attending: NEUROLOGICAL SURGERY
Payer: COMMERCIAL

## 2024-09-12 LAB
ANION GAP SERPL CALCULATED.3IONS-SCNC: 13 MMOL/L (ref 7–15)
BUN SERPL-MCNC: 26.3 MG/DL (ref 8–23)
CALCIUM SERPL-MCNC: 8.7 MG/DL (ref 8.8–10.4)
CHLORIDE SERPL-SCNC: 106 MMOL/L (ref 98–107)
CREAT SERPL-MCNC: 1.32 MG/DL (ref 0.67–1.17)
EGFRCR SERPLBLD CKD-EPI 2021: 53 ML/MIN/1.73M2
ERYTHROCYTE [DISTWIDTH] IN BLOOD BY AUTOMATED COUNT: 12.6 % (ref 10–15)
GLUCOSE BLDC GLUCOMTR-MCNC: 124 MG/DL (ref 70–99)
GLUCOSE BLDC GLUCOMTR-MCNC: 136 MG/DL (ref 70–99)
GLUCOSE BLDC GLUCOMTR-MCNC: 137 MG/DL (ref 70–99)
GLUCOSE BLDC GLUCOMTR-MCNC: 137 MG/DL (ref 70–99)
GLUCOSE BLDC GLUCOMTR-MCNC: 142 MG/DL (ref 70–99)
GLUCOSE BLDC GLUCOMTR-MCNC: 150 MG/DL (ref 70–99)
GLUCOSE BLDC GLUCOMTR-MCNC: 166 MG/DL (ref 70–99)
GLUCOSE BLDC GLUCOMTR-MCNC: 173 MG/DL (ref 70–99)
GLUCOSE BLDC GLUCOMTR-MCNC: 192 MG/DL (ref 70–99)
GLUCOSE BLDC GLUCOMTR-MCNC: 205 MG/DL (ref 70–99)
GLUCOSE BLDC GLUCOMTR-MCNC: 250 MG/DL (ref 70–99)
GLUCOSE SERPL-MCNC: 156 MG/DL (ref 70–99)
HCO3 SERPL-SCNC: 18 MMOL/L (ref 22–29)
HCT VFR BLD AUTO: 31.8 % (ref 40–53)
HGB BLD-MCNC: 11 G/DL (ref 13.3–17.7)
MAGNESIUM SERPL-MCNC: 1.7 MG/DL (ref 1.7–2.3)
MCH RBC QN AUTO: 32.6 PG (ref 26.5–33)
MCHC RBC AUTO-ENTMCNC: 34.6 G/DL (ref 31.5–36.5)
MCV RBC AUTO: 94 FL (ref 78–100)
PHOSPHATE SERPL-MCNC: 2.5 MG/DL (ref 2.5–4.5)
PLATELET # BLD AUTO: 147 10E3/UL (ref 150–450)
POTASSIUM SERPL-SCNC: 4.3 MMOL/L (ref 3.4–5.3)
RBC # BLD AUTO: 3.37 10E6/UL (ref 4.4–5.9)
SODIUM SERPL-SCNC: 137 MMOL/L (ref 135–145)
WBC # BLD AUTO: 6.9 10E3/UL (ref 4–11)

## 2024-09-12 PROCEDURE — 85027 COMPLETE CBC AUTOMATED: CPT | Performed by: NURSE PRACTITIONER

## 2024-09-12 PROCEDURE — 97530 THERAPEUTIC ACTIVITIES: CPT | Mod: GO

## 2024-09-12 PROCEDURE — 250N000012 HC RX MED GY IP 250 OP 636 PS 637: Performed by: STUDENT IN AN ORGANIZED HEALTH CARE EDUCATION/TRAINING PROGRAM

## 2024-09-12 PROCEDURE — 97530 THERAPEUTIC ACTIVITIES: CPT | Mod: GP | Performed by: PHYSICAL THERAPIST

## 2024-09-12 PROCEDURE — 120N000002 HC R&B MED SURG/OB UMMC

## 2024-09-12 PROCEDURE — 84100 ASSAY OF PHOSPHORUS: CPT | Performed by: NURSE PRACTITIONER

## 2024-09-12 PROCEDURE — 80048 BASIC METABOLIC PNL TOTAL CA: CPT | Performed by: NURSE PRACTITIONER

## 2024-09-12 PROCEDURE — 250N000013 HC RX MED GY IP 250 OP 250 PS 637: Performed by: NURSE PRACTITIONER

## 2024-09-12 PROCEDURE — 99232 SBSQ HOSP IP/OBS MODERATE 35: CPT

## 2024-09-12 PROCEDURE — 97116 GAIT TRAINING THERAPY: CPT | Mod: GP | Performed by: PHYSICAL THERAPIST

## 2024-09-12 PROCEDURE — 36415 COLL VENOUS BLD VENIPUNCTURE: CPT | Performed by: NURSE PRACTITIONER

## 2024-09-12 PROCEDURE — 83735 ASSAY OF MAGNESIUM: CPT | Performed by: NURSE PRACTITIONER

## 2024-09-12 PROCEDURE — 250N000011 HC RX IP 250 OP 636: Performed by: NURSE PRACTITIONER

## 2024-09-12 PROCEDURE — 97535 SELF CARE MNGMENT TRAINING: CPT | Mod: GO

## 2024-09-12 PROCEDURE — 250N000013 HC RX MED GY IP 250 OP 250 PS 637: Performed by: NEUROLOGICAL SURGERY

## 2024-09-12 RX ORDER — GABAPENTIN 300 MG/1
300 CAPSULE ORAL 3 TIMES DAILY
Status: DISCONTINUED | OUTPATIENT
Start: 2024-09-12 | End: 2024-09-21

## 2024-09-12 RX ORDER — MAGNESIUM OXIDE 400 MG/1
400 TABLET ORAL EVERY 4 HOURS
Status: COMPLETED | OUTPATIENT
Start: 2024-09-12 | End: 2024-09-12

## 2024-09-12 RX ORDER — POLYETHYLENE GLYCOL 3350 17 G/17G
17 POWDER, FOR SOLUTION ORAL 2 TIMES DAILY
Status: DISCONTINUED | OUTPATIENT
Start: 2024-09-12 | End: 2024-09-24 | Stop reason: HOSPADM

## 2024-09-12 RX ORDER — SODIUM PHOSPHATE,MONO-DIBASIC 19G-7G/118
1 ENEMA (ML) RECTAL ONCE
Status: COMPLETED | OUTPATIENT
Start: 2024-09-12 | End: 2024-09-13

## 2024-09-12 RX ORDER — AMOXICILLIN 250 MG
2 CAPSULE ORAL 2 TIMES DAILY
Status: DISCONTINUED | OUTPATIENT
Start: 2024-09-12 | End: 2024-09-24 | Stop reason: HOSPADM

## 2024-09-12 RX ORDER — GABAPENTIN 100 MG/1
100 CAPSULE ORAL 3 TIMES DAILY
Status: DISCONTINUED | OUTPATIENT
Start: 2024-09-12 | End: 2024-09-12

## 2024-09-12 RX ADMIN — ACETAMINOPHEN 975 MG: 325 TABLET ORAL at 00:37

## 2024-09-12 RX ADMIN — MAGNESIUM OXIDE TAB 400 MG (241.3 MG ELEMENTAL MG) 400 MG: 400 (241.3 MG) TAB at 07:52

## 2024-09-12 RX ADMIN — HEPARIN SODIUM 5000 UNITS: 5000 INJECTION, SOLUTION INTRAVENOUS; SUBCUTANEOUS at 20:12

## 2024-09-12 RX ADMIN — METHOCARBAMOL 500 MG: 500 TABLET ORAL at 15:38

## 2024-09-12 RX ADMIN — MAGNESIUM HYDROXIDE 30 ML: 400 SUSPENSION ORAL at 15:38

## 2024-09-12 RX ADMIN — ACETAMINOPHEN 975 MG: 325 TABLET ORAL at 10:15

## 2024-09-12 RX ADMIN — HEPARIN SODIUM 5000 UNITS: 5000 INJECTION, SOLUTION INTRAVENOUS; SUBCUTANEOUS at 12:50

## 2024-09-12 RX ADMIN — SIMVASTATIN 40 MG: 40 TABLET, FILM COATED ORAL at 23:23

## 2024-09-12 RX ADMIN — Medication 1 TABLET: at 07:51

## 2024-09-12 RX ADMIN — MAGNESIUM HYDROXIDE 30 ML: 400 SUSPENSION ORAL at 07:56

## 2024-09-12 RX ADMIN — INSULIN ASPART 3 UNITS: 100 INJECTION, SOLUTION INTRAVENOUS; SUBCUTANEOUS at 09:59

## 2024-09-12 RX ADMIN — METHOCARBAMOL 500 MG: 500 TABLET ORAL at 20:12

## 2024-09-12 RX ADMIN — FAMOTIDINE 20 MG: 20 TABLET ORAL at 07:51

## 2024-09-12 RX ADMIN — GABAPENTIN 100 MG: 100 CAPSULE ORAL at 07:51

## 2024-09-12 RX ADMIN — INSULIN GLARGINE 8 UNITS: 100 INJECTION, SOLUTION SUBCUTANEOUS at 09:05

## 2024-09-12 RX ADMIN — POLYETHYLENE GLYCOL 3350 17 G: 17 POWDER, FOR SOLUTION ORAL at 07:50

## 2024-09-12 RX ADMIN — SENNOSIDES AND DOCUSATE SODIUM 2 TABLET: 8.6; 5 TABLET ORAL at 07:52

## 2024-09-12 RX ADMIN — MAGNESIUM OXIDE TAB 400 MG (241.3 MG ELEMENTAL MG) 400 MG: 400 (241.3 MG) TAB at 12:42

## 2024-09-12 RX ADMIN — GABAPENTIN 300 MG: 300 CAPSULE ORAL at 20:12

## 2024-09-12 RX ADMIN — POTASSIUM & SODIUM PHOSPHATES POWDER PACK 280-160-250 MG 1 PACKET: 280-160-250 PACK at 07:52

## 2024-09-12 RX ADMIN — LIDOCAINE 2 PATCH: 560 PATCH PERCUTANEOUS; TOPICAL; TRANSDERMAL at 07:58

## 2024-09-12 RX ADMIN — OXYCODONE HYDROCHLORIDE 5 MG: 5 TABLET ORAL at 09:03

## 2024-09-12 RX ADMIN — Medication 25 MCG: at 07:51

## 2024-09-12 RX ADMIN — INSULIN ASPART 2 UNITS: 100 INJECTION, SOLUTION INTRAVENOUS; SUBCUTANEOUS at 18:24

## 2024-09-12 RX ADMIN — METHOCARBAMOL 500 MG: 500 TABLET ORAL at 07:51

## 2024-09-12 RX ADMIN — POTASSIUM & SODIUM PHOSPHATES POWDER PACK 280-160-250 MG 1 PACKET: 280-160-250 PACK at 12:50

## 2024-09-12 RX ADMIN — GABAPENTIN 300 MG: 300 CAPSULE ORAL at 14:04

## 2024-09-12 RX ADMIN — FAMOTIDINE 20 MG: 20 TABLET ORAL at 20:12

## 2024-09-12 RX ADMIN — HEPARIN SODIUM 5000 UNITS: 5000 INJECTION, SOLUTION INTRAVENOUS; SUBCUTANEOUS at 03:25

## 2024-09-12 RX ADMIN — AMITRIPTYLINE HYDROCHLORIDE 10 MG: 10 TABLET, FILM COATED ORAL at 23:23

## 2024-09-12 RX ADMIN — METHOCARBAMOL 500 MG: 500 TABLET ORAL at 12:50

## 2024-09-12 ASSESSMENT — ACTIVITIES OF DAILY LIVING (ADL)
ADLS_ACUITY_SCORE: 30
ADLS_ACUITY_SCORE: 30
ADLS_ACUITY_SCORE: 34
ADLS_ACUITY_SCORE: 31
ADLS_ACUITY_SCORE: 30
ADLS_ACUITY_SCORE: 31
ADLS_ACUITY_SCORE: 30
ADLS_ACUITY_SCORE: 31
ADLS_ACUITY_SCORE: 30
ADLS_ACUITY_SCORE: 31
ADLS_ACUITY_SCORE: 30
ADLS_ACUITY_SCORE: 30

## 2024-09-12 NOTE — PLAN OF CARE
Status: S/P T7-11 percutaneous Posterior instrumented fusion for unstable T8-9 fracture. Off insulin drip as of 9/11. Hx of CkD, prostate Ca, and DMII.   Vitals: HTN but too low for PRN. HR good. On RA.   Neuros: Aox4. Forgetful. PERRLA. R Bottom eyelid red. Speech intact. 5/5 t/o. Numbness to feet baseline.   IV: SL.   Labs/Electrolytes: ACHS. Mg and phos replaced on shift- redraws in am.   Resp/trach: On RA LSC.   Diet: Reg with carb coverage 1:25. Carb counts.   Bowel status: LBM 9/10. Passing gas. Gave scheduled bowel meds.   : Voids spont to urinal or BR.   Skin: Back incision. Lidocaine x2 on bilat ribs.  Pain: Aching/Punchig pain to back and abdomen poorly controlled with PRN oxy, scheduled Robaxin, and lidocaine patches.   Activity: A1 Gb Walker and TLSO when oob. Sat up in chair. PT walked with Pt.   Social: Tried to dharmesh wife.   Plan: Manage pain. OT- TCU. PT- ARU.   Updates this shift: More aggressive bowel regimen this am. Taya 300 mg TID added.Stopped Scheduled Tylenol.

## 2024-09-12 NOTE — PROGRESS NOTES
"CLINICAL NUTRITION SERVICES    Reviewed nutrition risk factors due to LOS. Pt is tolerating diet, eating well per nursing documentation. No obvious nutrition issues identified at this time. RD will follow per policy at this time, unless consulted.      Nessa Amador RD, LD, [facility]  Neuro ICU Dietitian; 6A Neuro  Vocera \"4E Clinical Dietitian\"  Weekend/holiday \"Weekend Clinical Dietitian\"    "

## 2024-09-12 NOTE — PROGRESS NOTES
[facility], [place]   Neurosurgery Progress Note:    Date of service: 9/12/2024    Assessment: Judah Leon is a 84 year old male  s/p Thoracic 7-11 Minimally Invasive Percutaneous Posterior instrumented fusion with Dr. Salas Guadalupe on 9/9/2024    Clinically Significant Risk Factors Present on Admission                 -Acute Kidney Injury     Plan:  - Neuro checks/vital signs: Q4 hours  - SBP: <160  - Pain control:   PO oxycodone, tylenol   - Activity: up with assist   - Restrictions/Bracing: TLSO when out of bed   - Diet: regular    - Anti-coagulation: none  - DVT prophylaxis: SCDs, subcutaneous heparin   - Imaging:  upright x-rays completed   - PT/OT: ARU  - Appreciate Endocrinology assistance regarding blood glucose control  - Delirium precautions   - Increased bowel regimen      ASH Muñoz, CNP  Department of Neurosurgery  Pager: [phone]         Interval History:  Patient continues to note a burning pain in his left lower abdomen.  Has been up working with therapies, recommending ARU.  No BM or flatus, voiding normally.            Objective:   Temp:  [97.8  F (36.6  C)-98.8  F (37.1  C)] 97.8  F (36.6  C)  Pulse:  [82-92] 82  Resp:  [16-18] 18  BP: (148-179)/(81-90) 171/88  SpO2:  [96 %-99 %] 99 %  I/O last 3 completed shifts:  In: 400 [P.O.:400]  Out: 550 [Urine:550]    Gen: Appears comfortable, NAD  Wound: Incision, clean, dry, intact without strikethrough  Neurologic:  - Alert & Oriented to person, place, time, and situation  - Follows commands briskly  - Speech fluent, spontaneous. No aphasia or dysarthria.  - No gaze preference. No apparent hemineglect.  - PERRL, EOMI  - Strong eye closure, jaw clench, and cheek puff  - Face symmetric with sensation intact to light touch  - Palate elevates symmetrically, uvula midline, tongue protrudes midline  - Trapezii and sternocleidomastoid muscles 5/5 bilaterally  - No pronator drift     Del Tr Bi WE WF Gr   R 5 5 5  5 5 5   L 5 5 5 5 5 5    HF KE KF DF PF EHL   R 5 5 5 5 5 5   L 5 5 5 5 5 5     Reflexes 2+ throughout    Sensation intact and symmetric to light touch throughout    LABS  Recent Labs   Lab Test 09/12/24  0640 09/11/24  0442 09/10/24  0534   WBC 6.9 10.0 9.3   HGB 11.0* 11.3* 12.0*   MCV 94 98 99   * 129* 141*       Recent Labs   Lab Test 09/12/24 0640 09/12/24 0213 09/11/24 2127 09/11/24  0501 09/11/24  0442 09/10/24  0617 09/10/24  0534     --   --   --  137  --  136   POTASSIUM 4.3  --   --   --  4.4  --  4.8   CHLORIDE 106  --   --   --  105  --  105   CO2 18*  --   --   --  19*  --  16*   BUN 26.3*  --   --   --  26.7*  --  31.9*   CR 1.32*  --   --   --  1.39*  --  1.64*   ANIONGAP 13  --   --   --  13  --  15   ROB 8.7*  --   --   --  8.5*  --  8.7*   * 136* 169*   < > 141*   < > 138*    < > = values in this interval not displayed.       IMAGING    Recent Results (from the past 24 hour(s))   XR Surgery ERMELINDA L/T 5 Min Fluoro w Stills    Narrative    This exam was marked as non-reportable because it will not be read by a   radiologist or a [place] non-radiologist provider.

## 2024-09-12 NOTE — PROGRESS NOTES
Inpatient Diabetes Management Service: Daily Progress Note     HPI: Judah Leon is a 84 year old-year-old male with a notable PMHx of stage IIIb chronic kidney disease, diabetes, hypertension, history of prostate cancer, anemia, presenting with unstable T 8-9 fracture. Patient was in his usual state of health and was with his friend on Thursday. Mr. Leon saw his friend start to lose his balance, so he ran to help. Unfortunately, his friend ended up landing on top of the Mr. Leon and fractured his spine at T8-9. He denied loss of consciousness. He denies significant weakness, only endorses sharp pain that begins in his midback and radiates across his abdomen. He denies changes in sensation. He denies incontinence, bowel/bladder symptoms, and saddle anesthesia. [facility] 9/5/24 for surgical management. Now s/p Thoracic 7-11 Minimally Invasive Percutaneous Posterior instrumented fusion with Dr. Salas Guadalupe on 9/9/2024.          Assessment/Plan:     Assessment:   Type 2 Diabetes Mellitus, complicated by NPDR and nephropathy (CKD). Good control for age and comorbidities, A1c 8.2% (9/9/24).  CKD stage III    Plan/Recommendations:   -  Lantus 8 units q 24 hrs at 0900   - Change Novolog Meal Coverage: 1 units per 25 --> 20 starting with supper g CHO, TID AC and PRN with snacks/supplements   - Change Novolog Correction Scale: low resistance 1:100 --> 1:50 >140 TID AC, 1:100 >200 HS, 0200  - BG monitoring: TID AC, HS, 0200  - Hold PTA: metformin and glipizide   - Hypoglycemia protocol    - Carb counting protocol     Discussion:   BG mildly elevated at noon today. Will increase carb coverage slightly. Overall, good glycemic control per age. Will make slight adjustments as above. Discussed with pt that if blood sugars remain at goal we would not need to continue to follow. He is in agreement with this. Will check in on sugars in AM and may sign  off tomorrow given adjustments in doses.     Per ADA guidelines, treatment goals and recommendations for older adults with DM and medically complexity is less stringent BG control/A1c for safety - targets of 110-180 mg/dL and up to 250 mg/dL reasonable.  Avoid reliance on A1c.  Glucose decisions should be based on avoidance of hypoglycemia and symptomatic hyperglycemia.       Discharge Planning: (tentative)  Medications: TBD. Resume PTA regimen of metformin 500 mg BID + glipizide 2.5 mg daily (or could increase glipizide). Has meter, keep checking fasting AM daily.   Test Claims: none needed.   Education:  Likely none unless needing insulin at discharge.   [facility]     Please notify Inpatient Diabetes Service if changes are planned to steroids, nutrition, TPN/TF and anticipated procedures requiring prolonged NPO status.         Interval History/Review of Systems :   The last 24 hours progress and nursing notes reviewed.  - No acute events overnight.   - Endorses significant lower abdominal pain. Last BM 9/10. Denies nausea.     Planned Procedures/Surgeries: none    Inpatient Glucose Control:       Recent Labs   Lab 09/12/24  0640 09/12/24  0213 09/11/24  2127 09/11/24  1810 09/11/24  1251 09/11/24  1003   * 136* 169* 170* 192* 142*             Medications Impacting Glycemia:   Steroids: none currently. Dexamethasone 4mg in OR 9/9  D5W containing solutions/medications: none  Other medications impacting glucose: none        Nutrition:   Orders Placed This Encounter      Advance Diet as Tolerated: Regular Diet Adult    Supplements:  none  TF: none  TPN: none         Diabetes History: see full consult note for complete diabetes history   Diabetes Type and Duration: T2DM x ~ 15-20 years      PTA Medication Regimen: --> patient unsure of exact doses, doses per chart review.                Metformin 500 mg BID                Glipizide 2.5 mg daily     Historical Diabetes  "Medications:                 Per PCP visit 8/12/24, recently tried Jardiacne and stopped 2/2 dizzy spells      Glucose monitoring device and frequency: meter, checks fasting glucose daily. Reports readings usually 100-200s.      Outpatient Diabetes Provider: PCP - Willy Obregon MD (last visit 8/12/2024) [facility]      Formal Diabetes Education/Educator: yes, [facility].        Physical Exam:   BP (!) 171/88   Pulse 82   Temp 97.8  F (36.6  C) (Oral)   Resp 18   Ht 1.702 m (5' 7.01\")   Wt 74.2 kg (163 lb 9.3 oz)   SpO2 99%   BMI 25.61 kg/m    Constitutional: Well-appearing patient in no acute distress.  Eyes: sclera anicteric.  Respiratory: No signs of labored breathing.           Data:     Lab Results   Component Value Date    A1C 8.2 (H) 09/09/2024       ROUTINE IP LABS (Last four results)  BMP  Recent Labs   Lab 09/12/24  0640 09/12/24 0213 09/11/24 2127 09/11/24  1810 09/11/24  0501 09/11/24  0442 09/10/24  0617 09/10/24  0534 09/09/24  0637 09/09/24  0436     --   --   --   --  137  --  136  --  136   POTASSIUM 4.3  --   --   --   --  4.4  --  4.8  --  4.7   CHLORIDE 106  --   --   --   --  105  --  105  --  104   ROB 8.7*  --   --   --   --  8.5*  --  8.7*  --  9.2   CO2 18*  --   --   --   --  19*  --  16*  --  21*   BUN 26.3*  --   --   --   --  26.7*  --  31.9*  --  32.3*   CR 1.32*  --   --   --   --  1.39*  --  1.64*  --  1.47*   * 136* 169* 170*   < > 141*   < > 138*   < > 259*    < > = values in this interval not displayed.     CBC  Recent Labs   Lab 09/12/24 0640 09/11/24  0442 09/10/24  0534 09/09/24  0436   WBC 6.9 10.0 9.3 6.8   RBC 3.37* 3.44* 3.64* 3.88*   HGB 11.0* 11.3* 12.0* 12.8*   HCT 31.8* 33.6* 36.1* 38.1*   MCV 94 98 99 98   MCH 32.6 32.8 33.0 33.0   MCHC 34.6 33.6 33.2 33.6   RDW 12.6 12.6 12.6 12.6   * 129* 141* 153     INR  Recent Labs   Lab 09/08/24  1046 09/05/24  2100   INR 1.10 1.02       Inpatient Diabetes Service will continue to follow, " please don't hesitate to contact the team with any questions or concerns.     Irina Merida PA-C  Inpatient Diabetes Service  Available on [email or web address]      Plan discussed with patient, bedside RN, and primary team via this note.    To contact Inpatient Diabetes Service:     7 AM - 5 PM: Page the IDS GERA following the patient that day (see filed or incomplete progress notes/consult notes under Endocrinology)    OR if uncertain of provider assignment: page job code [phone]    5 PM - 7 AM: First call after hours is to primary service.    For urgent after-hours questions, page job code for on call fellow: [phone]     I spent a total of 40 minutes on the date of the encounter doing prep/post-work, chart review, history and exam, documentation and further activities per the note including lab review, multidisciplinary communication, counseling the patient and/or coordinating care regarding acute hyper/hypoglycemic management, as well as discharge management and planning/communication.

## 2024-09-12 NOTE — PLAN OF CARE
Status: S/p Thoracic 7-11 minimally invasive percutaneous posterior instrumented fusion  Vitals: VSS on RA ex/ HTN within 180 parameters and intermittently tachy 100-110bpm  Neuros: A&Ox4, needs cuing at times. 5/5 throughout with generalized weakness and baseline numbness to bilateral feet. Forgetful   IV: PIV SL   Labs/Electrolytes: WNL, encourage water intake due to creatinine   Resp/trach:WNL  Diet: Regular  Bowel status: LBM 9/10, BS+  : Voiding spontaneously in urinal, strains to void   Skin: Posterior back incision covered with primapore, new dressing applied by MD  Pain: Back pain managed with scheduled tylenol and robaxin. Oxycodone given for back pain radiating to stomach cause spasms  Activity: A1/GB/Walker/TLSO when OOB. PCDs on when in bed. Impulsive, BA at all times  Plan/Updates this shift: OT recommending TCU, PT recommending ARU.Subcutaneous heparin started today. Lantus started and insulin gtt discontinued. Continue to monitor and follow POC

## 2024-09-12 NOTE — PLAN OF CARE
"Goal Outcome Evaluation:      Plan of Care Reviewed With: patient    Overall Patient Progress: improvingOverall Patient Progress: improving     BP (!) 170/86   Pulse 84   Temp 98.8  F (37.1  C) (Oral)   Resp 16   Ht 1.702 m (5' 7.01\")   Wt 74.2 kg (163 lb 9.3 oz)   SpO2 98%   BMI 25.61 kg/m      Neuro: A&Ox4. Denied N/T. Forgetful. Bed alarm on-intermittent call light use  Cardiac: Afebrile, HTN (170's)- below parameters for PRN meds   Respiratory: RA   GI/: Voiding spontaneously. No BM this shift. Last BM 9/10  Diet/appetite: Tolerating diet. Denies nausea   Endocrine: ac/hs BG chedcks with ssi coverage orders in place. 0200 BG - 136  Activity: Up with assist of 1/walker/gb. TLSO when oob   Pain: pain maintained at tolerable level with scheduled tylenol. Declined prn meds  Skin: back incision dressing CDI. Sacral mepilex in place  Lines: piv sl'd  Replacement: RN managed replacement protocols in place. Awaiting am labs for review    Rested btwn cares. Able to make needs known. Frequent rounding performed. Continue tiny monitor. Notify MD of changes/concerns        "

## 2024-09-13 ENCOUNTER — APPOINTMENT (OUTPATIENT)
Dept: GENERAL RADIOLOGY | Facility: CLINIC | Age: 84
DRG: 459 | End: 2024-09-13
Attending: NURSE PRACTITIONER
Payer: COMMERCIAL

## 2024-09-13 ENCOUNTER — APPOINTMENT (OUTPATIENT)
Dept: PHYSICAL THERAPY | Facility: CLINIC | Age: 84
DRG: 459 | End: 2024-09-13
Attending: NEUROLOGICAL SURGERY
Payer: COMMERCIAL

## 2024-09-13 LAB
ANION GAP SERPL CALCULATED.3IONS-SCNC: 11 MMOL/L (ref 7–15)
BUN SERPL-MCNC: 27.9 MG/DL (ref 8–23)
CALCIUM SERPL-MCNC: 8.5 MG/DL (ref 8.8–10.4)
CHLORIDE SERPL-SCNC: 104 MMOL/L (ref 98–107)
CREAT SERPL-MCNC: 1.44 MG/DL (ref 0.67–1.17)
EGFRCR SERPLBLD CKD-EPI 2021: 47 ML/MIN/1.73M2
ERYTHROCYTE [DISTWIDTH] IN BLOOD BY AUTOMATED COUNT: 12.7 % (ref 10–15)
GLUCOSE BLDC GLUCOMTR-MCNC: 163 MG/DL (ref 70–99)
GLUCOSE BLDC GLUCOMTR-MCNC: 195 MG/DL (ref 70–99)
GLUCOSE BLDC GLUCOMTR-MCNC: 209 MG/DL (ref 70–99)
GLUCOSE BLDC GLUCOMTR-MCNC: 226 MG/DL (ref 70–99)
GLUCOSE BLDC GLUCOMTR-MCNC: 258 MG/DL (ref 70–99)
GLUCOSE SERPL-MCNC: 186 MG/DL (ref 70–99)
HCO3 SERPL-SCNC: 21 MMOL/L (ref 22–29)
HCT VFR BLD AUTO: 30.2 % (ref 40–53)
HGB BLD-MCNC: 10.6 G/DL (ref 13.3–17.7)
MAGNESIUM SERPL-MCNC: 2 MG/DL (ref 1.7–2.3)
MCH RBC QN AUTO: 32.8 PG (ref 26.5–33)
MCHC RBC AUTO-ENTMCNC: 35.1 G/DL (ref 31.5–36.5)
MCV RBC AUTO: 94 FL (ref 78–100)
PHOSPHATE SERPL-MCNC: 2.3 MG/DL (ref 2.5–4.5)
PLATELET # BLD AUTO: 158 10E3/UL (ref 150–450)
POTASSIUM SERPL-SCNC: 4.3 MMOL/L (ref 3.4–5.3)
RBC # BLD AUTO: 3.23 10E6/UL (ref 4.4–5.9)
SODIUM SERPL-SCNC: 136 MMOL/L (ref 135–145)
WBC # BLD AUTO: 5.8 10E3/UL (ref 4–11)

## 2024-09-13 PROCEDURE — 250N000013 HC RX MED GY IP 250 OP 250 PS 637: Performed by: NEUROLOGICAL SURGERY

## 2024-09-13 PROCEDURE — 250N000013 HC RX MED GY IP 250 OP 250 PS 637: Performed by: NURSE PRACTITIONER

## 2024-09-13 PROCEDURE — 120N000002 HC R&B MED SURG/OB UMMC

## 2024-09-13 PROCEDURE — 36415 COLL VENOUS BLD VENIPUNCTURE: CPT | Performed by: NURSE PRACTITIONER

## 2024-09-13 PROCEDURE — 97530 THERAPEUTIC ACTIVITIES: CPT | Mod: GP

## 2024-09-13 PROCEDURE — 80048 BASIC METABOLIC PNL TOTAL CA: CPT | Performed by: NURSE PRACTITIONER

## 2024-09-13 PROCEDURE — 250N000011 HC RX IP 250 OP 636: Performed by: NURSE PRACTITIONER

## 2024-09-13 PROCEDURE — 71046 X-RAY EXAM CHEST 2 VIEWS: CPT

## 2024-09-13 PROCEDURE — 83735 ASSAY OF MAGNESIUM: CPT | Performed by: NURSE PRACTITIONER

## 2024-09-13 PROCEDURE — 99232 SBSQ HOSP IP/OBS MODERATE 35: CPT

## 2024-09-13 PROCEDURE — 85027 COMPLETE CBC AUTOMATED: CPT | Performed by: NURSE PRACTITIONER

## 2024-09-13 PROCEDURE — 84100 ASSAY OF PHOSPHORUS: CPT | Performed by: NURSE PRACTITIONER

## 2024-09-13 PROCEDURE — 97116 GAIT TRAINING THERAPY: CPT | Mod: GP

## 2024-09-13 PROCEDURE — 71046 X-RAY EXAM CHEST 2 VIEWS: CPT | Mod: 26 | Performed by: RADIOLOGY

## 2024-09-13 RX ORDER — POLYETHYLENE GLYCOL 3350 17 G/17G
17 POWDER, FOR SOLUTION ORAL 2 TIMES DAILY
Status: ON HOLD | DISCHARGE
Start: 2024-09-13 | End: 2024-09-30

## 2024-09-13 RX ORDER — MAGNESIUM OXIDE 400 MG/1
400 TABLET ORAL EVERY 4 HOURS
Status: COMPLETED | OUTPATIENT
Start: 2024-09-13 | End: 2024-09-13

## 2024-09-13 RX ORDER — DIAZEPAM 5 MG
5 TABLET ORAL EVERY 6 HOURS PRN
Status: SHIPPED | DISCHARGE
Start: 2024-09-13 | End: 2024-09-19

## 2024-09-13 RX ORDER — METHOCARBAMOL 500 MG/1
500 TABLET, FILM COATED ORAL 4 TIMES DAILY PRN
Status: ON HOLD | DISCHARGE
Start: 2024-09-13 | End: 2024-09-30

## 2024-09-13 RX ORDER — AMOXICILLIN 250 MG
2 CAPSULE ORAL 2 TIMES DAILY
Status: ON HOLD | DISCHARGE
Start: 2024-09-13 | End: 2024-09-30

## 2024-09-13 RX ORDER — OXYCODONE HYDROCHLORIDE 5 MG/1
5 TABLET ORAL EVERY 6 HOURS PRN
Qty: 20 TABLET | Refills: 0 | Status: ON HOLD | OUTPATIENT
Start: 2024-09-13 | End: 2024-09-30

## 2024-09-13 RX ORDER — ACETAMINOPHEN 325 MG/1
650 TABLET ORAL EVERY 4 HOURS PRN
DISCHARGE
Start: 2024-09-13

## 2024-09-13 RX ORDER — GABAPENTIN 300 MG/1
300 CAPSULE ORAL 3 TIMES DAILY
DISCHARGE
Start: 2024-09-13 | End: 2024-09-24

## 2024-09-13 RX ADMIN — POLYETHYLENE GLYCOL 3350 17 G: 17 POWDER, FOR SOLUTION ORAL at 20:39

## 2024-09-13 RX ADMIN — METHOCARBAMOL 500 MG: 500 TABLET ORAL at 08:36

## 2024-09-13 RX ADMIN — SENNOSIDES AND DOCUSATE SODIUM 2 TABLET: 8.6; 5 TABLET ORAL at 20:39

## 2024-09-13 RX ADMIN — HEPARIN SODIUM 5000 UNITS: 5000 INJECTION, SOLUTION INTRAVENOUS; SUBCUTANEOUS at 12:08

## 2024-09-13 RX ADMIN — POTASSIUM & SODIUM PHOSPHATES POWDER PACK 280-160-250 MG 1 PACKET: 280-160-250 PACK at 09:42

## 2024-09-13 RX ADMIN — INSULIN ASPART 3 UNITS: 100 INJECTION, SOLUTION INTRAVENOUS; SUBCUTANEOUS at 20:43

## 2024-09-13 RX ADMIN — GABAPENTIN 300 MG: 300 CAPSULE ORAL at 20:38

## 2024-09-13 RX ADMIN — METHOCARBAMOL 500 MG: 500 TABLET ORAL at 15:58

## 2024-09-13 RX ADMIN — GABAPENTIN 300 MG: 300 CAPSULE ORAL at 13:57

## 2024-09-13 RX ADMIN — LIDOCAINE 2 PATCH: 560 PATCH PERCUTANEOUS; TOPICAL; TRANSDERMAL at 08:36

## 2024-09-13 RX ADMIN — FAMOTIDINE 20 MG: 20 TABLET ORAL at 08:35

## 2024-09-13 RX ADMIN — METHOCARBAMOL 500 MG: 500 TABLET ORAL at 12:08

## 2024-09-13 RX ADMIN — HEPARIN SODIUM 5000 UNITS: 5000 INJECTION, SOLUTION INTRAVENOUS; SUBCUTANEOUS at 20:46

## 2024-09-13 RX ADMIN — HEPARIN SODIUM 5000 UNITS: 5000 INJECTION, SOLUTION INTRAVENOUS; SUBCUTANEOUS at 04:47

## 2024-09-13 RX ADMIN — POLYETHYLENE GLYCOL 3350 17 G: 17 POWDER, FOR SOLUTION ORAL at 20:36

## 2024-09-13 RX ADMIN — MAGNESIUM OXIDE TAB 400 MG (241.3 MG ELEMENTAL MG) 400 MG: 400 (241.3 MG) TAB at 12:08

## 2024-09-13 RX ADMIN — POTASSIUM & SODIUM PHOSPHATES POWDER PACK 280-160-250 MG 1 PACKET: 280-160-250 PACK at 12:08

## 2024-09-13 RX ADMIN — MAGNESIUM OXIDE TAB 400 MG (241.3 MG ELEMENTAL MG) 400 MG: 400 (241.3 MG) TAB at 08:35

## 2024-09-13 RX ADMIN — Medication 1 TABLET: at 08:35

## 2024-09-13 RX ADMIN — FAMOTIDINE 20 MG: 20 TABLET ORAL at 20:39

## 2024-09-13 RX ADMIN — METHOCARBAMOL 500 MG: 500 TABLET ORAL at 20:38

## 2024-09-13 RX ADMIN — GABAPENTIN 300 MG: 300 CAPSULE ORAL at 08:35

## 2024-09-13 RX ADMIN — AMITRIPTYLINE HYDROCHLORIDE 10 MG: 10 TABLET, FILM COATED ORAL at 22:02

## 2024-09-13 RX ADMIN — POTASSIUM & SODIUM PHOSPHATES POWDER PACK 280-160-250 MG 1 PACKET: 280-160-250 PACK at 16:00

## 2024-09-13 RX ADMIN — SIMVASTATIN 40 MG: 40 TABLET, FILM COATED ORAL at 22:02

## 2024-09-13 RX ADMIN — Medication 25 MCG: at 08:35

## 2024-09-13 RX ADMIN — INSULIN GLARGINE 8 UNITS: 100 INJECTION, SOLUTION SUBCUTANEOUS at 08:36

## 2024-09-13 ASSESSMENT — ACTIVITIES OF DAILY LIVING (ADL)
ADLS_ACUITY_SCORE: 30

## 2024-09-13 NOTE — PLAN OF CARE
Goal Outcome Evaluation:      Plan of Care Reviewed With: patient, spouse    Overall Patient Progress: improvingOverall Patient Progress: improving    Outcome Evaluation: Acute rehab placement is recommended.  Patient and wife voice understanding of the recommended discharge plan and agreement with the recommended discharge plan.    NANI Taveras  Social Work, 6A  Phone:  [phone]  Pager:  [phone]  9/13/2024

## 2024-09-13 NOTE — PROGRESS NOTES
"[facility], [place]   Neurosurgery Progress Note:    Date of service: 9/13/2024    Assessment: Judah Leon is a 84 year old male  s/p Thoracic 7-11 Minimally Invasive Percutaneous Posterior instrumented fusion with Dr. Salas Guadalupe on 9/9/2024    Clinically Significant Risk Factors        # DMII: A1C = 8.2 % (Ref range: <5.7 %) within past 6 months   # Overweight: Estimated body mass index is 25.61 kg/m  as calculated from the following:    Height as of this encounter: 1.702 m (5' 7.01\").    Weight as of this encounter: 74.2 kg (163 lb 9.3 oz).       # Acute Kidney Injury, resolved   # CKD stage 3  # Metabolic encephalopathy, resolved     Plan:  - Neuro checks/vital signs: Q4 hours  - Pain control: PO oxycodone, tylenol, robaxin and gabapentin   - Activity: up with assist   - Restrictions/Bracing: TLSO when out of bed   - Diet: regular   - DVT prophylaxis: SCDs, subcutaneous heparin   - PT/OT: ARU  - Appreciate Endocrinology assistance regarding blood glucose control  - Bowel regimen    Wound Cares:  Please cleanse wound daily with betadine and replace dressing daily.  Please keep wound covered at all times except when changing dressing until you are seen in clinic by Dr. Salas Guadalupe.  It is ok to remove and replaced soiled dressings.  Please continue bed baths and do not shower until follow-up in clinic with Dr. Guadalupe.    ASH Gray, CNP  Neurosurgery  Pager [phone]      Interval History:  Patient reports back pain is controlled.  Patient reports rib pain, gabapentin increased yesterday evening. CXR today    Objective:   Temp:  [97.6  F (36.4  C)-98.4  F (36.9  C)] 97.6  F (36.4  C)  Pulse:  [81] 81  Resp:  [16-18] 18  BP: (164-174)/(83-91) 169/91  Cuff Mean (mmHg):  [115] 115  SpO2:  [97 %-100 %] 98 %  I/O last 3 completed shifts:  In: 1840 [P.O.:1840]  Out: 710 [Urine:710]    Gen: Appears comfortable, NAD  Wound: Incision, clean, dry, intact without strikethrough  Neurologic:  - " Alert & Oriented to person, place, time, and situation  - Follows commands briskly  - Speech fluent, spontaneous. No aphasia or dysarthria.  - No gaze preference. No apparent hemineglect.  - PERRL, EOMI  - Strong eye closure, jaw clench, and cheek puff  - Face symmetric with sensation intact to light touch  - Palate elevates symmetrically, uvula midline, tongue protrudes midline  - Trapezii and sternocleidomastoid muscles 5/5 bilaterally  - No pronator drift     Del Tr Bi WE WF Gr   R 5 5 5 5 5 5   L 5 5 5 5 5 5    HF KE KF DF PF EHL   R 5 5 5 5 5 5   L 5 5 5 5 5 5     Reflexes 2+ throughout    Sensation intact and symmetric to light touch throughout    LABS  Recent Labs   Lab Test 09/13/24  0611 09/12/24  0640 09/11/24  0442   WBC 5.8 6.9 10.0   HGB 10.6* 11.0* 11.3*   MCV 94 94 98    147* 129*       Recent Labs   Lab Test 09/13/24  0611 09/13/24  0237 09/12/24  2322 09/12/24  0955 09/12/24  0640 09/11/24  0501 09/11/24  0442     --   --   --  137  --  137   POTASSIUM 4.3  --   --   --  4.3  --  4.4   CHLORIDE 104  --   --   --  106  --  105   CO2 21*  --   --   --  18*  --  19*   BUN 27.9*  --   --   --  26.3*  --  26.7*   CR 1.44*  --   --   --  1.32*  --  1.39*   ANIONGAP 11  --   --   --  13  --  13   ROB 8.5*  --   --   --  8.7*  --  8.5*   * 163* 250*   < > 156*   < > 141*    < > = values in this interval not displayed.

## 2024-09-13 NOTE — PLAN OF CARE
Goal Outcome Evaluation:      Plan of Care Reviewed With: patient    Overall Patient Progress: improvingOverall Patient Progress: improving    Outcome Evaluation: Patient medically ready for discharge to ARU, pending placement.    Status: S/P T7-11 percutaneous Posterior instrumented fusion for unstable T8-9 fracture. Off insulin drip as of 9/11. Hx of CkD, prostate Ca, and DMII.   Vitals: WNL on RA   Neuros: Aox4. Forgetful. PERRLA. R bottom eyelid red. Speech intact. 5/5 t/o. Numbness to feet baseline.  IV: PIV SL.   Labs/Electrolytes: ACHS.  Resp/trach: On RA LSC.   Diet: Reg with carb counts.   Bowel status: LBM 9/12. Passing gas.   : Voids spont to urinal or BR.   Skin: primapore over incision CDI  Pain: pain controlled this shift with robaxin. Declined Tylenol.  Activity: Up with 1/GB/Walker and TLSO when oob. Sat up in chair.  Social: Son came to visit this evening.   Plan: Awaiting ARU placement.

## 2024-09-13 NOTE — PLAN OF CARE
Status: S/P T7-11 percutaneous Posterior instrumented fusion for unstable T8-9 fracture. Off insulin drip as of 9/11. Hx of CkD, prostate Ca, and DMII.   Vitals: HTN without prns or parms. HR good. On Ra.   Neuros: Aox4. Forgetful. PERRLA. R Bottom eyelid red. Speech intact. 5/5 t/o. Numbness to feet baseline. 1x episode of vertigo on shift that resolved on own.   IV: SL.   Labs/Electrolytes: ACHS. Mg and phos replaced on shift- redraws in am. Xray chest 2 view done on shift with no abnormal findings.   Resp/trach: On RA LSC.   Diet: Reg with carb coverage 1:20. Carb counts.   Bowel status: LBM 9/12. Passing gas. Declined scheduled bowel meds.   : Voids spont to urinal or BR.   Skin: Back incision- change by writer. Lidocaine x2  to bilat back.  Pain: Aching/Punchig pain to back and abdomen well controlled with schedule meds and lidocaine patches.   Activity: A1 Gb Walker and TLSO when oob. Sat up in chair. Went for walk x1 with PT.   Social: Tried to dharmesh wife.   Plan: Manage pain. OT- TCU. PT- ARU. Sw following for placement.

## 2024-09-13 NOTE — PROGRESS NOTES
Inpatient Diabetes Management Service: Daily Progress Note     HPI: Judah Leon is a 84 year old-year-old male with a notable PMHx of stage IIIb chronic kidney disease, diabetes, hypertension, history of prostate cancer, anemia, presenting with unstable T 8-9 fracture. Patient was in his usual state of health and was with his friend on Thursday. Mr. Leon saw his friend start to lose his balance, so he ran to help. Unfortunately, his friend ended up landing on top of the Mr. Leon and fractured his spine at T8-9. He denied loss of consciousness. He denies significant weakness, only endorses sharp pain that begins in his midback and radiates across his abdomen. He denies changes in sensation. He denies incontinence, bowel/bladder symptoms, and saddle anesthesia. [facility] 9/5/24 for surgical management. Now s/p Thoracic 7-11 Minimally Invasive Percutaneous Posterior instrumented fusion with Dr. Salas Guadalupe on 9/9/2024.          Assessment/Plan:     Assessment:   Type 2 Diabetes Mellitus, complicated by NPDR and nephropathy (CKD). Good control for age and comorbidities, A1c 8.2% (9/9/24).  CKD stage III    Plan/Recommendations:   -  Lantus 8 units q 24 hrs at 0900   -  Novolog Meal Coverage: 1 units per 20 g CHO, TID AC and PRN with snacks/supplements   - Novolog Correction Scale: 1:50 >140 TID AC (medium resistance), 1:100 >200 HS, 0200 (low resistance)  - BG monitoring: TID AC, HS, 0200  - Hold PTA: metformin and glipizide   - Hypoglycemia protocol    - Carb counting protocol     Inpatient Diabetes Service Signing off 9/13/24  Please call back with any questions or if BG become more labile or if getting closer to discharge and assistance is needed for home recommendations.    Please allow at least 24 hours for home recs if they are not provided.     Discussion:   Reasonable glycemic control given age.  Plan for him to [facility] Given good glycemic control we discussed IDS signing off. He and primary team are in agreement with this.     Per ADA guidelines, treatment goals and recommendations for older adults with DM and medically complexity is less stringent BG control/A1c for safety - targets of 110-180 mg/dL and up to 250 mg/dL reasonable.  Avoid reliance on A1c.  Glucose decisions should be based on avoidance of hypoglycemia and symptomatic hyperglycemia.       Discharge Planning:   Medications: Resume PTA regimen of metformin 500 mg BID + glipizide 2.5 mg daily. Has meter, keep checking fasting AM daily.   Test Claims: none needed.   Education:  Likely none unless needing insulin at discharge.   [facility]     Please notify Inpatient Diabetes Service if changes are planned to steroids, nutrition, TPN/TF and anticipated procedures requiring prolonged NPO status.         Interval History/Review of Systems :   The last 24 hours progress and nursing notes reviewed.  - No acute events overnight.   - Denies nausea or vomiting.  - Endorses some abdominal pain controlled with lidocaine patches.     Planned Procedures/Surgeries: none    Inpatient Glucose Control:       Recent Labs   Lab 09/13/24  0611 09/13/24  0237 09/12/24  2322 09/12/24  1741 09/12/24  1258 09/12/24  0955   * 163* 250* 150* 205* 173*             Medications Impacting Glycemia:   Steroids: none currently. Dexamethasone 4mg in OR 9/9  D5W containing solutions/medications: none  Other medications impacting glucose: none        Nutrition:   Orders Placed This Encounter      Advance Diet as Tolerated: Regular Diet Adult    Supplements:  none  TF: none  TPN: none         Diabetes History: see full consult note for complete diabetes history   Diabetes Type and Duration: T2DM x ~ 15-20 years      PTA Medication Regimen: --> patient unsure of exact doses, doses per chart review.                Metformin 500 mg BID                 "Glipizide 2.5 mg daily     Historical Diabetes Medications:                 Per PCP visit 8/12/24, recently tried Jardiacne and stopped 2/2 dizzy spells      Glucose monitoring device and frequency: meter, checks fasting glucose daily. Reports readings usually 100-200s.      Outpatient Diabetes Provider: PCP - Willy Obregon MD (last visit 8/12/2024) [facility]      Formal Diabetes Education/Educator: yes, [facility].        Physical Exam:   BP (!) 169/91 (BP Location: Left arm)   Pulse 81   Temp 97.6  F (36.4  C) (Oral)   Resp 18   Ht 1.702 m (5' 7.01\")   Wt 74.2 kg (163 lb 9.3 oz)   SpO2 98%   BMI 25.61 kg/m    Constitutional: Well-appearing patient in no acute distress.  Eyes: sclera anicteric.  Respiratory: No signs of labored breathing.            Data:     Lab Results   Component Value Date    A1C 8.2 (H) 09/09/2024       ROUTINE IP LABS (Last four results)  BMP  Recent Labs   Lab 09/13/24  0611 09/13/24  0237 09/12/24  2322 09/12/24  1741 09/12/24  0955 09/12/24  0640 09/11/24  0501 09/11/24  0442 09/10/24  0617 09/10/24  0534     --   --   --   --  137  --  137  --  136   POTASSIUM 4.3  --   --   --   --  4.3  --  4.4  --  4.8   CHLORIDE 104  --   --   --   --  106  --  105  --  105   ROB 8.5*  --   --   --   --  8.7*  --  8.5*  --  8.7*   CO2 21*  --   --   --   --  18*  --  19*  --  16*   BUN 27.9*  --   --   --   --  26.3*  --  26.7*  --  31.9*   CR 1.44*  --   --   --   --  1.32*  --  1.39*  --  1.64*   * 163* 250* 150*   < > 156*   < > 141*   < > 138*    < > = values in this interval not displayed.     CBC  Recent Labs   Lab 09/13/24  0611 09/12/24  0640 09/11/24  0442 09/10/24  0534   WBC 5.8 6.9 10.0 9.3   RBC 3.23* 3.37* 3.44* 3.64*   HGB 10.6* 11.0* 11.3* 12.0*   HCT 30.2* 31.8* 33.6* 36.1*   MCV 94 94 98 99   MCH 32.8 32.6 32.8 33.0   MCHC 35.1 34.6 33.6 33.2   RDW 12.7 12.6 12.6 12.6    147* 129* 141*     INR  Recent Labs   Lab 09/08/24  1046   INR 1.10 "       Inpatient Diabetes Service Signing off 9/13/24  Please call back with any questions or if BG become more labile or if getting closer to discharge and assistance is needed for home recommendations.    Please allow at least 24 hours for home recs if they are not provided.     Irina Merida PA-C  Inpatient Diabetes Service  Available on [employer]      Plan discussed with patient, bedside RN, and primary team via this note.    To contact Inpatient Diabetes Service:     7 AM - 5 PM: Page the IDS GERA following the patient that day (see filed or incomplete progress notes/consult notes under Endocrinology)    OR if uncertain of provider assignment: page job code [phone]    5 PM - 7 AM: First call after hours is to primary service.    For urgent after-hours questions, page job code for on call fellow: [phone]     I spent a total of 40 minutes on the date of the encounter doing prep/post-work, chart review, history and exam, documentation and further activities per the note including lab review, multidisciplinary communication, counseling the patient and/or coordinating care regarding acute hyper/hypoglycemic management, as well as discharge management and planning/communication.

## 2024-09-13 NOTE — PLAN OF CARE
Status: S/P T7-11 percutaneous Posterior instrumented fusion for unstable T8-9 fracture. Hx of CkD, prostate Ca, and DMII.   Vitals: HTN within parameters. Keep SBP <180.  Neuros: Neuro intact ex forgetful at times + N/T to BLE  IV: PIV SL  Labs/Electrolytes: BG AC/HS  Resp/trach: RA. LS clear   Diet: Regular diet with carb coverage  Bowel status: BM x2 9/12   : Voiding spont  Skin: back incision with primapore intact  Pain: pain managed with scheduled meds  Activity: Up with 1 GB walker + TLSO when oob. Walk x1. Up in chair for dinner.   Social: Wife and son at bedside  Plan: Continue with POC. Plan for ARU

## 2024-09-13 NOTE — PROGRESS NOTES
Care Management Initial Consult    General Information  Assessment completed with:  (Patient and wife (Yolette)), Patient and wife (Yolette)  Type of CM/SW Visit: Initial Assessment    Primary Care Provider verified and updated as needed: Yes (Dr. Obregon at [facility] in [place])   Readmission within the last 30 days: no previous admission in last 30 days      Reason for Consult:  (Elevated risk score and discharge planning)  Advance Care Planning:    Patient does not have a health care directive       Communication Assessment  Patient's communication style: spoken language (English or Bilingual)    Hearing Difficulty or Deaf: yes   Wear Glasses or Blind: yes    Cognitive  Cognitive/Neuro/Behavioral: WDL  Level of Consciousness: alert  Arousal Level: opens eyes spontaneously  Orientation: oriented x 4  Mood/Behavior: calm, cooperative  Best Language: 0 - No aphasia  Speech: clear, logical, spontaneous    Living Environment:   People in home:  (Patient lives with his wife (Yolette))     Current living Arrangements:  (Side by Side Duplex)      Able to return to prior arrangements: yes       Family/Social Support:  Care provided by: self  Provides care for: no one  Marital Status:   Support system: Wife, Children  Yolette       Description of Support System: Supportive    Support Assessment: Adequate family and caregiver support    Current Resources:   Patient receiving home care services: No        Community Resources: None  Equipment currently used at home:  (Pt owns a cane and has a grab bar by the bathtub)  Supplies currently used at home:      Employment/Financial:  Employment Status: retired     Employment/ Comments: Patient states that he served 1.5 years in the [employer]  Financial Concerns:  (None stated)   Referral to Financial Worker: No       Does the patient's insurance plan have a 3 day qualifying hospital stay waiver?  No    Lifestyle & Psychosocial Needs:  Social Determinants of Health      Food Insecurity: Low Risk  (9/6/2024)    Food Insecurity     Within the past 12 months, did you worry that your food would run out before you got money to buy more?: No     Within the past 12 months, did the food you bought just not last and you didn t have money to get more?: No   Depression: Not at risk (2/15/2024)    Received from [facility]    PHQ-2     PHQ-2 Score: 1   Housing Stability: Low Risk  (9/6/2024)    Housing Stability     Do you have housing? : Yes     Are you worried about losing your housing?: No   Tobacco Use: Low Risk  (8/12/2024)    Received from [facility]    Patient History     Smoking Tobacco Use: Never     Smokeless Tobacco Use: Never     Passive Exposure: Not on file   Financial Resource Strain: Low Risk  (9/6/2024)    Financial Resource Strain     Within the past 12 months, have you or your family members you live with been unable to get utilities (heat, electricity) when it was really needed?: No   Alcohol Use: Not on file   Transportation Needs: Low Risk  (9/6/2024)    Transportation Needs     Within the past 12 months, has lack of transportation kept you from medical appointments, getting your medicines, non-medical meetings or appointments, work, or from getting things that you need?: No   Physical Activity: Not on file   Interpersonal Safety: Low Risk  (9/6/2024)    Interpersonal Safety     Do you feel physically and emotionally safe where you currently live?: Yes     Within the past 12 months, have you been hit, slapped, kicked or otherwise physically hurt by someone?: No     Within the past 12 months, have you been humiliated or emotionally abused in other ways by your partner or ex-partner?: No   Stress: Not on file   Social Connections: Unknown (8/31/2022)    Received from [facility]    Social Connections     Frequency of Communication with Friends and Family: Not on file   Health Literacy: Not on file       Functional Status:  Prior  "to admission patient needed assistance:   Dependent ADLs::  (Patient states that he was indep with all mobility and adl's prior to current hospitalization)  Dependent IADLs::  (Patient's wife was completing the housekeeping and laundry tasks, pt and wife both participated in completion of meal preparation tasks.  Pt was completing the shopping tasks.)       Mental Health Status:  Mental Health Status: No Current Concerns       Chemical Dependency Status:  Chemical Dependency Status: No Current Concerns             Values/Beliefs:  Spiritual, Cultural Beliefs, Cheondoism Practices, Values that affect care:                 Discussed  Partnership in Safe Discharge Planning  document with patient/family: No    Additional Information:  SW received MD orders to see pt for elevated risk score and discharge planning.      Per pt's Admissions H/P written by Dr. Ranjith Hernandez, \"Patient was in his usual state of health and was with his friend on Thursday. Mr. Leon saw his friend start to lose his balance, so he ran to help. Unfortunately, his friend ended up landing on top of the Mr. Leon and fractured his spine at T8-9. \" \"[facility] for surgical management. .\"    ANA met with pt to complete initial assessment and to discuss discharge planning. Pt's wife (Yolette) participated by phone (on speaker phone).      Pt states that he lives with his wife in a side by side duplex.  Pt states that he enters the home from the garage (no steps).  The garage entry enters into the lower level of the home where the \"rec\" room is located.  From the this level, there are a total of 17 steps (with a landing after the first 11) to the main floor where the LR, Kitchen, bed and bathrooms (tub/shower combo) and laundry are located. .  Pt reports that prior to hospitalizations he was indep with all mobility (no assistive device, 5 falls in the past year) and adl's.  Pt states that his wife was " "completing the housekeeping and laundry tasks. Pt and wife shared responsibility for completion of cooking tasks and pt completed the shopping tasks.  Pt states that he was driving, indep taking his medications and managing the household finances.  Pt owns a cane and has a grab bar by the tub.  Pt was not receiving skilled home care services.  Pt does not have a known .  Pt was not utilizing community resources.  Pt states that he receives primary care from Dr. Obregon at [facility] in [place].  Pt does not have a health care directive.   Pt states that he does not have a Pentecostalism or spiritual practice that is important to her.  Pt states that he served 1.5 years in the .  Pt states that he does not have a history of MI/CD.  Pt states that he is retired.   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  Pt states that his income includes Social Security and pension.  Pt states that he has 2 adult children:   Rob, resides in [facility], resides in [place].  Pt has VA insurance.   When asked, pt states that he also has Medicare.      Disposition planning was discussed.  OT and Physical Therapy are recommending a ARU stay and Cash Crews NP, has confirmed readiness for discharge.  ANA spoke with pt and wife about ARU and provided a \"Medicare Care Compare\" list.   Pt states that [facility] [place].    ANA phoned Maia Guillory at the [facility] ([phone]) and left a message for Maia to call in regards to percentage service connection.  ANA phoned the [facility]erral [facility] ([phone]) and spoke with Hailey.  ANA referred pt for acute rehab placement.  Hailey stated that she would have a VA Referral  phone this SW.   ANA received a call from VA Referral , Millie Leon [phone]) who states that pt was on the acute rehab unit at the [facility] prior to pt's transfer to " [facility]. Millie states that she is unsure of how this referral will be handled in terms of the process for return, but requested that this SW fax medical records(SW completed this task).   Millie states that she will get back to this SW in regards to the process for return.        Next Steps:   Await follow up call from the [facility] in regards to arranging for pt's return to ARU.    SW will continue to follow for discharge planning.    NANI Taveras  Social Work, 6A  Phone:  [phone]  Pager:  [phone]  9/13/2024       ERIC Moran

## 2024-09-13 NOTE — DISCHARGE SUMMARY
"[facility] Discharge Summary and Instructions    Judah Leon MRN# [id number]   Age: 84 year old YOB: 1940     Date of Admission:  9/5/2024  Date of Discharge::  9/24/2024   Admitting Physician:  Salas Guadalupe MD  Discharge Physician:  Salas Guadalupe MD          Admission Diagnoses:   Unstable Thoracic Fracture  Thoracic compression fracture (H)          Discharge Diagnosis:     Unstable Thoracic Fracture  Thoracic compression fracture (H)     Clinically Significant Risk Factors Present on Admission    # DMII: A1C = 8.2 % (Ref range: <5.7 %) within past 6 months   # Overweight: Estimated body mass index is 25.61 kg/m  as calculated from the following:    Height as of this encounter: 1.702 m (5' 7.01\").    Weight as of this encounter: 74.2 kg (163 lb 9.3 oz).       # Acute Kidney Injury, resolved   # CKD stage 3  # Metabolic encephalopathy, resolved          Procedures:   Thoracic 7-11 Minimally Invasive Percutaneous Posterior instrumented fusion with Dr. Salas Guadalupe on 9/9/2024            Brief History of Illness:   Judah Leon is a 84 year old-year-old male with a notable PMHx of stage IIIb chronic kidney disease, diabetes, hypertension, history of prostate cancer, anemia, presenting with unstable T 8-9 fracture.  Patient was in his usual state of health and was with his friend on Thursday. Mr. Leon saw his friend start to lose his balance, so he ran to help. Unfortunately, his friend ended up landing on top of the Mr. Leon and fractured his spine at T8-9. He denied loss of consciousness. He denies significant weakness, only endorses sharp pain that begins in his midback and radiates across his abdomen. He denies changes in sensation. He denies incontinence, bowel/bladder symptoms, and saddle anesthesia. [facility] for [facility] management.  Patient has elected to undergo above-mentioned procedure.           Hospital Course:   Patient " underwent above-mentioned procedure on 9/9/2024 .  Following the procedure the patient was transferred to Surgical floor.  The patient's course was complicated by post-operative delirium and uncontrolled glucose levels. By POD 3, patient had cleared mental and returned to baseline mental status. Endocrinology was consulted and management acute increase in blood glucose levels with insulin in the post-operative period.  Plan to return to PTA diabetes regimen upon discharge to rehab.  On POD 4, patient was reporting mild rib pain, CXR obtained revealed no rib fractures. On POD 11 patient still having right rib pain.  EKG revealed normal sinus rhythm. PT/OT recommended ARU placement following discharge.   Patient's stay was prolonged due to insurance approval issues.     On post operative day 15, he was ambulating using walker, voiding without a spencer, eating a regular diet, pain was well controlled and therefore he was discharged to rehab. Patient will continue to wear TLSO brace at all time when out of bed.  Patient will follow-up with Neurosurgery in 6 weeks with upright x-rays.  Surgical staples were removed prior to discharge.             Discharge Medications:     Current Discharge Medication List        START taking these medications    Details   acetaminophen (TYLENOL) 325 MG tablet Take 2 tablets (650 mg) by mouth every 4 hours as needed for other (For optimal non-opioid multimodal pain management to improve pain control.).    Comments: Pain  Associated Diagnoses: S/P fusion of thoracic spine      gabapentin (NEURONTIN) 300 MG capsule Take 1 capsule (300 mg) by mouth 3 times daily.    Comments: pain  Associated Diagnoses: S/P fusion of thoracic spine      !! insulin aspart (NOVOLOG PEN) 100 UNIT/ML pen Inject 1-10 Units subcutaneously 3 times daily (before meals).    Comments: Diabetes  Associated Diagnoses: Diabetes mellitus due to underlying condition with hyperosmolarity without coma, without long-term  current use of insulin      !! insulin aspart (NOVOLOG PEN) 100 UNIT/ML pen Inject 1-7 Units subcutaneously at bedtime.    Comments: Diabetes  Associated Diagnoses: Diabetes mellitus due to underlying condition with hyperosmolarity without coma, without long-term current use of insulin      !! insulin aspart (NOVOLOG PEN) 100 UNIT/ML pen Inject 0-15 Units subcutaneously 3 times daily (with meals).    Associated Diagnoses: S/P fusion of thoracic spine; Diabetes mellitus due to underlying condition with hyperosmolarity without coma, without long-term current use of insulin      insulin glargine (LANTUS PEN) 100 UNIT/ML pen Inject 8 Units subcutaneously every 24 hours.    Comments: If Lantus is not covered by insurance, may substitute Basaglar or Semglee or other insulin glargine product per insurance preference at same dose and frequency.    Associated Diagnoses: S/P fusion of thoracic spine; Diabetes mellitus due to underlying condition with hyperosmolarity without coma, without long-term current use of insulin      methocarbamol (ROBAXIN) 500 MG tablet Take 1 tablet (500 mg) by mouth 4 times daily as needed for muscle spasms.    Comments: Muscle spasms  Associated Diagnoses: S/P fusion of thoracic spine      oxyCODONE (ROXICODONE) 5 MG tablet Take 1 tablet (5 mg) by mouth every 6 hours as needed for moderate to severe pain.  Qty: 20 tablet, Refills: 0    Associated Diagnoses: S/P fusion of thoracic spine      polyethylene glycol (MIRALAX) 17 g packet Take 17 g by mouth 2 times daily.    Comments: Constipation  Associated Diagnoses: S/P fusion of thoracic spine      senna-docusate (SENOKOT-S/PERICOLACE) 8.6-50 MG tablet Take 2 tablets by mouth 2 times daily.    Comments: Constipation  Associated Diagnoses: S/P fusion of thoracic spine       !! - Potential duplicate medications found. Please discuss with provider.        CONTINUE these medications which have CHANGED    Details   amitriptyline (ELAVIL) 10 MG tablet  "Take 1 tablet (10 mg) by mouth every evening.    Comments: depression      metFORMIN (GLUCOPHAGE XR) 500 MG 24 hr tablet Take 1 tablet (500 mg) by mouth 2 times daily (with meals).    Comments: Diabetes      multivitamin w/minerals (THERA-VIT-M) tablet Take 1 tablet by mouth daily.    Comments: supplement      simvastatin (ZOCOR) 40 MG tablet Take 1 tablet (40 mg) by mouth every evening.    Comments: HLD      Vitamin D3 (CHOLECALCIFEROL) 25 mcg (1000 units) tablet Take 1 tablet (25 mcg) by mouth daily.    Comments: supplement           STOP taking these medications       glipiZIDE (GLUCOTROL) 5 MG tablet Comments:   Reason for Stopping:                      Exam:   Physical Exam  BP (!) 167/89 (BP Location: Left arm)   Pulse 81   Temp 97.6  F (36.4  C) (Oral)   Resp 18   Ht 1.702 m (5' 7.01\")   Wt 74.2 kg (163 lb 9.3 oz)   SpO2 97%   BMI 25.61 kg/m    General: Appears comfortable, NAD  Wound: Incision, clean, dry, intact without strikethrough  Neurologic Exam:  - AOx3.  - Follows commands.  - Speech fluent, spontaneous. No aphasia or dysarthria.  - No gaze preference. No apparent hemineglect.  - PERRL, EOMI.  - Face symmetric with sensation intact to light touch.  - Palate elevates symmetrically, uvula midline, tongue protrudes midline.  - Trapezii and sternocleidomastoid muscles 5/5 bilaterally.  - No pronator drift.  Motor: Normal bulk/tone; no tremor, rigidity, or bradykinesia.            Discharge Instructions and Follow-Up:     Discharge diet: Regular   Discharge activity: You may advance activity as tolerated. No strenuous exercise or heay lifting greater than 10 lbs for 4 weeks or until seen and cleared in clinic.   Please continue to wear brace until cleared by Neurosurgical provider.    Discharge follow-up: Follow-up with Neurosurgery in 6 weeks with repeat upright x-rays              Please call if you have:  1. increased pain, redness, drainage, swelling at your incision  2. fevers > 101.5 F " degrees  3. with any questions or concerns.  You may reach the Neurosurgery clinic at [phone] during regular work hours. ER at [phone].    and ask for the Neurosurgery Resident on call at [phone], during off hours or weekends.         Discharge Disposition:     Discharged to home        ASH Gray, CNP  Neurosurgery  Pager [phone]    ASH Muñoz, CNP  Department of Neurosurgery  Pager: [phone]

## 2024-09-14 ENCOUNTER — APPOINTMENT (OUTPATIENT)
Dept: OCCUPATIONAL THERAPY | Facility: CLINIC | Age: 84
DRG: 459 | End: 2024-09-14
Attending: NEUROLOGICAL SURGERY
Payer: COMMERCIAL

## 2024-09-14 LAB
ANION GAP SERPL CALCULATED.3IONS-SCNC: 11 MMOL/L (ref 7–15)
BUN SERPL-MCNC: 28.7 MG/DL (ref 8–23)
CALCIUM SERPL-MCNC: 8.9 MG/DL (ref 8.8–10.4)
CHLORIDE SERPL-SCNC: 104 MMOL/L (ref 98–107)
CREAT SERPL-MCNC: 1.34 MG/DL (ref 0.67–1.17)
EGFRCR SERPLBLD CKD-EPI 2021: 52 ML/MIN/1.73M2
ERYTHROCYTE [DISTWIDTH] IN BLOOD BY AUTOMATED COUNT: 12.8 % (ref 10–15)
GLUCOSE BLDC GLUCOMTR-MCNC: 178 MG/DL (ref 70–99)
GLUCOSE BLDC GLUCOMTR-MCNC: 195 MG/DL (ref 70–99)
GLUCOSE BLDC GLUCOMTR-MCNC: 201 MG/DL (ref 70–99)
GLUCOSE BLDC GLUCOMTR-MCNC: 214 MG/DL (ref 70–99)
GLUCOSE BLDC GLUCOMTR-MCNC: 220 MG/DL (ref 70–99)
GLUCOSE SERPL-MCNC: 197 MG/DL (ref 70–99)
HCO3 SERPL-SCNC: 22 MMOL/L (ref 22–29)
HCT VFR BLD AUTO: 29.3 % (ref 40–53)
HGB BLD-MCNC: 10.2 G/DL (ref 13.3–17.7)
MAGNESIUM SERPL-MCNC: 1.9 MG/DL (ref 1.7–2.3)
MCH RBC QN AUTO: 33 PG (ref 26.5–33)
MCHC RBC AUTO-ENTMCNC: 34.8 G/DL (ref 31.5–36.5)
MCV RBC AUTO: 95 FL (ref 78–100)
PHOSPHATE SERPL-MCNC: 3.2 MG/DL (ref 2.5–4.5)
PLATELET # BLD AUTO: 160 10E3/UL (ref 150–450)
POTASSIUM SERPL-SCNC: 4.3 MMOL/L (ref 3.4–5.3)
RBC # BLD AUTO: 3.09 10E6/UL (ref 4.4–5.9)
SODIUM SERPL-SCNC: 137 MMOL/L (ref 135–145)
WBC # BLD AUTO: 5.1 10E3/UL (ref 4–11)

## 2024-09-14 PROCEDURE — 250N000013 HC RX MED GY IP 250 OP 250 PS 637: Performed by: NEUROLOGICAL SURGERY

## 2024-09-14 PROCEDURE — 250N000011 HC RX IP 250 OP 636: Performed by: NURSE PRACTITIONER

## 2024-09-14 PROCEDURE — 250N000013 HC RX MED GY IP 250 OP 250 PS 637: Performed by: NURSE PRACTITIONER

## 2024-09-14 PROCEDURE — 36415 COLL VENOUS BLD VENIPUNCTURE: CPT | Performed by: NURSE PRACTITIONER

## 2024-09-14 PROCEDURE — 97535 SELF CARE MNGMENT TRAINING: CPT | Mod: GO

## 2024-09-14 PROCEDURE — 83735 ASSAY OF MAGNESIUM: CPT | Performed by: NURSE PRACTITIONER

## 2024-09-14 PROCEDURE — 84100 ASSAY OF PHOSPHORUS: CPT | Performed by: NURSE PRACTITIONER

## 2024-09-14 PROCEDURE — 80048 BASIC METABOLIC PNL TOTAL CA: CPT | Performed by: NURSE PRACTITIONER

## 2024-09-14 PROCEDURE — 120N000002 HC R&B MED SURG/OB UMMC

## 2024-09-14 PROCEDURE — 85014 HEMATOCRIT: CPT | Performed by: NURSE PRACTITIONER

## 2024-09-14 RX ORDER — MAGNESIUM OXIDE 400 MG/1
400 TABLET ORAL EVERY 4 HOURS
Status: COMPLETED | OUTPATIENT
Start: 2024-09-14 | End: 2024-09-14

## 2024-09-14 RX ADMIN — FAMOTIDINE 20 MG: 20 TABLET ORAL at 19:55

## 2024-09-14 RX ADMIN — SIMVASTATIN 40 MG: 40 TABLET, FILM COATED ORAL at 21:49

## 2024-09-14 RX ADMIN — HEPARIN SODIUM 5000 UNITS: 5000 INJECTION, SOLUTION INTRAVENOUS; SUBCUTANEOUS at 04:18

## 2024-09-14 RX ADMIN — METHOCARBAMOL 500 MG: 500 TABLET ORAL at 12:20

## 2024-09-14 RX ADMIN — MAGNESIUM OXIDE TAB 400 MG (241.3 MG ELEMENTAL MG) 400 MG: 400 (241.3 MG) TAB at 12:20

## 2024-09-14 RX ADMIN — FAMOTIDINE 20 MG: 20 TABLET ORAL at 09:01

## 2024-09-14 RX ADMIN — METHOCARBAMOL 500 MG: 500 TABLET ORAL at 15:42

## 2024-09-14 RX ADMIN — GABAPENTIN 300 MG: 300 CAPSULE ORAL at 09:00

## 2024-09-14 RX ADMIN — ACETAMINOPHEN 650 MG: 325 TABLET ORAL at 13:20

## 2024-09-14 RX ADMIN — Medication 25 MCG: at 09:02

## 2024-09-14 RX ADMIN — MAGNESIUM HYDROXIDE 30 ML: 400 SUSPENSION ORAL at 09:03

## 2024-09-14 RX ADMIN — Medication 1 TABLET: at 09:02

## 2024-09-14 RX ADMIN — AMITRIPTYLINE HYDROCHLORIDE 10 MG: 10 TABLET, FILM COATED ORAL at 21:49

## 2024-09-14 RX ADMIN — INSULIN ASPART 2 UNITS: 100 INJECTION, SOLUTION INTRAVENOUS; SUBCUTANEOUS at 19:00

## 2024-09-14 RX ADMIN — HEPARIN SODIUM 5000 UNITS: 5000 INJECTION, SOLUTION INTRAVENOUS; SUBCUTANEOUS at 12:20

## 2024-09-14 RX ADMIN — METHOCARBAMOL 500 MG: 500 TABLET ORAL at 09:02

## 2024-09-14 RX ADMIN — INSULIN GLARGINE 8 UNITS: 100 INJECTION, SOLUTION SUBCUTANEOUS at 10:01

## 2024-09-14 RX ADMIN — MAGNESIUM OXIDE TAB 400 MG (241.3 MG ELEMENTAL MG) 400 MG: 400 (241.3 MG) TAB at 09:01

## 2024-09-14 RX ADMIN — LIDOCAINE 2 PATCH: 560 PATCH PERCUTANEOUS; TOPICAL; TRANSDERMAL at 09:05

## 2024-09-14 RX ADMIN — INSULIN ASPART 2 UNITS: 100 INJECTION, SOLUTION INTRAVENOUS; SUBCUTANEOUS at 12:42

## 2024-09-14 RX ADMIN — METHOCARBAMOL 500 MG: 500 TABLET ORAL at 19:55

## 2024-09-14 RX ADMIN — HEPARIN SODIUM 5000 UNITS: 5000 INJECTION, SOLUTION INTRAVENOUS; SUBCUTANEOUS at 19:55

## 2024-09-14 RX ADMIN — GABAPENTIN 300 MG: 300 CAPSULE ORAL at 13:21

## 2024-09-14 RX ADMIN — GABAPENTIN 300 MG: 300 CAPSULE ORAL at 19:55

## 2024-09-14 RX ADMIN — SENNOSIDES AND DOCUSATE SODIUM 2 TABLET: 8.6; 5 TABLET ORAL at 09:03

## 2024-09-14 ASSESSMENT — ACTIVITIES OF DAILY LIVING (ADL)
ADLS_ACUITY_SCORE: 30

## 2024-09-14 NOTE — PLAN OF CARE
Status: S/p T7-11 percutaneous posterior instrumented fusion for unstable T8-9 fracture. Off insulin drip as of 9/11. Hx: CKD, prostate Ca and DMII   Vitals: VSS on RA   Neuros: AOx4. Forgetful. R bottom eyelid red, vision intact. Speech intact. Numbness to feet baseline.    IV: PIV SL   Diet: Regular diet with carb coverage   Bowel status: LBM 9/12  : Voiding spont  Skin: Back incision with primapore, CDI  Pain: 2/10, back pain managed with robaxin and gabapentin   Activity: A1 + GB and walker. TLSO OOB  Social: Family at bedside this evening  Plan: Pending placement to ARU.       Goal Outcome Evaluation:           Overall Patient Progress: no changeOverall Patient Progress: no change

## 2024-09-14 NOTE — PLAN OF CARE
[facility]  [address], [address]  [place]      Visit Date:  10/19/2021  Weight Management Postop Follow-up    HPI:      Chelsea Duffy is a 38 y.o. female who is here today for 1 weeks follow up since  robotic-assisted sleeve gastrectomy performed by Dr. Mona Ovalles  on 10/11/21 . Pt reports that she is doing well. Pt reports that she is drinking 64 oz of fluid and 20-50 grams of protein daily. Struggling with protein tasting too sweet. Tolerating [employer] protein shakes. Tolerating broth/strained creamed soups. Alternate options discussed today. No issue with bowel movements. Nausea with MV only. No emesis. Denies heartburn/ GERD - continues to take PPI. Denies sx of dehydration. No fever or chills. Denies CP/SOB. No Abdominal pain. Minimal incisional discomfort. Taking and toelrating Calcium. MV causing nausea. Will try smashing MV/blending in shake. If unable to tolerate- will switch to [facility] with Iron 2 daily. Off all pain meds. Taking Tylenol. Taking Lovenox, as rx. Weight today 295#. Total weight loss since surgery is 11 pounds. Checking blood sugars and running low 100's. Off all DM medication currently. BP stable with medication. Advised to monitor. Taking Lipitor daily. Taking Prozac daily. Physical Activity:  Walking 5-10 minutes every 2 hours. Current BMI: Body mass index is 48.85 kg/m².   Current Weight:   Wt Readings from Last 3 Encounters:   10/19/21 295 lb 12.8 oz (134.2 kg)   10/11/21 (!) 306 lb 6.4 oz (139 kg)   08/16/21 (!) 309 lb 9.6 oz (140.4 kg)     Pre-op Body Weight:306      Past Medical History:  Past Medical History:   Diagnosis Date    Back pain     Body mass index 50.0-59.9, adult ([address] 06/23/2021    Depression     Diabetes mellitus (HCC)     GERD (gastroesophageal reflux disease)     Hypercholesteremia     Hypertension     UTI (urinary tract infection)        Past Surgical History:  Past Status: S/P T7-11 percutaneous Posterior instrumented fusion for unstable T8-9 fracture on 9/9. Off insulin drip as of 9/11. Hx of CkD, prostate Ca, and DMII.   Vitals: HTN without prns or parms. HR good. On Ra.   Neuros: Aox4. Forgetful. PERRLA. R Bottom eyelid red. Speech intact. 5/5 t/o. Numbness to feet baseline.   IV: SL.   Labs/Electrolytes: ACHS. Mg replaced on shift, redraw in am.   Resp/trach: On RA LSC.   Diet: Reg with carb coverage 1:20. Carb counts.   Bowel status: LBM 9/13 on shift. Passing gas.   : Voids spont to urinal or BR with straining and urgency. Intermittent incontinence.   Skin: Back incision- change by writer. Lidocaine x2  to bilat back.  Pain: Aching/Punchig pain to back and abdomen well controlled with schedule meds,  lidocaine patches, and PRN Tylenol.   Activity: A1 Gb Walker and TLSO when oob. Sat up in chair. Declined walk.   Social: Wife Yolette at bedside.   Plan: SW following for ARU placement.    Surgical History:   Procedure Laterality Date    CHOLECYSTECTOMY      COLONOSCOPY      ENDOMETRIAL ABLATION      HYSTERECTOMY ABDOMINAL N/A 12/12/2017    ROBOTIC TOTAL HYSTERECTOMY WITH BILATERAL SALPINGECTOMY performed by Bronson Perez MD at [facility]      vocal cord mass removed    OVARIAN CYST REMOVAL      SLEEVE GASTRECTOMY N/A 10/11/2021    GASTRECTOMY SLEEVE LAPAROSCOPIC ROBOTIC performed by Moy Christensen MD at [address] N/A 2/9/2021    EGD BIOPSY performed by Arnaldo Jesus MD at [facility]       Past Social History:  Social History     Socioeconomic History    Marital status:      Spouse name: Not on file    Number of children: Not on file    Years of education: Not on file    Highest education level: Not on file   Occupational History    Not on file   Tobacco Use    Smoking status: Never Smoker    Smokeless tobacco: Never Used   Vaping Use    Vaping Use: Never used   Substance and Sexual Activity    Alcohol use: No    Drug use: No    Sexual activity: Yes     Partners: Male   Other Topics Concern    Not on file   Social History Narrative    Not on file     Social Determinants of Health     Financial Resource Strain: Low Risk     Difficulty of Paying Living Expenses: Not hard at all   Food Insecurity: No Food Insecurity    Worried About [address] in the Last Year: Never true    [facility] in the Last Year: Never true   Transportation Needs:     Lack of Transportation (Medical):      Lack of Transportation (Non-Medical):    Physical Activity:     Days of Exercise per Week:     Minutes of Exercise per Session:    Stress:     Feeling of Stress :    Social Connections:     Frequency of Communication with Friends and Family:     Frequency of Social Gatherings with Friends and Family:     Attends Gnosticism Services:     Active Member of Clubs or Organizations:     Attends Club or Organization Meetings:     Marital Status:    Intimate Partner Violence:     Fear of Current or Ex-Partner:     Emotionally Abused:     Physically Abused:     Sexually Abused:         Medications:   Current Outpatient Medications   Medication Sig Dispense Refill    Multiple Vitamins-Minerals (CELEBRATE MULTI-COMPLETE 39) CHEW Take by mouth      Calcium Citrate-Vitamin D (CALCIUM + VIT D, BARIATRIC ADVANTAGE, CHEWABLE TABLET) Take 1 tablet by mouth daily      Cyanocobalamin (B-12 SL) Place 1 tablet under the tongue daily      omeprazole (PRILOSEC) 40 MG delayed release capsule Take 1 capsule by mouth every morning (before breakfast) Open capsule post-op 30 capsule 2    atorvastatin (LIPITOR) 20 MG tablet TAKE 1 TABLET BY MOUTH ONE TIME A DAY 90 tablet 1    FLUoxetine (PROZAC) 20 MG capsule Take 3 capsules by mouth daily 360 capsule 3    lisinopril-hydroCHLOROthiazide (PRINZIDE;ZESTORETIC) 20-12.5 MG per tablet Take 1 tablet by mouth daily 90 tablet 3     No current facility-administered medications for this visit. Allergies:   No Known Allergies    Subjective:    Review of Systems:  Constitutional: Denies any fever, chills, fatigue. Wound: Denies any rash, skin color changes or wound problems. Resp: Denies any cough, shortness of breath. CV: Denies any chest pain, orthopnea or syncope. MS: Denies myalgias, arthralgias. GI: (+) nausea, vomiting, diarrhea, constipation, melena, hematochezia. (+) incisional discomfort. : Denies any hematuria, hesitancy or dysuria. NEURO: Denies seizures, headache. Objective:    /88 (Site: Left Upper Arm, Position: Sitting, Cuff Size: Large Adult)   Pulse 80   Temp 96.6 °F (35.9 °C) (Infrared)   Resp 18   Ht 5' 5.25\" (1.657 m)   Wt 295 lb 12.8 oz (134.2 kg)   BMI 48.85 kg/m²     Physical Examination:   Constitutional: Alert and oriented to person, place and time. Well-developed, well- nourished.   Head: Normocephalic and atraumatic  Neck: Supple. Eyes: EOMI b/l. Conjunctivae normal.  No scleral icterus. Respiratory: Effort normal. No respiratory distress. Abd: Steri-strips removed. Incisions are mildly tender without signs of infection. Healing nicely. Ext:  Movement x 4. No edema  Skin; Warm and dry, no visible rashes, lesions or ulcers.    Neuro: Cranial Nerves Grossly Intact; nml coordination    Labs:  CBC   Lab Results   Component Value Date    WBC 12.0 10/09/2021    RBC 5.13 10/09/2021    HGB 12.8 10/12/2021    HCT 39.3 10/12/2021    MCV 90.3 10/09/2021    MCH 30.2 10/09/2021    MCHC 33.5 10/09/2021    RDW 13.1 12/30/2017     10/09/2021    MPV 11.1 10/09/2021    RBCMORP NORMAL 08/11/2017    SEGSPCT 64.1 10/09/2021    LABLYMP 30.1 10/09/2021    MONOPCT 4.2 10/09/2021    LABEOS 0.7 10/09/2021    BASO 0.3 10/09/2021    NRBC 0 10/09/2021    SEGSABS 7.7 10/09/2021    LYMPHSABS 3.6 10/09/2021    MONOSABS 0.5 10/09/2021    EOSABS 0.1 10/09/2021    BASOSABS 0.0 10/09/2021        BMP/CMP   Lab Results   Component Value Date    GLUCOSE 167 10/12/2021    CREATININE 0.8 10/12/2021    BUN 16 10/12/2021     10/12/2021    K 4.1 10/12/2021     10/12/2021    CO2 22 10/12/2021    CALCIUM 8.6 10/12/2021    AST 27 11/14/2020    ALKPHOS 76 11/14/2020    PROT 7.1 11/14/2020    LABALBU 4.3 11/14/2020    BILITOT 0.6 11/14/2020    ALT 49 11/14/2020        PREALBUMIN   Lab Results   Component Value Date    PREALBUMIN 26.9 11/14/2020        VITAMIN B12   Lab Results   Component Value Date    IQHJGQXN64 415 11/14/2020        24 HOUR URINE CALCIUM   No results found for: [place], CALCIUMUR     VITAMIN D   Lab Results   Component Value Date    VITD25 35 11/14/2020        VITAMIN B1/ THIAMINE   Lab Results   Component Value Date    [id number] 144 11/14/2020        RBC FOLATE   Lab Results   Component Value Date    FOLATE > 20.0 11/14/2020        LIPID SCREEN (FASTING)   Lab Results   Component Value Date    CHOL 206 08/25/2021    TRIG 284 dietitian  Danii Mayo samples given today  Return in about 1 week (around 10/26/2021) for postop follow up. I spent over 20 minutes with the patient, with greater that 50% of that time spent on face counseling for nutrition and exercise.     Electronically signed by NADEEM Layton on 10/19/2021 at 4:04 PM

## 2024-09-14 NOTE — PLAN OF CARE
Status: S/p T7-11 percutaneous posterior instrumented fusion for unstable T8-9 fracture. Off insulin drip as of 9/11. Hx: CKD, prostate Ca and DMII   Vitals: VSS  Neuros: AOx4. Forgetful. R bottom eyelid red, vision intact. Speech intact. Numbness to feet baseline.  5/5 strengths throughout  IV: PIV SL   Labs/Electrolytes: , ACHS  Resp/trach: LSC, on RA  Diet: Regular diet with carb coverage   Bowel status: LBM 9/12, passing gas  : Voids with frequency  Skin: Back incision with primapore, CDI  Pain: pt reported mild back pain but refused pain intervention.    Activity: A1/GB/walker. TLSO OOB  Plan: Pending placement to ARU. Continue with POC  Updates: Pt reported chest pain below the ribs this morning, assessed by resident

## 2024-09-14 NOTE — PROGRESS NOTES
"[facility], [place]   Neurosurgery Progress Note:    Date of service: 9/13/2024    Assessment: Judah Leon is a 84 year old male  s/p Thoracic 7-11 Minimally Invasive Percutaneous Posterior instrumented fusion with Dr. Salas Guadalupe on 9/9/2024    Clinically Significant Risk Factors        # DMII: A1C = 8.2 % (Ref range: <5.7 %) within past 6 months   # Overweight: Estimated body mass index is 25.61 kg/m  as calculated from the following:    Height as of this encounter: 1.702 m (5' 7.01\").    Weight as of this encounter: 74.2 kg (163 lb 9.3 oz).       # Acute Kidney Injury, resolved   # CKD stage 3  # Metabolic encephalopathy, resolved     Plan:  - Neuro checks/vital signs: Q4 hours  - Pain control: PO oxycodone, tylenol, robaxin and gabapentin   - Activity: up with assist   - Restrictions/Bracing: TLSO when out of bed   - Diet: regular   - DVT prophylaxis: SCDs, subcutaneous heparin   - PT/OT: ARU  - Appreciate Endocrinology assistance regarding blood glucose control  - Bowel regimen    Wound Cares:  Please cleanse wound daily with betadine and replace dressing daily.  Please keep wound covered at all times except when changing dressing until you are seen in clinic by Dr. Salas Guadalupe.  It is ok to remove and replaced soiled dressings.  Please continue bed baths and do not shower until follow-up in clinic with Dr. Guadalupe.    Homer Dixon MD  Neurosurgery  Pager: [phone]       Interval History:  Patient reports back pain is controlled.  No acute events overnight. Ongoing pain management.    Objective:   Temp:  [97.4  F (36.3  C)-98.2  F (36.8  C)] 97.9  F (36.6  C)  Pulse:  [76-96] 76  Resp:  [16] 16  BP: (115-153)/() 153/81  SpO2:  [93 %-99 %] 99 %  I/O last 3 completed shifts:  In: 720 [P.O.:720]  Out: 475 [Urine:475]    Gen: Appears comfortable, NAD  Wound: Incision, clean, dry, intact without strikethrough  Neurologic:  - Alert & Oriented to person, place, time, and situation  - Follows " commands briskly  - Speech fluent, spontaneous. No aphasia or dysarthria.  - No gaze preference. No apparent hemineglect.  - PERRL, EOMI  - Strong eye closure, jaw clench, and cheek puff  - Face symmetric with sensation intact to light touch  - Palate elevates symmetrically, uvula midline, tongue protrudes midline  - Trapezii and sternocleidomastoid muscles 5/5 bilaterally  - No pronator drift     Del Tr Bi WE WF Gr   R 5 5 5 5 5 5   L 5 5 5 5 5 5    HF KE KF DF PF EHL   R 5 5 5 5 5 5   L 5 5 5 5 5 5     Reflexes 2+ throughout    Sensation intact and symmetric to light touch throughout    LABS  Recent Labs   Lab Test 09/14/24  0636 09/13/24  0611 09/12/24  0640   WBC 5.1 5.8 6.9   HGB 10.2* 10.6* 11.0*   MCV 95 94 94    158 147*       Recent Labs   Lab Test 09/14/24  1210 09/14/24  0954 09/14/24  0636 09/13/24  0945 09/13/24  0611 09/12/24  0955 09/12/24  0640   NA  --   --  137  --  136  --  137   POTASSIUM  --   --  4.3  --  4.3  --  4.3   CHLORIDE  --   --  104  --  104  --  106   CO2  --   --  22  --  21*  --  18*   BUN  --   --  28.7*  --  27.9*  --  26.3*   CR  --   --  1.34*  --  1.44*  --  1.32*   ANIONGAP  --   --  11  --  11  --  13   ROB  --   --  8.9  --  8.5*  --  8.7*   * 195* 197*   < > 186*   < > 156*    < > = values in this interval not displayed.     I have seen this patient with the resident and formulated a plan and agree with this note.  AMP

## 2024-09-15 ENCOUNTER — APPOINTMENT (OUTPATIENT)
Dept: PHYSICAL THERAPY | Facility: CLINIC | Age: 84
DRG: 459 | End: 2024-09-15
Attending: NEUROLOGICAL SURGERY
Payer: COMMERCIAL

## 2024-09-15 LAB
ANION GAP SERPL CALCULATED.3IONS-SCNC: 10 MMOL/L (ref 7–15)
BUN SERPL-MCNC: 33 MG/DL (ref 8–23)
CALCIUM SERPL-MCNC: 9.3 MG/DL (ref 8.8–10.4)
CHLORIDE SERPL-SCNC: 103 MMOL/L (ref 98–107)
CREAT SERPL-MCNC: 1.47 MG/DL (ref 0.67–1.17)
EGFRCR SERPLBLD CKD-EPI 2021: 46 ML/MIN/1.73M2
ERYTHROCYTE [DISTWIDTH] IN BLOOD BY AUTOMATED COUNT: 12.7 % (ref 10–15)
GLUCOSE BLDC GLUCOMTR-MCNC: 168 MG/DL (ref 70–99)
GLUCOSE BLDC GLUCOMTR-MCNC: 196 MG/DL (ref 70–99)
GLUCOSE BLDC GLUCOMTR-MCNC: 200 MG/DL (ref 70–99)
GLUCOSE BLDC GLUCOMTR-MCNC: 236 MG/DL (ref 70–99)
GLUCOSE BLDC GLUCOMTR-MCNC: 259 MG/DL (ref 70–99)
GLUCOSE SERPL-MCNC: 200 MG/DL (ref 70–99)
HCO3 SERPL-SCNC: 24 MMOL/L (ref 22–29)
HCT VFR BLD AUTO: 32.3 % (ref 40–53)
HGB BLD-MCNC: 11.1 G/DL (ref 13.3–17.7)
MAGNESIUM SERPL-MCNC: 2 MG/DL (ref 1.7–2.3)
MCH RBC QN AUTO: 32.6 PG (ref 26.5–33)
MCHC RBC AUTO-ENTMCNC: 34.4 G/DL (ref 31.5–36.5)
MCV RBC AUTO: 95 FL (ref 78–100)
PHOSPHATE SERPL-MCNC: 3.3 MG/DL (ref 2.5–4.5)
PLATELET # BLD AUTO: 166 10E3/UL (ref 150–450)
POTASSIUM SERPL-SCNC: 4.5 MMOL/L (ref 3.4–5.3)
RBC # BLD AUTO: 3.4 10E6/UL (ref 4.4–5.9)
SODIUM SERPL-SCNC: 137 MMOL/L (ref 135–145)
WBC # BLD AUTO: 5.8 10E3/UL (ref 4–11)

## 2024-09-15 PROCEDURE — 36415 COLL VENOUS BLD VENIPUNCTURE: CPT | Performed by: NURSE PRACTITIONER

## 2024-09-15 PROCEDURE — 85027 COMPLETE CBC AUTOMATED: CPT | Performed by: NURSE PRACTITIONER

## 2024-09-15 PROCEDURE — 97530 THERAPEUTIC ACTIVITIES: CPT | Mod: GP | Performed by: REHABILITATION PRACTITIONER

## 2024-09-15 PROCEDURE — 250N000013 HC RX MED GY IP 250 OP 250 PS 637: Performed by: NEUROLOGICAL SURGERY

## 2024-09-15 PROCEDURE — 80048 BASIC METABOLIC PNL TOTAL CA: CPT | Performed by: NURSE PRACTITIONER

## 2024-09-15 PROCEDURE — 120N000002 HC R&B MED SURG/OB UMMC

## 2024-09-15 PROCEDURE — 250N000013 HC RX MED GY IP 250 OP 250 PS 637: Performed by: NURSE PRACTITIONER

## 2024-09-15 PROCEDURE — 83735 ASSAY OF MAGNESIUM: CPT | Performed by: NURSE PRACTITIONER

## 2024-09-15 PROCEDURE — 97116 GAIT TRAINING THERAPY: CPT | Mod: GP | Performed by: REHABILITATION PRACTITIONER

## 2024-09-15 PROCEDURE — 84100 ASSAY OF PHOSPHORUS: CPT | Performed by: NURSE PRACTITIONER

## 2024-09-15 PROCEDURE — 82310 ASSAY OF CALCIUM: CPT | Performed by: NURSE PRACTITIONER

## 2024-09-15 PROCEDURE — 250N000011 HC RX IP 250 OP 636: Performed by: NURSE PRACTITIONER

## 2024-09-15 RX ORDER — MAGNESIUM OXIDE 400 MG/1
400 TABLET ORAL EVERY 4 HOURS
Status: COMPLETED | OUTPATIENT
Start: 2024-09-15 | End: 2024-09-15

## 2024-09-15 RX ADMIN — MAGNESIUM HYDROXIDE 30 ML: 400 SUSPENSION ORAL at 08:14

## 2024-09-15 RX ADMIN — INSULIN ASPART 2 UNITS: 100 INJECTION, SOLUTION INTRAVENOUS; SUBCUTANEOUS at 17:43

## 2024-09-15 RX ADMIN — METHOCARBAMOL 500 MG: 500 TABLET ORAL at 08:14

## 2024-09-15 RX ADMIN — SIMVASTATIN 40 MG: 40 TABLET, FILM COATED ORAL at 22:03

## 2024-09-15 RX ADMIN — OXYCODONE HYDROCHLORIDE 2.5 MG: 5 TABLET ORAL at 13:38

## 2024-09-15 RX ADMIN — INSULIN GLARGINE 8 UNITS: 100 INJECTION, SOLUTION SUBCUTANEOUS at 09:05

## 2024-09-15 RX ADMIN — HEPARIN SODIUM 5000 UNITS: 5000 INJECTION, SOLUTION INTRAVENOUS; SUBCUTANEOUS at 04:30

## 2024-09-15 RX ADMIN — METHOCARBAMOL 500 MG: 500 TABLET ORAL at 15:52

## 2024-09-15 RX ADMIN — LIDOCAINE 2 PATCH: 560 PATCH PERCUTANEOUS; TOPICAL; TRANSDERMAL at 08:18

## 2024-09-15 RX ADMIN — MAGNESIUM OXIDE TAB 400 MG (241.3 MG ELEMENTAL MG) 400 MG: 400 (241.3 MG) TAB at 12:23

## 2024-09-15 RX ADMIN — HEPARIN SODIUM 5000 UNITS: 5000 INJECTION, SOLUTION INTRAVENOUS; SUBCUTANEOUS at 12:23

## 2024-09-15 RX ADMIN — METHOCARBAMOL 500 MG: 500 TABLET ORAL at 20:57

## 2024-09-15 RX ADMIN — FAMOTIDINE 20 MG: 20 TABLET ORAL at 20:57

## 2024-09-15 RX ADMIN — POLYETHYLENE GLYCOL 3350 17 G: 17 POWDER, FOR SOLUTION ORAL at 08:18

## 2024-09-15 RX ADMIN — SENNOSIDES AND DOCUSATE SODIUM 2 TABLET: 8.6; 5 TABLET ORAL at 08:14

## 2024-09-15 RX ADMIN — Medication 1 TABLET: at 08:14

## 2024-09-15 RX ADMIN — Medication 25 MCG: at 08:14

## 2024-09-15 RX ADMIN — HEPARIN SODIUM 5000 UNITS: 5000 INJECTION, SOLUTION INTRAVENOUS; SUBCUTANEOUS at 21:00

## 2024-09-15 RX ADMIN — GABAPENTIN 300 MG: 300 CAPSULE ORAL at 20:58

## 2024-09-15 RX ADMIN — METHOCARBAMOL 500 MG: 500 TABLET ORAL at 12:23

## 2024-09-15 RX ADMIN — FAMOTIDINE 20 MG: 20 TABLET ORAL at 08:14

## 2024-09-15 RX ADMIN — MAGNESIUM OXIDE TAB 400 MG (241.3 MG ELEMENTAL MG) 400 MG: 400 (241.3 MG) TAB at 15:52

## 2024-09-15 RX ADMIN — SENNOSIDES AND DOCUSATE SODIUM 2 TABLET: 8.6; 5 TABLET ORAL at 20:57

## 2024-09-15 RX ADMIN — AMITRIPTYLINE HYDROCHLORIDE 10 MG: 10 TABLET, FILM COATED ORAL at 22:03

## 2024-09-15 RX ADMIN — GABAPENTIN 300 MG: 300 CAPSULE ORAL at 13:41

## 2024-09-15 RX ADMIN — INSULIN ASPART 1 UNITS: 100 INJECTION, SOLUTION INTRAVENOUS; SUBCUTANEOUS at 13:03

## 2024-09-15 RX ADMIN — GABAPENTIN 300 MG: 300 CAPSULE ORAL at 08:13

## 2024-09-15 ASSESSMENT — ACTIVITIES OF DAILY LIVING (ADL)
ADLS_ACUITY_SCORE: 30

## 2024-09-15 NOTE — PLAN OF CARE
Status: S/P T7-11 percutaneous Posterior instrumented fusion for unstable T8-9 fracture on 9/9. Off insulin drip as of 9/11. Hx of CkD, prostate Ca, and DMII.   Vitals: VSS ex HTN  Neuros: A&Ox4. Forgetful. R Bottom eyelid red, vision intact. Numbness to feet baseline.   IV: PIV SL  Labs/Electrolytes: BG checks ACHS  Resp/trach: On RA  Diet: regular diet w/ carb coverage  Bowel status: LBM 9/14  : voids spont to BR or urinal, intermittent incontinence  Skin: back incision covered w Primapore CDI  Pain: denies pain at this time  Activity: A1 GB/walker, TLSO when OOB.   Plan&updates: awaiting ARU placement, follow POC

## 2024-09-15 NOTE — PLAN OF CARE
Status: S/P T7-11 percutaneous Posterior instrumented fusion for unstable T8-9 fracture on 9/9. Off insulin drip as of 9/11. Hx of CkD, prostate Ca, and DMII.   Vitals: VSS ex HTN  Neuros: A&Ox4. Forgetful. R Bottom eyelid red, vision intact. Numbness to feet baseline.   IV: PIV SL  Labs/Electrolytes: BG checks ACHS  Resp/trach: O2 intermittently dipping down when sleeping - 1L O2 applied  Diet: regular diet w/ carb coverage  Bowel status: LBM 9/14  : voids spont to BR or urinal, intermittent incontinence  Skin: back incision covered w Primapore CDI  Pain: back/rib pain managed w/ scheduled meds, PRN tylenol, and lidocaine patches  Activity: A1 GB/walker, TLSO when OOB. 3 laps around unit this afternoon  Plan&updates: awaiting ARU placement, follow POC    Goal Outcome Evaluation:      Plan of Care Reviewed With: patient    Overall Patient Progress: no changeOverall Patient Progress: no change

## 2024-09-15 NOTE — PLAN OF CARE
Status: S/P T7-11 percutaneous Posterior instrumented fusion for unstable T8-9 fracture on 9/9. Off insulin drip as of 9/11. Hx of CkD, prostate Ca, and DMII.   Vitals: VSS on RA   Neuros: A&O x4, forgetful. Numbness to feet at baseline. Strengths 5/5 t/o with GW  IV: PIV SL  Labs/Electrolytes: BG checks ACHS. Magnesium replaced, recheck in AM  Resp/trach: Denies SOB  Diet: Regular diet with carb coverage, good appetite   Bowel status: LBM 9/14  : Voiding spontaneously to BR or urinal   Skin: Back incisions with staples, wound care complete this AM. Small skin tears between incisions, MD aware. Dressing CDI   Pain: Managed with scheduled meds, PRN oxycodone given x1. Lidocaine patches on lower back.   Activity: A1/GB/walker, TLSO when OOB. Up in chair x2, walked in hallway x2   Social: Wife Yolette visited   Plan: Continue POC. SW following for placement

## 2024-09-15 NOTE — PLAN OF CARE
Status: S/P T7-11 percutaneous Posterior instrumented fusion for unstable T8-9 fracture on 9/9. Off insulin drip as of 9/11. Hx of CkD, prostate Ca, and DMII.   Vitals: HTN without prns or parms. HR good. On Ra.   Neuros: Aox4. Forgetful. PERRLA. R Bottom eyelid red. Speech intact. 5/5 t/o. Numbness to feet baseline.   IV: SL.   Labs/Electrolytes: ACHS. Mg replaced on shift, redraw in am.   Resp/trach: On RA LSC.   Diet: Reg with carb coverage 1:20. Carb counts.   Bowel status: LBM 9/13 on shift. Passing gas.   : Voids spont to urinal or BR with straining and urgency. Intermittent incontinence.   Skin: Back incision- change by writer. Lidocaine x2  to bilat back.  Pain: Aching/Punchig pain to back and abdomen well controlled with schedule meds,  lidocaine patches, and PRN Tylenol.   Activity: A1 Gb Walker and TLSO when oob. Sat up in chair. Declined walk.   Social: Wife Yolette at bedside.   Plan: SW following for ARU placement.

## 2024-09-15 NOTE — PROGRESS NOTES
"[facility], [place]   Neurosurgery Progress Note:    Date of service: 9/15/2024    Assessment: Judah Leon is a 84 year old male  s/p Thoracic 7-11 Minimally Invasive Percutaneous Posterior instrumented fusion with Dr. Salas Guadalupe on 9/9/2024    Clinically Significant Risk Factors        # DMII: A1C = 8.2 % (Ref range: <5.7 %) within past 6 months   # Overweight: Estimated body mass index is 25.61 kg/m  as calculated from the following:    Height as of this encounter: 1.702 m (5' 7.01\").    Weight as of this encounter: 74.2 kg (163 lb 9.3 oz).       # Acute Kidney Injury, resolved   # CKD stage 3  # Metabolic encephalopathy, resolved     Plan:  - Neuro checks/vital signs: Q4 hours  - Pain control: PO oxycodone, tylenol, robaxin and gabapentin   - Activity: up with assist   - Restrictions/Bracing: TLSO when out of bed   - Diet: regular   - DVT prophylaxis: SCDs, subcutaneous heparin   - PT/OT: ARU  - Appreciate Endocrinology assistance regarding blood glucose control  - Bowel regimen    Wound Cares:  Please cleanse wound daily with betadine and replace dressing daily.  Please keep wound covered at all times except when changing dressing until you are seen in clinic by Dr. Salas Guadalupe.  It is ok to remove and replaced soiled dressings.  Please continue bed baths and do not shower until follow-up in clinic with Dr. Guadalupe.    April Hull MD, PGY-1  Department of Neurosurgery  Pager: [phone]    Please contact neurosurgery resident on call with questions.    Dial * * [phone], enter [phone] when prompted.       Interval History:  Patient reports back pain is controlled.  No acute events overnight. Ongoing pain management. [facility].    Objective:   Temp:  [97.9  F (36.6  C)-99.1  F (37.3  C)] 98.3  F (36.8  C)  Pulse:  [73-83] 73  Resp:  [16] 16  BP: (136-164)/(77-89) 164/89  SpO2:  [97 %-98 %] 98 %  I/O last 3 completed shifts:  In: 720 [P.O.:720]  Out: 605 [Urine:605]    Gen: " Appears comfortable, NAD  Wound: Incision, clean, dry, intact without strikethrough  Neurologic:  - Alert & Oriented to person, place, time, and situation  - Follows commands briskly  - Speech fluent, spontaneous. No aphasia or dysarthria.  - No gaze preference. No apparent hemineglect.  - PERRL, EOMI  - Strong eye closure, jaw clench, and cheek puff  - Face symmetric with sensation intact to light touch  - Palate elevates symmetrically, uvula midline, tongue protrudes midline  - Trapezii and sternocleidomastoid muscles 5/5 bilaterally  - No pronator drift     Del Tr Bi WE WF Gr   R 5 5 5 5 5 5   L 5 5 5 5 5 5    HF KE KF DF PF EHL   R 5 5 5 5 5 5   L 5 5 5 5 5 5     Reflexes 2+ throughout    Sensation intact and symmetric to light touch throughout    LABS  Recent Labs   Lab Test 09/15/24  0711 09/14/24  0636 09/13/24  0611   WBC 5.8 5.1 5.8   HGB 11.1* 10.2* 10.6*   MCV 95 95 94    160 158       Recent Labs   Lab Test 09/15/24  0841 09/15/24  0711 09/15/24  0214 09/14/24  0954 09/14/24  0636 09/13/24  0945 09/13/24  0611   NA  --  137  --   --  137  --  136   POTASSIUM  --  4.5  --   --  4.3  --  4.3   CHLORIDE  --  103  --   --  104  --  104   CO2  --  24  --   --  22  --  21*   BUN  --  33.0*  --   --  28.7*  --  27.9*   CR  --  1.47*  --   --  1.34*  --  1.44*   ANIONGAP  --  10  --   --  11  --  11   ROB  --  9.3  --   --  8.9  --  8.5*   * 200* 168*   < > 197*   < > 186*    < > = values in this interval not displayed.     I have seen this patient with the resident and formulated a plan and agree with this note.  AMP

## 2024-09-16 ENCOUNTER — APPOINTMENT (OUTPATIENT)
Dept: PHYSICAL THERAPY | Facility: CLINIC | Age: 84
DRG: 459 | End: 2024-09-16
Attending: NEUROLOGICAL SURGERY
Payer: COMMERCIAL

## 2024-09-16 LAB
ANION GAP SERPL CALCULATED.3IONS-SCNC: 10 MMOL/L (ref 7–15)
BUN SERPL-MCNC: 33.1 MG/DL (ref 8–23)
CALCIUM SERPL-MCNC: 9.4 MG/DL (ref 8.8–10.4)
CHLORIDE SERPL-SCNC: 102 MMOL/L (ref 98–107)
CREAT SERPL-MCNC: 1.41 MG/DL (ref 0.67–1.17)
EGFRCR SERPLBLD CKD-EPI 2021: 49 ML/MIN/1.73M2
ERYTHROCYTE [DISTWIDTH] IN BLOOD BY AUTOMATED COUNT: 12.6 % (ref 10–15)
GLUCOSE BLDC GLUCOMTR-MCNC: 185 MG/DL (ref 70–99)
GLUCOSE BLDC GLUCOMTR-MCNC: 214 MG/DL (ref 70–99)
GLUCOSE BLDC GLUCOMTR-MCNC: 219 MG/DL (ref 70–99)
GLUCOSE BLDC GLUCOMTR-MCNC: 219 MG/DL (ref 70–99)
GLUCOSE BLDC GLUCOMTR-MCNC: 302 MG/DL (ref 70–99)
GLUCOSE SERPL-MCNC: 219 MG/DL (ref 70–99)
HCO3 SERPL-SCNC: 24 MMOL/L (ref 22–29)
HCT VFR BLD AUTO: 31.4 % (ref 40–53)
HGB BLD-MCNC: 10.7 G/DL (ref 13.3–17.7)
MAGNESIUM SERPL-MCNC: 2.1 MG/DL (ref 1.7–2.3)
MCH RBC QN AUTO: 32.7 PG (ref 26.5–33)
MCHC RBC AUTO-ENTMCNC: 34.1 G/DL (ref 31.5–36.5)
MCV RBC AUTO: 96 FL (ref 78–100)
PHOSPHATE SERPL-MCNC: 3.9 MG/DL (ref 2.5–4.5)
PLATELET # BLD AUTO: 171 10E3/UL (ref 150–450)
POTASSIUM SERPL-SCNC: 4.5 MMOL/L (ref 3.4–5.3)
RBC # BLD AUTO: 3.27 10E6/UL (ref 4.4–5.9)
SODIUM SERPL-SCNC: 136 MMOL/L (ref 135–145)
WBC # BLD AUTO: 4.9 10E3/UL (ref 4–11)

## 2024-09-16 PROCEDURE — 250N000011 HC RX IP 250 OP 636: Performed by: NURSE PRACTITIONER

## 2024-09-16 PROCEDURE — 97116 GAIT TRAINING THERAPY: CPT | Mod: GP | Performed by: PHYSICAL THERAPIST

## 2024-09-16 PROCEDURE — 250N000013 HC RX MED GY IP 250 OP 250 PS 637: Performed by: NURSE PRACTITIONER

## 2024-09-16 PROCEDURE — 99024 POSTOP FOLLOW-UP VISIT: CPT

## 2024-09-16 PROCEDURE — 97110 THERAPEUTIC EXERCISES: CPT | Mod: GP | Performed by: PHYSICAL THERAPIST

## 2024-09-16 PROCEDURE — 84100 ASSAY OF PHOSPHORUS: CPT | Performed by: NURSE PRACTITIONER

## 2024-09-16 PROCEDURE — 97530 THERAPEUTIC ACTIVITIES: CPT | Mod: GP | Performed by: PHYSICAL THERAPIST

## 2024-09-16 PROCEDURE — 83735 ASSAY OF MAGNESIUM: CPT | Performed by: NURSE PRACTITIONER

## 2024-09-16 PROCEDURE — 36415 COLL VENOUS BLD VENIPUNCTURE: CPT | Performed by: NURSE PRACTITIONER

## 2024-09-16 PROCEDURE — 80048 BASIC METABOLIC PNL TOTAL CA: CPT | Performed by: NURSE PRACTITIONER

## 2024-09-16 PROCEDURE — 85027 COMPLETE CBC AUTOMATED: CPT | Performed by: NURSE PRACTITIONER

## 2024-09-16 PROCEDURE — 120N000002 HC R&B MED SURG/OB UMMC

## 2024-09-16 RX ADMIN — METHOCARBAMOL 500 MG: 500 TABLET ORAL at 20:30

## 2024-09-16 RX ADMIN — FAMOTIDINE 20 MG: 20 TABLET ORAL at 07:47

## 2024-09-16 RX ADMIN — INSULIN ASPART 5 UNITS: 100 INJECTION, SOLUTION INTRAVENOUS; SUBCUTANEOUS at 18:37

## 2024-09-16 RX ADMIN — GABAPENTIN 300 MG: 300 CAPSULE ORAL at 07:47

## 2024-09-16 RX ADMIN — LIDOCAINE 2 PATCH: 560 PATCH PERCUTANEOUS; TOPICAL; TRANSDERMAL at 07:49

## 2024-09-16 RX ADMIN — SENNOSIDES AND DOCUSATE SODIUM 2 TABLET: 8.6; 5 TABLET ORAL at 07:48

## 2024-09-16 RX ADMIN — AMITRIPTYLINE HYDROCHLORIDE 10 MG: 10 TABLET, FILM COATED ORAL at 23:03

## 2024-09-16 RX ADMIN — INSULIN ASPART 1.7 UNITS: 100 INJECTION, SOLUTION INTRAVENOUS; SUBCUTANEOUS at 12:45

## 2024-09-16 RX ADMIN — GABAPENTIN 300 MG: 300 CAPSULE ORAL at 14:42

## 2024-09-16 RX ADMIN — ACETAMINOPHEN 650 MG: 325 TABLET ORAL at 12:49

## 2024-09-16 RX ADMIN — HEPARIN SODIUM 5000 UNITS: 5000 INJECTION, SOLUTION INTRAVENOUS; SUBCUTANEOUS at 20:30

## 2024-09-16 RX ADMIN — FAMOTIDINE 20 MG: 20 TABLET ORAL at 20:30

## 2024-09-16 RX ADMIN — METHOCARBAMOL 500 MG: 500 TABLET ORAL at 07:48

## 2024-09-16 RX ADMIN — METHOCARBAMOL 500 MG: 500 TABLET ORAL at 15:51

## 2024-09-16 RX ADMIN — HEPARIN SODIUM 5000 UNITS: 5000 INJECTION, SOLUTION INTRAVENOUS; SUBCUTANEOUS at 04:43

## 2024-09-16 RX ADMIN — Medication 1 TABLET: at 07:48

## 2024-09-16 RX ADMIN — Medication 25 MCG: at 07:48

## 2024-09-16 RX ADMIN — ACETAMINOPHEN 650 MG: 325 TABLET ORAL at 02:39

## 2024-09-16 RX ADMIN — GABAPENTIN 300 MG: 300 CAPSULE ORAL at 20:30

## 2024-09-16 RX ADMIN — INSULIN ASPART 1 UNITS: 100 INJECTION, SOLUTION INTRAVENOUS; SUBCUTANEOUS at 08:06

## 2024-09-16 RX ADMIN — SIMVASTATIN 40 MG: 40 TABLET, FILM COATED ORAL at 23:04

## 2024-09-16 RX ADMIN — METHOCARBAMOL 500 MG: 500 TABLET ORAL at 12:49

## 2024-09-16 RX ADMIN — INSULIN GLARGINE 8 UNITS: 100 INJECTION, SOLUTION SUBCUTANEOUS at 08:04

## 2024-09-16 RX ADMIN — HEPARIN SODIUM 5000 UNITS: 5000 INJECTION, SOLUTION INTRAVENOUS; SUBCUTANEOUS at 12:42

## 2024-09-16 RX ADMIN — OXYCODONE HYDROCHLORIDE 2.5 MG: 5 TABLET ORAL at 00:17

## 2024-09-16 RX ADMIN — ACETAMINOPHEN 650 MG: 325 TABLET ORAL at 07:48

## 2024-09-16 ASSESSMENT — ACTIVITIES OF DAILY LIVING (ADL)
ADLS_ACUITY_SCORE: 29
ADLS_ACUITY_SCORE: 29
ADLS_ACUITY_SCORE: 30
ADLS_ACUITY_SCORE: 29
ADLS_ACUITY_SCORE: 29
ADLS_ACUITY_SCORE: 30
ADLS_ACUITY_SCORE: 29
ADLS_ACUITY_SCORE: 30
ADLS_ACUITY_SCORE: 29
ADLS_ACUITY_SCORE: 29
ADLS_ACUITY_SCORE: 30

## 2024-09-16 ASSESSMENT — VISUAL ACUITY: OU: BASELINE

## 2024-09-16 NOTE — PROGRESS NOTES
Care Management Follow Up    Length of Stay (days): 11    Expected Discharge Date: 09/17/2024     Concerns to be Addressed:  Discharge planning    Patient plan of care discussed at interdisciplinary rounds: Yes    Anticipated Discharge Disposition:    Occupational and Physical Therapy are recommending acute rehab placement     Anticipated Discharge Services:   Occupational and Physical Therapy are recommending acute rehab placement  Anticipated Discharge DME:      Patient/family educated on Medicare website which has current facility and service quality ratings:  Yes  Education Provided on the Discharge Plan:  Yes  Patient/Family in Agreement with the Plan:  Yes    Referrals Placed by CM/SW:    Post acute care facility's  Private pay costs discussed: Not applicable at this time    Discussed  Partnership in Safe Discharge Planning  document with patient/family: No     Handoff Completed:  To be completed at time of d/c    Additional Information:  ANA is following pt for discharge planning.   Occupational and Physical Therapy are recommending Acute rehab placement   Patient is zero percent service connected as per [facility] nursing home contract coverage Sujatha [email or web address].LUCAS confirms readiness for discharge.    ANA received a am call from [facility] Referral  Millie Leon ([phone]) stating that while pt transferred to [facility] from the acute rehab unit at the [facility], pt was not on the unit for ARU, pt was on the acute rehab unit as a medicine overflow pt.   Millie indicated that a decision regarding acceptance to the [facility] acute rehab unit will be made today.   ANA received a call from JOSH Levy Case Manager on the acute rehab unit at the [facility]. Mildred states that her provider reviewed pt.  Mildred stated that her provider agrees that pt is not at baseline.  Mildred stated that pt does not meet criteria for acute rehab at the [facility] as pt does not have a TBI or spinal cord injury diagnosis.  ANA phoned  "Admissions at [facility] ([place]) and referred pt for ARU.   [place] asked whether or not the VA will pay for pt's ARU stay.  ANA informed Narberth that SW would check into this however, pt also has medicare and is ready for discharge.  ANA phoned the [facility] UR Dept of Community Care Dept and left a message for JOSH Aguirre to call in regards to if the VA will pay for  pt's ARU stay in a non VA contracted ARU as the VA is paying for pt's current hospitalization.  ANA placed a follow up call to JOSH Levy Case Manager on the acute rehab unit at the [facility] who states that her MD concluded that placement at U is the most appropriate location for pt and for this reason, they would not cover an ARU stay at a non contacted [facility] site.   ANA phoned [facility] ([place]) and relayed this information.  ANA asked [place] to let this SW know whether or not they would accept pt for admit (bill pt's Medicare).  ANA then checked the \"Media\" tab of Epic to check for a scanned in copy of  pt's Medicare Care (which was not present) and noted that pt has a \"Hospital to Hospital Transfer Agreement in place with the [facility].  ANA updated the 6A RNCC.    Next Steps:  Continue to arrange for discharge from [facility] as pt is and has been medically ready for discharge.    NANI Taveras  Social Work, 6A  Phone:  [phone]  Pager:  [phone]  9/16/2024       ERIC Moran     "

## 2024-09-16 NOTE — PROGRESS NOTES
"[facility], [place]   Neurosurgery Progress Note:    Date of service: 9/16/2024    Assessment: Judah Leon is a 84 year old male  s/p Thoracic 7-11 Minimally Invasive Percutaneous Posterior instrumented fusion with Dr. Salas Guadalupe on 9/9/2024    Clinically Significant Risk Factors        # DMII: A1C = 8.2 % (Ref range: <5.7 %) within past 6 months   # Overweight: Estimated body mass index is 25.27 kg/m  as calculated from the following:    Height as of this encounter: 1.702 m (5' 7.01\").    Weight as of this encounter: 73.2 kg (161 lb 6 oz).       # Acute Kidney Injury, resolved   # CKD stage 3  # Metabolic encephalopathy, resolved     Plan:  - Neuro checks/vital signs: Q4 hours  - Pain control: PO oxycodone, tylenol, robaxin and gabapentin   - Activity: up with assist   - Restrictions/Bracing: TLSO when out of bed   - Diet: regular   - DVT prophylaxis: SCDs, subcutaneous heparin   - PT/OT: ARU  - Appreciate Endocrinology assistance regarding blood glucose control  - Bowel regimen    Wound Cares:  Please cleanse wound daily with betadine and replace dressing daily.  Please keep wound covered at all times except when changing dressing until you are seen in clinic by Dr. Salas Guadalupe.  It is ok to remove and replaced soiled dressings.  Please continue bed baths and do not shower until follow-up in clinic with Dr. Guadalupe.      Sherice Penn PA-C  Neurosurgery Department  Pager: [phone]        Interval History:  Patient reports back pain is controlled.  No acute events overnight. [facility]    Objective:   Temp:  [97.3  F (36.3  C)-98.2  F (36.8  C)] 97.3  F (36.3  C)  Pulse:  [73-84] 73  Resp:  [16-18] 16  BP: (115-139)/(74-82) 139/80  SpO2:  [96 %-98 %] 96 %  I/O last 3 completed shifts:  In: 200 [P.O.:200]  Out: 550 [Urine:550]    Gen: Appears comfortable, NAD  Wound: Incision, clean, dry, intact without strikethrough  Neurologic:  - Alert & Oriented to person, place, time, " and situation  - Follows commands briskly  - Speech fluent, spontaneous. No aphasia or dysarthria.  - No gaze preference. No apparent hemineglect.  - PERRL, EOMI  - Strong eye closure, jaw clench, and cheek puff  - Face symmetric with sensation intact to light touch  - Palate elevates symmetrically, uvula midline, tongue protrudes midline  - Trapezii and sternocleidomastoid muscles 5/5 bilaterally  - No pronator drift     Del Tr Bi WE WF Gr   R 5 5 5 5 5 5   L 5 5 5 5 5 5    HF KE KF DF PF EHL   R 5 5 5 5 5 5   L 5 5 5 5 5 5     Reflexes 2+ throughout    Sensation intact and symmetric to light touch throughout    LABS  Recent Labs   Lab Test 09/16/24  0637 09/15/24  0711 09/14/24  0636   WBC 4.9 5.8 5.1   HGB 10.7* 11.1* 10.2*   MCV 96 95 95    166 160       Recent Labs   Lab Test 09/16/24  0637 09/16/24  0234 09/15/24  2159 09/15/24  0841 09/15/24  0711 09/14/24  0954 09/14/24  0636     --   --   --  137  --  137   POTASSIUM 4.5  --   --   --  4.5  --  4.3   CHLORIDE 102  --   --   --  103  --  104   CO2 24  --   --   --  24  --  22   BUN 33.1*  --   --   --  33.0*  --  28.7*   CR 1.41*  --   --   --  1.47*  --  1.34*   ANIONGAP 10  --   --   --  10  --  11   ROB 9.4  --   --   --  9.3  --  8.9   * 219* 259*   < > 200*   < > 197*    < > = values in this interval not displayed.

## 2024-09-16 NOTE — PLAN OF CARE
Status: S/P T7-11 percutaneous Posterior instrumented fusion for unstable T8-9 fracture on 9/9. Off insulin drip as of 9/11. Hx of CkD, prostate Ca, and DMII.   Vitals: VSS  Neuros: A&Ox4. Forgetful. R Bottom eyelid red, vision intact. Numbness to feet baseline.   IV: PIV SL  Labs/Electrolytes: BG checks ACHS  Resp/trach: RA  Diet: regular diet w/ carb coverage  Bowel status: LBM 9/15  : voids spont to BR or urinal  Skin: back incision covered w Primapore CDI  Pain: back/rib pain managed w/ scheduled meds, PRN tylenol, and lidocaine patches  Activity: A1 GB/walker, TLSO when OOB. Walked around unit this afternoon  Plan&updates: awaiting ARU placement, follow POC    Goal Outcome Evaluation:      Plan of Care Reviewed With: patient    Overall Patient Progress: no changeOverall Patient Progress: no change

## 2024-09-16 NOTE — PLAN OF CARE
"Goal Outcome Evaluation:      Plan of Care Reviewed With: patient    Overall Patient Progress: no changeOverall Patient Progress: no change    ./82 (BP Location: Left arm)   Pulse 84   Temp 97.6  F (36.4  C) (Oral)   Resp 17   Ht 1.702 m (5' 7.01\")   Wt 73.2 kg (161 lb 6 oz)   SpO2 97%   BMI 25.27 kg/m      Pt alert and oriented. Forgetful at times. Avss on room air. Denies nausea. C/o back and upper abdominal pain 4-7/10, gave prn 2.5mg Oxycodone x1 and Tylenol 650mg x1. 0200 blood glucose 219, covered per sliding scale. Using urinal in bed. TLSO brace at bedside. Piv saline locked. Cont with poc        Problem: Adult Inpatient Plan of Care  Goal: Plan of Care Review  Description: The Plan of Care Review/Shift note should be completed every shift.  The Outcome Evaluation is a brief statement about your assessment that the patient is improving, declining, or no change.  This information will be displayed automatically on your shift  note.  Outcome: Progressing  Flowsheets (Taken 9/16/2024 3902)  Plan of Care Reviewed With: patient  Overall Patient Progress: no change    Problem: Pain Acute  Goal: Optimal Pain Control and Function  Outcome: Progressing        "

## 2024-09-16 NOTE — PLAN OF CARE
Vitals: VSS on RA  Neuros: Alert and oriented x4, forgetful. Numbness to bilateral feet.  IV: PIV SL'd  Labs/Electrolytes: K, MG, Phos WNL, recheck in am.  Resp/trach: WNL on RA  Diet: Regular diet with good intake. Carb coverage with meals.  Bowel status: BS+x4, last BM 9/15  : Voiding  Skin: Bruising throughout. Dressing to back changed per orders, dressing CDI.  Pain: Utilizing scheduled robaxin, gabapentin and prn tylenol for pain in back. Lidocaine patches to bilateral back.  Activity: Up with assist of 1, GB and walker. TLSO when OOB. Ambulated in hallway x2. Up in chair this shift.  Social: Wife at bedside and supportive.  Plan: Plan to discharge to ARU when available.

## 2024-09-16 NOTE — PROGRESS NOTES
"Care Management Follow Up    Length of Stay (days): 11    Expected Discharge Date: 09/17/2024     Concerns to be Addressed:  discharge planning  Patient plan of care discussed at interdisciplinary rounds: Yes    Anticipated Discharge Disposition:  ARU vs return hospital transfer  Anticipated Discharge Services:  ARU vs return hospital transfer  Anticipated Discharge DME:  NA    Patient/family educated on Medicare website which has current facility and service quality ratings:  not today  Education Provided on the Discharge Plan:  not today  Patient/Family in Agreement with the Plan:  LEÓN    Referrals Placed by CM/SW:  NA  Private pay costs discussed: Not applicable    Discussed  Partnership in Safe Discharge Planning  document with patient/family: Not at this time    Handoff Completed: No, handoff not indicated or clinically appropriate at this time    Additional Information:  Informed by unit SW that patient has a return transfer agreement in place with the [facility].     [facility] ([facility] [phone]), spoke with Baudilio. Baudilio states they do not have any med-surg beds available today and are \"boarding like crazy in the ER.\" Baudilio states we can try back tomorrow.     Next Steps:   RNCC will continue to follow. Will continue to pursue return hospital transfer to the [facility] to facilitate a timely discharge from [facility].     [facility] - return transfer agreement in place  Transfer Center: [phone]    Monae Palacios, RN, BSN  6A RN Care Coordinator  Ph: [phone]   [place]: 6A Neuro RNCC  "

## 2024-09-17 ENCOUNTER — APPOINTMENT (OUTPATIENT)
Dept: PHYSICAL THERAPY | Facility: CLINIC | Age: 84
DRG: 459 | End: 2024-09-17
Attending: NEUROLOGICAL SURGERY
Payer: COMMERCIAL

## 2024-09-17 ENCOUNTER — APPOINTMENT (OUTPATIENT)
Dept: OCCUPATIONAL THERAPY | Facility: CLINIC | Age: 84
DRG: 459 | End: 2024-09-17
Attending: NEUROLOGICAL SURGERY
Payer: COMMERCIAL

## 2024-09-17 LAB
ANION GAP SERPL CALCULATED.3IONS-SCNC: 9 MMOL/L (ref 7–15)
BUN SERPL-MCNC: 33.2 MG/DL (ref 8–23)
CALCIUM SERPL-MCNC: 9.5 MG/DL (ref 8.8–10.4)
CHLORIDE SERPL-SCNC: 103 MMOL/L (ref 98–107)
CREAT SERPL-MCNC: 1.42 MG/DL (ref 0.67–1.17)
EGFRCR SERPLBLD CKD-EPI 2021: 48 ML/MIN/1.73M2
ERYTHROCYTE [DISTWIDTH] IN BLOOD BY AUTOMATED COUNT: 12.6 % (ref 10–15)
GLUCOSE BLDC GLUCOMTR-MCNC: 213 MG/DL (ref 70–99)
GLUCOSE BLDC GLUCOMTR-MCNC: 227 MG/DL (ref 70–99)
GLUCOSE BLDC GLUCOMTR-MCNC: 231 MG/DL (ref 70–99)
GLUCOSE BLDC GLUCOMTR-MCNC: 246 MG/DL (ref 70–99)
GLUCOSE BLDC GLUCOMTR-MCNC: 250 MG/DL (ref 70–99)
GLUCOSE BLDC GLUCOMTR-MCNC: 258 MG/DL (ref 70–99)
GLUCOSE BLDC GLUCOMTR-MCNC: 303 MG/DL (ref 70–99)
GLUCOSE SERPL-MCNC: 250 MG/DL (ref 70–99)
HCO3 SERPL-SCNC: 24 MMOL/L (ref 22–29)
HCT VFR BLD AUTO: 30.6 % (ref 40–53)
HGB BLD-MCNC: 10.5 G/DL (ref 13.3–17.7)
MAGNESIUM SERPL-MCNC: 2 MG/DL (ref 1.7–2.3)
MCH RBC QN AUTO: 32.9 PG (ref 26.5–33)
MCHC RBC AUTO-ENTMCNC: 34.3 G/DL (ref 31.5–36.5)
MCV RBC AUTO: 96 FL (ref 78–100)
PHOSPHATE SERPL-MCNC: 3.7 MG/DL (ref 2.5–4.5)
PLATELET # BLD AUTO: 184 10E3/UL (ref 150–450)
POTASSIUM SERPL-SCNC: 4.4 MMOL/L (ref 3.4–5.3)
RBC # BLD AUTO: 3.19 10E6/UL (ref 4.4–5.9)
SODIUM SERPL-SCNC: 136 MMOL/L (ref 135–145)
WBC # BLD AUTO: 5.9 10E3/UL (ref 4–11)

## 2024-09-17 PROCEDURE — 97530 THERAPEUTIC ACTIVITIES: CPT | Mod: GO

## 2024-09-17 PROCEDURE — 80048 BASIC METABOLIC PNL TOTAL CA: CPT | Performed by: NURSE PRACTITIONER

## 2024-09-17 PROCEDURE — 97530 THERAPEUTIC ACTIVITIES: CPT | Mod: GP

## 2024-09-17 PROCEDURE — 250N000013 HC RX MED GY IP 250 OP 250 PS 637: Performed by: NURSE PRACTITIONER

## 2024-09-17 PROCEDURE — 250N000011 HC RX IP 250 OP 636: Performed by: NURSE PRACTITIONER

## 2024-09-17 PROCEDURE — 120N000002 HC R&B MED SURG/OB UMMC

## 2024-09-17 PROCEDURE — 97116 GAIT TRAINING THERAPY: CPT | Mod: GP

## 2024-09-17 PROCEDURE — 97535 SELF CARE MNGMENT TRAINING: CPT | Mod: GO

## 2024-09-17 PROCEDURE — 36415 COLL VENOUS BLD VENIPUNCTURE: CPT | Performed by: NURSE PRACTITIONER

## 2024-09-17 PROCEDURE — 84100 ASSAY OF PHOSPHORUS: CPT | Performed by: NURSE PRACTITIONER

## 2024-09-17 PROCEDURE — 83735 ASSAY OF MAGNESIUM: CPT | Performed by: NURSE PRACTITIONER

## 2024-09-17 PROCEDURE — 250N000013 HC RX MED GY IP 250 OP 250 PS 637: Performed by: NEUROLOGICAL SURGERY

## 2024-09-17 PROCEDURE — 85027 COMPLETE CBC AUTOMATED: CPT | Performed by: NURSE PRACTITIONER

## 2024-09-17 RX ORDER — MAGNESIUM OXIDE 400 MG/1
400 TABLET ORAL EVERY 4 HOURS
Status: COMPLETED | OUTPATIENT
Start: 2024-09-17 | End: 2024-09-17

## 2024-09-17 RX ADMIN — FAMOTIDINE 20 MG: 20 TABLET ORAL at 21:11

## 2024-09-17 RX ADMIN — HEPARIN SODIUM 5000 UNITS: 5000 INJECTION, SOLUTION INTRAVENOUS; SUBCUTANEOUS at 21:11

## 2024-09-17 RX ADMIN — INSULIN ASPART 3 UNITS: 100 INJECTION, SOLUTION INTRAVENOUS; SUBCUTANEOUS at 19:00

## 2024-09-17 RX ADMIN — HEPARIN SODIUM 5000 UNITS: 5000 INJECTION, SOLUTION INTRAVENOUS; SUBCUTANEOUS at 03:35

## 2024-09-17 RX ADMIN — METHOCARBAMOL 500 MG: 500 TABLET ORAL at 16:12

## 2024-09-17 RX ADMIN — MAGNESIUM OXIDE TAB 400 MG (241.3 MG ELEMENTAL MG) 400 MG: 400 (241.3 MG) TAB at 08:15

## 2024-09-17 RX ADMIN — METHOCARBAMOL 500 MG: 500 TABLET ORAL at 21:11

## 2024-09-17 RX ADMIN — SENNOSIDES AND DOCUSATE SODIUM 2 TABLET: 8.6; 5 TABLET ORAL at 08:16

## 2024-09-17 RX ADMIN — HEPARIN SODIUM 5000 UNITS: 5000 INJECTION, SOLUTION INTRAVENOUS; SUBCUTANEOUS at 12:41

## 2024-09-17 RX ADMIN — SIMVASTATIN 40 MG: 40 TABLET, FILM COATED ORAL at 21:11

## 2024-09-17 RX ADMIN — INSULIN ASPART 1 UNITS: 100 INJECTION, SOLUTION INTRAVENOUS; SUBCUTANEOUS at 12:42

## 2024-09-17 RX ADMIN — SENNOSIDES AND DOCUSATE SODIUM 2 TABLET: 8.6; 5 TABLET ORAL at 21:11

## 2024-09-17 RX ADMIN — MAGNESIUM OXIDE TAB 400 MG (241.3 MG ELEMENTAL MG) 400 MG: 400 (241.3 MG) TAB at 12:41

## 2024-09-17 RX ADMIN — ACETAMINOPHEN 650 MG: 325 TABLET ORAL at 12:41

## 2024-09-17 RX ADMIN — GABAPENTIN 300 MG: 300 CAPSULE ORAL at 08:16

## 2024-09-17 RX ADMIN — Medication 1 TABLET: at 08:14

## 2024-09-17 RX ADMIN — METHOCARBAMOL 500 MG: 500 TABLET ORAL at 08:15

## 2024-09-17 RX ADMIN — AMITRIPTYLINE HYDROCHLORIDE 10 MG: 10 TABLET, FILM COATED ORAL at 21:11

## 2024-09-17 RX ADMIN — GABAPENTIN 300 MG: 300 CAPSULE ORAL at 21:11

## 2024-09-17 RX ADMIN — Medication 25 MCG: at 08:15

## 2024-09-17 RX ADMIN — METFORMIN ER 500 MG 500 MG: 500 TABLET ORAL at 17:19

## 2024-09-17 RX ADMIN — FAMOTIDINE 20 MG: 20 TABLET ORAL at 08:15

## 2024-09-17 RX ADMIN — METHOCARBAMOL 500 MG: 500 TABLET ORAL at 12:41

## 2024-09-17 RX ADMIN — INSULIN ASPART 1 UNITS: 100 INJECTION, SOLUTION INTRAVENOUS; SUBCUTANEOUS at 08:17

## 2024-09-17 RX ADMIN — LIDOCAINE 2 PATCH: 560 PATCH PERCUTANEOUS; TOPICAL; TRANSDERMAL at 09:56

## 2024-09-17 RX ADMIN — POLYETHYLENE GLYCOL 3350 17 G: 17 POWDER, FOR SOLUTION ORAL at 21:11

## 2024-09-17 RX ADMIN — INSULIN GLARGINE 8 UNITS: 100 INJECTION, SOLUTION SUBCUTANEOUS at 08:17

## 2024-09-17 RX ADMIN — GABAPENTIN 300 MG: 300 CAPSULE ORAL at 14:40

## 2024-09-17 ASSESSMENT — ACTIVITIES OF DAILY LIVING (ADL)
ADLS_ACUITY_SCORE: 30
ADLS_ACUITY_SCORE: 33
ADLS_ACUITY_SCORE: 35
ADLS_ACUITY_SCORE: 33
ADLS_ACUITY_SCORE: 30
ADLS_ACUITY_SCORE: 30
ADLS_ACUITY_SCORE: 35
ADLS_ACUITY_SCORE: 33
ADLS_ACUITY_SCORE: 35
ADLS_ACUITY_SCORE: 30
ADLS_ACUITY_SCORE: 35
ADLS_ACUITY_SCORE: 33
ADLS_ACUITY_SCORE: 30
ADLS_ACUITY_SCORE: 30
ADLS_ACUITY_SCORE: 35
ADLS_ACUITY_SCORE: 30
ADLS_ACUITY_SCORE: 33
ADLS_ACUITY_SCORE: 30
ADLS_ACUITY_SCORE: 35
ADLS_ACUITY_SCORE: 33
ADLS_ACUITY_SCORE: 33
ADLS_ACUITY_SCORE: 30
ADLS_ACUITY_SCORE: 35

## 2024-09-17 NOTE — PROGRESS NOTES
"Care Management Follow Up    Length of Stay (days): 12    Expected Discharge Date: 09/17/2024     Concerns to be Addressed:  discharge planning  Patient plan of care discussed at interdisciplinary rounds: Yes     Anticipated Discharge Disposition:  ARU vs return hospital transfer  Anticipated Discharge Services:  ARU vs return hospital transfer  Anticipated Discharge DME:  NA     Patient/family educated on Medicare website which has current facility and service quality ratings:  not today  Education Provided on the Discharge Plan:  not today  Patient/Family in Agreement with the Plan:  LEÓN     Referrals Placed by CM/SW:  NA  Private pay costs discussed: Not applicable     Discussed  Partnership in Safe Discharge Planning  document with patient/family: Not at this time     Handoff Completed: No, handoff not indicated or clinically appropriate at this time    Additional Information:  [facility] (ph: [phone]), spoke with Hailey. Hailey states they do not have any med-surg beds available today and are \"boarding 20 people in the ED.\" Hailey states we can try back tomorrow.     Next Steps:   RNCC will continue to follow. Will continue to pursue return hospital transfer to the [facility] while ANA pursues rehab placement to facilitate a timely discharge from [facility].      [facility] - return transfer agreement in place  Transfer Center: [phone]    Monae Palacios, RN, BSN  6A RN Care Coordinator  Ph: [phone]   [place]oyd: 6A Neuro RNCC  "

## 2024-09-17 NOTE — PLAN OF CARE
Status: S/P T7-11 percutaneous Posterior instrumented fusion for unstable T8-9 fracture on 9/9. Off insulin drip as of 9/11. Hx of CkD, prostate Ca, and DMII.   Vitals: VSS  Neuros: A&Ox4. Forgetful. Numbness to feet baseline.   IV: PIV SL  Labs/Electrolytes: BG checks ACHS  Resp/trach: RA  Diet: regular diet w/ carb coverage.   Bowel status: BM 9/16  : voids spont to BR or urinal  Skin: back incision covered w Primapore CDI  Pain: denies at this time  Activity: A1 GB/walker, TLSO when OOB. Bed alarm on  Plan&updates: discharge to ARU when bed available

## 2024-09-17 NOTE — PROGRESS NOTES
"[facility], [place]   Neurosurgery Progress Note:    Date of service: 9/17/2024    Assessment: Judah Leon is a 84 year old male  s/p Thoracic 7-11 Minimally Invasive Percutaneous Posterior instrumented fusion with Dr. Salas Guadalupe on 9/9/2024    Clinically Significant Risk Factors        # DMII: A1C = 8.2 % (Ref range: <5.7 %) within past 6 months   # Overweight: Estimated body mass index is 25.27 kg/m  as calculated from the following:    Height as of this encounter: 1.702 m (5' 7.01\").    Weight as of this encounter: 73.2 kg (161 lb 6 oz).       # Acute Kidney Injury, resolved   # CKD stage 3  # Metabolic encephalopathy, resolved     Plan:  - Serial Neuro exams  - Pain control: PO oxycodone, tylenol, robaxin and gabapentin   - Activity: up with assist   - Restrictions/Bracing: TLSO when out of bed   - Diet: regular   - DVT prophylaxis: SCDs, subcutaneous heparin   - PT/OT: ARU  - Appreciate Endocrinology assistance regarding blood glucose control  - Bowel regimen    Wound Cares:  Please cleanse wound daily with betadine and replace dressing daily.  Please keep wound covered at all times except when changing dressing until you are seen in clinic by Dr. Salas Guadalupe.  It is ok to remove and replaced soiled dressings.  Please continue bed baths and do not shower until follow-up in clinic with Dr. Guadalupe.      ASH Gray, CNP  Neurosurgery  Pager [phone]      Interval History:  No acute events overnight. [facility]    Objective:   Temp:  [97  F (36.1  C)-97.8  F (36.6  C)] 97.8  F (36.6  C)  Pulse:  [67-72] 67  Resp:  [14-16] 16  BP: (123-151)/(70-79) 128/79  SpO2:  [95 %-99 %] 99 %  I/O last 3 completed shifts:  In: 583 [P.O.:580; I.V.:3]  Out: 500 [Urine:500]    Gen: Appears comfortable, NAD  Wound: Incision, clean, dry, intact without strikethrough  Neurologic:  - Alert & Oriented to person, place, time, and situation  - Follows commands briskly  - Speech fluent, " spontaneous. No aphasia or dysarthria.  - No gaze preference. No apparent hemineglect.  - PERRL, EOMI  - Strong eye closure, jaw clench, and cheek puff  - Face symmetric with sensation intact to light touch  - Palate elevates symmetrically, uvula midline, tongue protrudes midline  - Trapezii and sternocleidomastoid muscles 5/5 bilaterally  - No pronator drift     Del Tr Bi WE WF Gr   R 5 5 5 5 5 5   L 5 5 5 5 5 5    HF KE KF DF PF EHL   R 5 5 5 5 5 5   L 5 5 5 5 5 5     Reflexes 2+ throughout    Sensation intact and symmetric to light touch throughout    LABS  Recent Labs   Lab Test 09/17/24  0558 09/16/24  0637 09/15/24  0711   WBC 5.9 4.9 5.8   HGB 10.5* 10.7* 11.1*   MCV 96 96 95    171 166       Recent Labs   Lab Test 09/17/24  0802 09/17/24  0558 09/17/24  0315 09/16/24  0754 09/16/24  0637 09/15/24  0841 09/15/24  0711   NA  --  136  --   --  136  --  137   POTASSIUM  --  4.4  --   --  4.5  --  4.5   CHLORIDE  --  103  --   --  102  --  103   CO2  --  24  --   --  24  --  24   BUN  --  33.2*  --   --  33.1*  --  33.0*   CR  --  1.42*  --   --  1.41*  --  1.47*   ANIONGAP  --  9  --   --  10  --  10   ROB  --  9.5  --   --  9.4  --  9.3   * 250* 303*   < > 219*   < > 200*    < > = values in this interval not displayed.

## 2024-09-17 NOTE — PROGRESS NOTES
"Care Management Follow Up    Length of Stay (days): 12    Expected Discharge Date: 09/18/2024     Concerns to be Addressed:  Discharge planning    Patient plan of care discussed at interdisciplinary rounds: Yes    Anticipated Discharge Disposition:  Occupational and Physical Therapy are recommending acute rehab placement         Anticipated Discharge Services: Occupational and Physical Therapy are recommending acute rehab placement   Anticipated Discharge DME:    Not applicable at this time    Patient/family educated on Medicare website which has current facility and service quality ratings:   Yes  Education Provided on the Discharge Plan:   Yes  Patient/Family in Agreement with the Plan:  Yes    Referrals Placed by CM/SW:  Post acute care facility's  Private pay costs discussed: Not applicable at this time    Discussed  Partnership in Safe Discharge Planning  document with patient/family: No    Handoff Completed: To be completed at time of discharge    Additional Information:  ANA is following pt for discharge planning.   Occupational and Physical Therapy are recommending Acute rehab placement   Patient is zero percent service connected as per [facility].   Pt remains medically ready for discharge.    Pt was declined for admit to the [facility] ARU unit as pt does not have a TBI or spinal cord injury  diagnosis.  As this is the case, pt will need to use his Medicare benefits for his rehab stay.  [facility] ([place]) sent a \"Team\" message today indicating that pt looks appropriate for ARC. [place] stated that she would need 9/17/2024 OT progress notes and information on his discharge support to make a determination in regards to acceptance.   [place] also indicated that she needs pt's Medicare information.    OT is treating pt this afternoon.  ANA phoned pt's wife (Yolette) and updated.  Pt's wife states that she is able bodied and will provide pt's 24/7 support in the home " following pt's ARU stay.  SW asked for pt's medicare number.  Pt's wife states that she will call this SW with pt's  Medicare number when it is located.    SW met with pt and provided a discharge planning update    Next Steps: Obtain pt's medicare number (wife to call with the number), provide the medicare number to [facility] and then confirm whether or not they can accept pt for admit.    SW will continue to follow for discharge planning.    NANI Taveras  Social Work, 6A  Phone:  [phone]  Pager:  [phone]  9/17/2024       ERIC Moran

## 2024-09-17 NOTE — PLAN OF CARE
Status: S/P T7-11 percutaneous Posterior instrumented fusion for unstable T8-9 fracture on 9/9. Off insulin drip as of 9/11. Hx of CkD, prostate Ca, and DMII.   Vitals: VSS on RA   Neuros: A&O x4, forgetful and intermittently confused. Numbness to feet at baseline. Strengths 5/5 t/o with GW  IV: PIV SL   Labs/Electrolytes: Mag replaced. BG ACHS   Resp/trach: Denies SOB   Diet: Regular with carb coverage. Good intake   Bowel status: BM x1  : Voiding with urgency/retention. . Intermittent incontinence.   Skin: Back incisions CDI, dressing changed   Pain: Managed with scheduled meds and PRN tylenol   Activity: A1/GB/walker. TLSO on when OOB. Up in chair x2   Social: No visitors this shift   Plan: Continue POC. SW following for discharge

## 2024-09-18 ENCOUNTER — APPOINTMENT (OUTPATIENT)
Dept: PHYSICAL THERAPY | Facility: CLINIC | Age: 84
DRG: 459 | End: 2024-09-18
Attending: NEUROLOGICAL SURGERY
Payer: COMMERCIAL

## 2024-09-18 ENCOUNTER — APPOINTMENT (OUTPATIENT)
Dept: OCCUPATIONAL THERAPY | Facility: CLINIC | Age: 84
DRG: 459 | End: 2024-09-18
Attending: NEUROLOGICAL SURGERY
Payer: COMMERCIAL

## 2024-09-18 LAB
ANION GAP SERPL CALCULATED.3IONS-SCNC: 9 MMOL/L (ref 7–15)
BUN SERPL-MCNC: 30.7 MG/DL (ref 8–23)
CALCIUM SERPL-MCNC: 9.5 MG/DL (ref 8.8–10.4)
CHLORIDE SERPL-SCNC: 102 MMOL/L (ref 98–107)
CREAT SERPL-MCNC: 1.44 MG/DL (ref 0.67–1.17)
EGFRCR SERPLBLD CKD-EPI 2021: 47 ML/MIN/1.73M2
ERYTHROCYTE [DISTWIDTH] IN BLOOD BY AUTOMATED COUNT: 12.9 % (ref 10–15)
GLUCOSE BLDC GLUCOMTR-MCNC: 206 MG/DL (ref 70–99)
GLUCOSE BLDC GLUCOMTR-MCNC: 223 MG/DL (ref 70–99)
GLUCOSE BLDC GLUCOMTR-MCNC: 232 MG/DL (ref 70–99)
GLUCOSE BLDC GLUCOMTR-MCNC: 251 MG/DL (ref 70–99)
GLUCOSE BLDC GLUCOMTR-MCNC: 264 MG/DL (ref 70–99)
GLUCOSE SERPL-MCNC: 241 MG/DL (ref 70–99)
HCO3 SERPL-SCNC: 25 MMOL/L (ref 22–29)
HCT VFR BLD AUTO: 31.2 % (ref 40–53)
HGB BLD-MCNC: 10.2 G/DL (ref 13.3–17.7)
MAGNESIUM SERPL-MCNC: 1.8 MG/DL (ref 1.7–2.3)
MCH RBC QN AUTO: 32.4 PG (ref 26.5–33)
MCHC RBC AUTO-ENTMCNC: 32.7 G/DL (ref 31.5–36.5)
MCV RBC AUTO: 99 FL (ref 78–100)
PHOSPHATE SERPL-MCNC: 3.6 MG/DL (ref 2.5–4.5)
PLATELET # BLD AUTO: 181 10E3/UL (ref 150–450)
POTASSIUM SERPL-SCNC: 4.7 MMOL/L (ref 3.4–5.3)
RBC # BLD AUTO: 3.15 10E6/UL (ref 4.4–5.9)
SODIUM SERPL-SCNC: 136 MMOL/L (ref 135–145)
WBC # BLD AUTO: 6.1 10E3/UL (ref 4–11)

## 2024-09-18 PROCEDURE — 250N000013 HC RX MED GY IP 250 OP 250 PS 637: Performed by: NEUROLOGICAL SURGERY

## 2024-09-18 PROCEDURE — 99207 PR SC NO CHARGE VISIT/PATIENT NOT SEEN: CPT

## 2024-09-18 PROCEDURE — 84100 ASSAY OF PHOSPHORUS: CPT | Performed by: NURSE PRACTITIONER

## 2024-09-18 PROCEDURE — 97530 THERAPEUTIC ACTIVITIES: CPT | Mod: GO

## 2024-09-18 PROCEDURE — 250N000013 HC RX MED GY IP 250 OP 250 PS 637: Performed by: NURSE PRACTITIONER

## 2024-09-18 PROCEDURE — 97535 SELF CARE MNGMENT TRAINING: CPT | Mod: GO

## 2024-09-18 PROCEDURE — 120N000002 HC R&B MED SURG/OB UMMC

## 2024-09-18 PROCEDURE — 97116 GAIT TRAINING THERAPY: CPT | Mod: GP | Performed by: PHYSICAL THERAPIST

## 2024-09-18 PROCEDURE — 36415 COLL VENOUS BLD VENIPUNCTURE: CPT | Performed by: NURSE PRACTITIONER

## 2024-09-18 PROCEDURE — 250N000011 HC RX IP 250 OP 636: Performed by: NURSE PRACTITIONER

## 2024-09-18 PROCEDURE — 85014 HEMATOCRIT: CPT | Performed by: NURSE PRACTITIONER

## 2024-09-18 PROCEDURE — 80048 BASIC METABOLIC PNL TOTAL CA: CPT | Performed by: NURSE PRACTITIONER

## 2024-09-18 PROCEDURE — 82310 ASSAY OF CALCIUM: CPT | Performed by: NURSE PRACTITIONER

## 2024-09-18 PROCEDURE — 83735 ASSAY OF MAGNESIUM: CPT | Performed by: NURSE PRACTITIONER

## 2024-09-18 PROCEDURE — 97530 THERAPEUTIC ACTIVITIES: CPT | Mod: GP | Performed by: PHYSICAL THERAPIST

## 2024-09-18 RX ORDER — MAGNESIUM OXIDE 400 MG/1
400 TABLET ORAL EVERY 4 HOURS
Status: COMPLETED | OUTPATIENT
Start: 2024-09-18 | End: 2024-09-18

## 2024-09-18 RX ADMIN — MAGNESIUM OXIDE TAB 400 MG (241.3 MG ELEMENTAL MG) 400 MG: 400 (241.3 MG) TAB at 14:05

## 2024-09-18 RX ADMIN — GABAPENTIN 300 MG: 300 CAPSULE ORAL at 07:49

## 2024-09-18 RX ADMIN — AMITRIPTYLINE HYDROCHLORIDE 10 MG: 10 TABLET, FILM COATED ORAL at 21:04

## 2024-09-18 RX ADMIN — METHOCARBAMOL 500 MG: 500 TABLET ORAL at 07:49

## 2024-09-18 RX ADMIN — ACETAMINOPHEN 650 MG: 325 TABLET ORAL at 21:06

## 2024-09-18 RX ADMIN — Medication 25 MCG: at 07:50

## 2024-09-18 RX ADMIN — SENNOSIDES AND DOCUSATE SODIUM 2 TABLET: 8.6; 5 TABLET ORAL at 07:49

## 2024-09-18 RX ADMIN — ACETAMINOPHEN 650 MG: 325 TABLET ORAL at 07:49

## 2024-09-18 RX ADMIN — METHOCARBAMOL 500 MG: 500 TABLET ORAL at 12:55

## 2024-09-18 RX ADMIN — Medication 1 TABLET: at 07:49

## 2024-09-18 RX ADMIN — MAGNESIUM OXIDE TAB 400 MG (241.3 MG ELEMENTAL MG) 400 MG: 400 (241.3 MG) TAB at 10:47

## 2024-09-18 RX ADMIN — HEPARIN SODIUM 5000 UNITS: 5000 INJECTION, SOLUTION INTRAVENOUS; SUBCUTANEOUS at 16:39

## 2024-09-18 RX ADMIN — POLYETHYLENE GLYCOL 3350 17 G: 17 POWDER, FOR SOLUTION ORAL at 07:50

## 2024-09-18 RX ADMIN — FAMOTIDINE 20 MG: 20 TABLET ORAL at 21:04

## 2024-09-18 RX ADMIN — METHOCARBAMOL 500 MG: 500 TABLET ORAL at 21:04

## 2024-09-18 RX ADMIN — INSULIN GLARGINE 8 UNITS: 100 INJECTION, SOLUTION SUBCUTANEOUS at 08:15

## 2024-09-18 RX ADMIN — SIMVASTATIN 40 MG: 40 TABLET, FILM COATED ORAL at 21:04

## 2024-09-18 RX ADMIN — HEPARIN SODIUM 5000 UNITS: 5000 INJECTION, SOLUTION INTRAVENOUS; SUBCUTANEOUS at 07:47

## 2024-09-18 RX ADMIN — GABAPENTIN 300 MG: 300 CAPSULE ORAL at 14:05

## 2024-09-18 RX ADMIN — METFORMIN ER 500 MG 500 MG: 500 TABLET ORAL at 07:48

## 2024-09-18 RX ADMIN — GABAPENTIN 300 MG: 300 CAPSULE ORAL at 21:04

## 2024-09-18 RX ADMIN — INSULIN ASPART 1 UNITS: 100 INJECTION, SOLUTION INTRAVENOUS; SUBCUTANEOUS at 08:15

## 2024-09-18 RX ADMIN — OXYCODONE HYDROCHLORIDE 2.5 MG: 5 TABLET ORAL at 07:48

## 2024-09-18 RX ADMIN — METFORMIN ER 500 MG 500 MG: 500 TABLET ORAL at 18:49

## 2024-09-18 RX ADMIN — METHOCARBAMOL 500 MG: 500 TABLET ORAL at 16:39

## 2024-09-18 RX ADMIN — FAMOTIDINE 20 MG: 20 TABLET ORAL at 07:48

## 2024-09-18 RX ADMIN — ACETAMINOPHEN 650 MG: 325 TABLET ORAL at 12:54

## 2024-09-18 ASSESSMENT — ACTIVITIES OF DAILY LIVING (ADL)
ADLS_ACUITY_SCORE: 34
ADLS_ACUITY_SCORE: 34
ADLS_ACUITY_SCORE: 33
ADLS_ACUITY_SCORE: 34
ADLS_ACUITY_SCORE: 33
ADLS_ACUITY_SCORE: 34
ADLS_ACUITY_SCORE: 33
ADLS_ACUITY_SCORE: 33
ADLS_ACUITY_SCORE: 34
ADLS_ACUITY_SCORE: 33
ADLS_ACUITY_SCORE: 35
ADLS_ACUITY_SCORE: 33
ADLS_ACUITY_SCORE: 33
ADLS_ACUITY_SCORE: 34
ADLS_ACUITY_SCORE: 33

## 2024-09-18 NOTE — PLAN OF CARE
Pt s/p T7-11 percutaneous posterior instrumented fusion for unstable T8-9 fracture after sustaining a fall, vss, neuros include: see flowsheet. PIV SL, regular diet w/CHO coverage, good PO intake, voids spont, BM this morning, up AO1 w/GB/walker and TLSO brace. Pt received PRN and scheduled pain meds with relief, surgical incision care completed and new dressing placed. Pt up in chair, talking on phone independently, wife present and attentive towards patient. Plan for rehab when facility accepts him. Continue to care per orders.   Problem: Adult Inpatient Plan of Care  Goal: Absence of Hospital-Acquired Illness or Injury  Intervention: Identify and Manage Fall Risk  Recent Flowsheet Documentation  Taken 9/18/2024 0745 by Ashley Davila RN  Safety Promotion/Fall Prevention:   activity supervised   assistive device/personal items within reach   clutter free environment maintained   increased rounding and observation   increase visualization of patient   lighting adjusted   mobility aid in reach   nonskid shoes/slippers when out of bed   patient and family education   room door open   room near nurse's station   room organization consistent   safety round/check completed   supervised activity  Intervention: Prevent Skin Injury  Recent Flowsheet Documentation  Taken 9/18/2024 0745 by Ashley Davila RN  Body Position: position changed independently  Skin Protection:   adhesive use limited   protective footwear used   pulse oximeter probe site changed  Device Skin Pressure Protection:   adhesive use limited   pressure points protected  Intervention: Prevent and Manage VTE (Venous Thromboembolism) Risk  Recent Flowsheet Documentation  Taken 9/18/2024 0745 by Ashley Davila RN  VTE Prevention/Management: (pt too high fall risk and impulsive) SCDs off (sequential compression devices)  Intervention: Prevent Infection  Recent Flowsheet Documentation  Taken 9/18/2024 0745 by Ashley Davila RN  Infection Prevention:   equipment  surfaces disinfected   hand hygiene promoted   personal protective equipment utilized   single patient room provided   rest/sleep promoted   visitors restricted/screened  Goal: Optimal Comfort and Wellbeing  Intervention: Monitor Pain and Promote Comfort  Recent Flowsheet Documentation  Taken 9/18/2024 1254 by Ashley Davila RN  Pain Management Interventions:   medication (see MAR)   care clustered   pain management plan reviewed with patient/caregiver   pillow support provided   quiet environment facilitated  Taken 9/18/2024 0748 by Ashley Davila, RN  Pain Management Interventions:   medication (see MAR)   care clustered   pain management plan reviewed with patient/caregiver   pillow support provided   quiet environment facilitated   Goal Outcome Evaluation:

## 2024-09-18 NOTE — PLAN OF CARE
Status: S/P T7-11 percutaneous Posterior instrumented fusion for unstable T8-9 fracture. Hx of CkD, prostate Ca, and DMII.   Vitals: VSS on RA  Neuros: Neuro intact ex forgetful at times - some confusion upon waking up. N/T to BLE  IV: PIV SL  Labs/Electrolytes: BG checks/insulin AC/HS + 0200  Resp/trach: RA. LS clear   Diet: Regular diet with carb coverage  Bowel status: BM x1 9/17  : Voiding spont  Skin: back incision with primapore intact  Pain: Denied pain this shift   Activity: Up with 1 GB walker + TLSO when oob. Walked around unit x1 overnight.   Social: No visitors this shift  Plan: Continue with POC

## 2024-09-18 NOTE — PLAN OF CARE
Status: S/P T7-11 percutaneous Posterior instrumented fusion for unstable T8-9 fracture on 9/9. Off insulin drip as of 9/11. Hx of CkD, prostate Ca, and DMII.   Vitals: VSS on RA.   Neuros: AOX4. Forgetful and intermittently confused. Pooja n/t. 5/5 strengthen with GW.   IV: PIV SL.   Labs/Electrolytes:GB checks with insulin coverage.   Resp/trach: Pooja SOB.   Diet: Regular. Good intake. Carb coverage.   Bowel status: Small BM X1. Passing flatus. Schedule bowel meds taken.   : Voiding w/ urgency/rentention. Intermittent incontinence. Use bedside urinal X1.   Skin: Back incision covered and CDI.   Pain:Continue monitor and follow POC. SW following discharge.   Activity:AX1/GB and walker. TLSO when OOB. Declined walk. Up on chair.   Social:No visitor evening shift.   Plain:Continue monitor and follow POC. Medically ready for discharge. SW following pt for discharge

## 2024-09-18 NOTE — PROGRESS NOTES
"[facility], [place]   Neurosurgery Progress Note:    Date of service: 9/18/2024    Assessment: uJdah Leon is a 84 year old male  s/p Thoracic 7-11 Minimally Invasive Percutaneous Posterior instrumented fusion with Dr. Salas Guadalupe on 9/9/2024    Clinically Significant Risk Factors        # DMII: A1C = 8.2 % (Ref range: <5.7 %) within past 6 months   # Overweight: Estimated body mass index is 25.27 kg/m  as calculated from the following:    Height as of this encounter: 1.702 m (5' 7.01\").    Weight as of this encounter: 73.2 kg (161 lb 6 oz).       # Acute Kidney Injury, resolved   # CKD stage 3  # Metabolic encephalopathy, resolved     Plan:  - Serial Neuro exams  - Pain control: PO oxycodone, tylenol, robaxin and gabapentin   - Activity: up with assist   - Restrictions/Bracing: TLSO when out of bed   - Diet: regular   - DVT prophylaxis: SCDs, subcutaneous heparin   - PT/OT: ARU  - Appreciate Endocrinology assistance regarding blood glucose control  - Bowel regimen    Wound Cares:  Please cleanse wound daily with betadine and replace dressing daily.  Please keep wound covered at all times except when changing dressing until you are seen in clinic by Dr. Salas Guadalupe. It is ok to remove and replaced soiled dressings.  Please continue bed baths and do not shower until follow-up in clinic with Dr. Guadalupe.      ASH Gray, CNP  Neurosurgery  Pager [phone]      Interval History:  No acute events overnight. PT/OT continue to assess patient and continue to recommend ARU. Blood glucose levels remain high despite restarting metformin yesterday.  Plan to reach out to Endocrinology/Diabetes team for further recommendations.       Objective:   Temp:  [97.8  F (36.6  C)-98.4  F (36.9  C)] 98.4  F (36.9  C)  Pulse:  [80-82] 82  Resp:  [12-16] 15  BP: (126-143)/(70-76) 126/76  SpO2:  [97 %-100 %] 100 %  I/O last 3 completed shifts:  In: 420 [P.O.:410; I.V.:10]  Out: 500 [Urine:500]    Gen: Appears " comfortable, NAD  Wound: Incision, clean, dry, intact without strikethrough  Neurologic:  - Alert & Oriented to person, place, time, and situation  - Follows commands briskly  - Speech fluent, spontaneous. No aphasia or dysarthria.  - No gaze preference. No apparent hemineglect.  - PERRL, EOMI  - Strong eye closure, jaw clench, and cheek puff  - Face symmetric with sensation intact to light touch  - Palate elevates symmetrically, uvula midline, tongue protrudes midline  - Trapezii and sternocleidomastoid muscles 5/5 bilaterally  - No pronator drift     Del Tr Bi WE WF Gr   R 5 5 5 5 5 5   L 5 5 5 5 5 5    HF KE KF DF PF EHL   R 5 5 5 5 5 5   L 5 5 5 5 5 5     Reflexes 2+ throughout    Sensation intact and symmetric to light touch throughout    LABS  Recent Labs   Lab Test 09/18/24  0628 09/17/24  0558 09/16/24  0637   WBC 6.1 5.9 4.9   HGB 10.2* 10.5* 10.7*   MCV 99 96 96    184 171       Recent Labs   Lab Test 09/18/24  0745 09/18/24  0628 09/18/24  0135 09/17/24  0802 09/17/24  0558 09/16/24  0754 09/16/24  0637   NA  --  136  --   --  136  --  136   POTASSIUM  --  4.7  --   --  4.4  --  4.5   CHLORIDE  --  102  --   --  103  --  102   CO2  --  25  --   --  24  --  24   BUN  --  30.7*  --   --  33.2*  --  33.1*   CR  --  1.44*  --   --  1.42*  --  1.41*   ANIONGAP  --  9  --   --  9  --  10   ROB  --  9.5  --   --  9.5  --  9.4   * 241* 206*   < > 250*   < > 219*    < > = values in this interval not displayed.

## 2024-09-18 NOTE — PROGRESS NOTES
Care Management Follow Up    Length of Stay (days): 13    Expected Discharge Date: 09/18/2024     Concerns to be Addressed: discharge planning   Patient plan of care discussed at interdisciplinary rounds: Yes    Anticipated Discharge Disposition:  ARU  Anticipated Discharge Services:  ARU therapy services  Anticipated Discharge DME:  n/a    Patient/family educated on Medicare website which has current facility and service quality ratings:  yes  Education Provided on the Discharge Plan:  yes    ANA called pt's wife Yolette (Phone: [phone]) at 10:06am and per Yolette, she's currently at home. She called Medicare and gave information. Pt is currently on the phone with Medicare per Yolette so once she obtains Medicare info from the pt, she will call the SW back with information.    ANA sent an in basket message to Financial Counseling to see (if even possible) that they can find a pt's Medicare information.       Patient/Family in Agreement with the Plan:  yes    Referrals Placed by CM/ANA:      [facility]  [address] [place] [address]  5th floor of the building   Phone: [phone]  - 9/18: ANA received a call from [place] in admissions at 9:19am. Kenya updated the ANA about need for Medicare information and that no ARU bed is available today with pt on ARU waitlist.     Later on, ANA sent Kenya an update on Teams about above conversation with pt's wife. Once SW obtains Medicare info from her, ANA to send it to [place].      Private pay costs discussed: Not applicable    Discussed  Partnership in Safe Discharge Planning  document with patient/family: No     Handoff Completed: No, handoff not indicated or clinically appropriate    Additional Information:  See above for updates.    Next Steps: ANA to obtain Medicare information from pt's wife Yolette and update [facility] admissions.   ___________________    TORIN Montejo, SOUMYASW  6C , covering beds 6401 to 6519  Covering 6A SW beds 6210 to  6218 for primary 6A ANA Bedoya today 9/18 only.  [facility]   Phone: [phone]  6C Cards ANA Barrientos

## 2024-09-18 NOTE — PROGRESS NOTES
Brief Diabetes Note:    Person requesting consult:  Primary team requesting curbside recommendations given recent elevation of blood sugars. IDS previously saw pt and signed off 9/13/24.    HPI: Judah Leon is a 84 year old-year-old male with a notable PMHx of stage IIIb chronic kidney disease, diabetes, hypertension, history of prostate cancer, anemia, presenting with unstable T 8-9 fracture. Patient was in his usual state of health and was with his friend on Thursday. Mr. Leon saw his friend start to lose his balance, so he ran to help. Unfortunately, his friend ended up landing on top of the Mr. Leon and fractured his spine at T8-9. He denied loss of consciousness. He denies significant weakness, only endorses sharp pain that begins in his midback and radiates across his abdomen. He denies changes in sensation. He denies incontinence, bowel/bladder symptoms, and saddle anesthesia. [facility] 9/5/24 for surgical management. Now s/p Thoracic 7-11 Minimally Invasive Percutaneous Posterior instrumented fusion with Dr. Salas Guadalupe on 9/9/2024.    PTA DM Regimen: Metformin 500mg BID, Glipizide 2.5mg daily    Current inpatient DM Regimen:   -  Lantus 8 units q 24 hrs at 0900   -  Novolog Meal Coverage: 1 units per 20 g CHO, TID AC and PRN with snacks/supplements   - Novolog Correction Scale: 1:50 >140 TID AC (medium resistance), 1:100 >200 HS, 0200 (low resistance)  - Metformin XR 500mg BID    Steroids/procedures: none    Labs:         Assessment:   Type 2 Diabetes Mellitus, complicated by NPDR and nephropathy (CKD). Good control for age and comorbidities, A1c 8.2% (9/9/24).  CKD stage III    Glucose trending slightly above goal on current regimen.    Discussion with primary team: hyperglycemia appears post-prandial in nature. Restarting Metformin appropriate. Will adjust ICR and team can reach out if further adjustments are warranted.     Per ADA guidelines,  treatment goals and recommendations for older adults with DM and medically complexity is less stringent BG control/A1c for safety - targets of 110-180 mg/dL and up to 250 mg/dL reasonable.  Avoid reliance on A1c.  Glucose decisions should be based on avoidance of hypoglycemia and symptomatic hyperglycemia.     Recommendations:   -  Lantus 8 units q 24 hrs at 0900   - Change Novolog Meal Coverage: 1 units per 20 --> 15 g CHO, TID AC and PRN with snacks/supplements   - Novolog Correction Scale: 1:50 >140 TID AC (medium resistance), 1:100 >200 HS, 0200 (low resistance)  - Metformin XR 500mg BID  - BG Monitoring: TID AC, HS, 0200  - Hypoglycemia protocol  - Carb counting protocol     Discharge Planning:   Medications: Metformin 500 mg BID + glipizide 5 mg daily. Has meter, keep checking fasting AM daily.   Test Claims: none needed.   Education:  Likely none unless needing insulin at discharge.   Outpatient Follow-up: [facility] and [facility]     Inpatient Diabetes will not see this patient for a full consult. Please call back with any questions, labile blood glucose readings, changes to steroid plan, changes to nutrition plan, or need for updated glycemic control recommendations (please allow for 24-48 hours for this).     Irina Merida PA-C  Inpatient Diabetes Service  Available on [employer]     To contact Inpatient Diabetes Service:     7 AM - 5 PM: Page the Public Mobile GERA following the patient that day (see filed or incomplete progress notes/consult notes under Endocrinology)    OR if uncertain of provider assignment: page job code [phone]    5 PM - 7 AM: First call after hours is to primary service.    For urgent after-hours questions, page job code for on call fellow: [phone]

## 2024-09-18 NOTE — PROGRESS NOTES
Care Management Follow Up    Length of Stay (days): 13    Expected Discharge Date: 09/18/2024     Concerns to be Addressed:  (Discharge planning)     Patient plan of care discussed at interdisciplinary rounds: Yes    Anticipated Discharge Disposition:  Acute rehab placement vs Return to [facility]              Anticipated Discharge Services:  Acute rehab placement   Anticipated Discharge DME:  N/A      Additional Information:  Phone call to Hailey in admissions at [facility] #[phone]. Hailey reports there are no available bed available for transfer today.    Next Steps:   RNCC will follow. SW pursues ARU placement.    Anamaria Zacarias RN  6A care coordinator covering for Monae Suzanne #[phone]

## 2024-09-19 ENCOUNTER — APPOINTMENT (OUTPATIENT)
Dept: OCCUPATIONAL THERAPY | Facility: CLINIC | Age: 84
DRG: 459 | End: 2024-09-19
Attending: NEUROLOGICAL SURGERY
Payer: COMMERCIAL

## 2024-09-19 LAB
ANION GAP SERPL CALCULATED.3IONS-SCNC: 9 MMOL/L (ref 7–15)
BUN SERPL-MCNC: 33.3 MG/DL (ref 8–23)
CALCIUM SERPL-MCNC: 9.6 MG/DL (ref 8.8–10.4)
CHLORIDE SERPL-SCNC: 104 MMOL/L (ref 98–107)
CREAT SERPL-MCNC: 1.39 MG/DL (ref 0.67–1.17)
EGFRCR SERPLBLD CKD-EPI 2021: 49 ML/MIN/1.73M2
ERYTHROCYTE [DISTWIDTH] IN BLOOD BY AUTOMATED COUNT: 12.8 % (ref 10–15)
GLUCOSE BLDC GLUCOMTR-MCNC: 153 MG/DL (ref 70–99)
GLUCOSE BLDC GLUCOMTR-MCNC: 199 MG/DL (ref 70–99)
GLUCOSE BLDC GLUCOMTR-MCNC: 213 MG/DL (ref 70–99)
GLUCOSE BLDC GLUCOMTR-MCNC: 220 MG/DL (ref 70–99)
GLUCOSE BLDC GLUCOMTR-MCNC: 360 MG/DL (ref 70–99)
GLUCOSE SERPL-MCNC: 211 MG/DL (ref 70–99)
HCO3 SERPL-SCNC: 24 MMOL/L (ref 22–29)
HCT VFR BLD AUTO: 31.8 % (ref 40–53)
HGB BLD-MCNC: 10.6 G/DL (ref 13.3–17.7)
MAGNESIUM SERPL-MCNC: 1.8 MG/DL (ref 1.7–2.3)
MCH RBC QN AUTO: 32.6 PG (ref 26.5–33)
MCHC RBC AUTO-ENTMCNC: 33.3 G/DL (ref 31.5–36.5)
MCV RBC AUTO: 98 FL (ref 78–100)
PHOSPHATE SERPL-MCNC: 3.7 MG/DL (ref 2.5–4.5)
PLATELET # BLD AUTO: 192 10E3/UL (ref 150–450)
POTASSIUM SERPL-SCNC: 4.5 MMOL/L (ref 3.4–5.3)
RBC # BLD AUTO: 3.25 10E6/UL (ref 4.4–5.9)
SODIUM SERPL-SCNC: 137 MMOL/L (ref 135–145)
WBC # BLD AUTO: 6.1 10E3/UL (ref 4–11)

## 2024-09-19 PROCEDURE — 80048 BASIC METABOLIC PNL TOTAL CA: CPT | Performed by: NURSE PRACTITIONER

## 2024-09-19 PROCEDURE — 36415 COLL VENOUS BLD VENIPUNCTURE: CPT | Performed by: NURSE PRACTITIONER

## 2024-09-19 PROCEDURE — 97535 SELF CARE MNGMENT TRAINING: CPT | Mod: GO | Performed by: OCCUPATIONAL THERAPIST

## 2024-09-19 PROCEDURE — 250N000013 HC RX MED GY IP 250 OP 250 PS 637: Performed by: NURSE PRACTITIONER

## 2024-09-19 PROCEDURE — 250N000013 HC RX MED GY IP 250 OP 250 PS 637: Performed by: NEUROLOGICAL SURGERY

## 2024-09-19 PROCEDURE — 250N000011 HC RX IP 250 OP 636: Performed by: NURSE PRACTITIONER

## 2024-09-19 PROCEDURE — 84100 ASSAY OF PHOSPHORUS: CPT | Performed by: NURSE PRACTITIONER

## 2024-09-19 PROCEDURE — 97530 THERAPEUTIC ACTIVITIES: CPT | Mod: GO | Performed by: OCCUPATIONAL THERAPIST

## 2024-09-19 PROCEDURE — 120N000002 HC R&B MED SURG/OB UMMC

## 2024-09-19 PROCEDURE — 85027 COMPLETE CBC AUTOMATED: CPT | Performed by: NURSE PRACTITIONER

## 2024-09-19 PROCEDURE — 83735 ASSAY OF MAGNESIUM: CPT | Performed by: NURSE PRACTITIONER

## 2024-09-19 RX ORDER — GLIPIZIDE 5 MG/1
5 TABLET ORAL DAILY
COMMUNITY
Start: 2024-09-19 | End: 2024-09-20

## 2024-09-19 RX ORDER — VITAMIN B COMPLEX
1 TABLET ORAL DAILY
COMMUNITY
Start: 2024-09-19

## 2024-09-19 RX ORDER — AMITRIPTYLINE HYDROCHLORIDE 10 MG/1
10 TABLET ORAL EVERY EVENING
COMMUNITY
Start: 2024-09-19

## 2024-09-19 RX ORDER — MULTIPLE VITAMINS W/ MINERALS TAB 9MG-400MCG
1 TAB ORAL DAILY
COMMUNITY
Start: 2024-09-19

## 2024-09-19 RX ORDER — SIMVASTATIN 40 MG
40 TABLET ORAL EVERY EVENING
COMMUNITY
Start: 2024-09-19

## 2024-09-19 RX ORDER — DEXTROSE MONOHYDRATE 25 G/50ML
25-50 INJECTION, SOLUTION INTRAVENOUS
Status: DISCONTINUED | OUTPATIENT
Start: 2024-09-19 | End: 2024-09-20

## 2024-09-19 RX ORDER — NICOTINE POLACRILEX 4 MG
15-30 LOZENGE BUCCAL
Status: DISCONTINUED | OUTPATIENT
Start: 2024-09-19 | End: 2024-09-20

## 2024-09-19 RX ORDER — METFORMIN HCL 500 MG
500 TABLET, EXTENDED RELEASE 24 HR ORAL 2 TIMES DAILY WITH MEALS
Status: ON HOLD | COMMUNITY
Start: 2024-09-19 | End: 2024-09-30

## 2024-09-19 RX ORDER — MAGNESIUM OXIDE 400 MG/1
400 TABLET ORAL EVERY 4 HOURS
Status: COMPLETED | OUTPATIENT
Start: 2024-09-19 | End: 2024-09-19

## 2024-09-19 RX ADMIN — SIMVASTATIN 40 MG: 40 TABLET, FILM COATED ORAL at 22:46

## 2024-09-19 RX ADMIN — INSULIN GLARGINE 8 UNITS: 100 INJECTION, SOLUTION SUBCUTANEOUS at 08:58

## 2024-09-19 RX ADMIN — METHOCARBAMOL 500 MG: 500 TABLET ORAL at 08:52

## 2024-09-19 RX ADMIN — METFORMIN ER 500 MG 500 MG: 500 TABLET ORAL at 08:52

## 2024-09-19 RX ADMIN — METFORMIN ER 500 MG 500 MG: 500 TABLET ORAL at 18:54

## 2024-09-19 RX ADMIN — GABAPENTIN 300 MG: 300 CAPSULE ORAL at 08:52

## 2024-09-19 RX ADMIN — Medication 1 TABLET: at 08:52

## 2024-09-19 RX ADMIN — Medication 25 MCG: at 08:52

## 2024-09-19 RX ADMIN — HEPARIN SODIUM 5000 UNITS: 5000 INJECTION, SOLUTION INTRAVENOUS; SUBCUTANEOUS at 01:42

## 2024-09-19 RX ADMIN — HEPARIN SODIUM 5000 UNITS: 5000 INJECTION, SOLUTION INTRAVENOUS; SUBCUTANEOUS at 16:31

## 2024-09-19 RX ADMIN — GABAPENTIN 300 MG: 300 CAPSULE ORAL at 20:42

## 2024-09-19 RX ADMIN — METHOCARBAMOL 500 MG: 500 TABLET ORAL at 11:49

## 2024-09-19 RX ADMIN — GABAPENTIN 300 MG: 300 CAPSULE ORAL at 14:24

## 2024-09-19 RX ADMIN — ACETAMINOPHEN 650 MG: 325 TABLET ORAL at 08:52

## 2024-09-19 RX ADMIN — AMITRIPTYLINE HYDROCHLORIDE 10 MG: 10 TABLET, FILM COATED ORAL at 22:46

## 2024-09-19 RX ADMIN — HEPARIN SODIUM 5000 UNITS: 5000 INJECTION, SOLUTION INTRAVENOUS; SUBCUTANEOUS at 08:52

## 2024-09-19 RX ADMIN — FAMOTIDINE 20 MG: 20 TABLET ORAL at 20:42

## 2024-09-19 RX ADMIN — METHOCARBAMOL 500 MG: 500 TABLET ORAL at 20:42

## 2024-09-19 RX ADMIN — FAMOTIDINE 20 MG: 20 TABLET ORAL at 08:52

## 2024-09-19 RX ADMIN — POLYETHYLENE GLYCOL 3350 17 G: 17 POWDER, FOR SOLUTION ORAL at 08:52

## 2024-09-19 RX ADMIN — METHOCARBAMOL 500 MG: 500 TABLET ORAL at 16:31

## 2024-09-19 RX ADMIN — SENNOSIDES AND DOCUSATE SODIUM 2 TABLET: 8.6; 5 TABLET ORAL at 20:42

## 2024-09-19 RX ADMIN — MAGNESIUM OXIDE TAB 400 MG (241.3 MG ELEMENTAL MG) 400 MG: 400 (241.3 MG) TAB at 11:49

## 2024-09-19 RX ADMIN — MAGNESIUM OXIDE TAB 400 MG (241.3 MG ELEMENTAL MG) 400 MG: 400 (241.3 MG) TAB at 16:31

## 2024-09-19 ASSESSMENT — ACTIVITIES OF DAILY LIVING (ADL)
ADLS_ACUITY_SCORE: 33
ADLS_ACUITY_SCORE: 32
ADLS_ACUITY_SCORE: 33

## 2024-09-19 NOTE — PROGRESS NOTES
"[facility], [place]   Neurosurgery Progress Note:    Date of service: 9/19/2024    Assessment: Judah Leon is a 84 year old male  s/p Thoracic 7-11 Minimally Invasive Percutaneous Posterior instrumented fusion with Dr. Salas Guadalupe on 9/9/2024    Clinically Significant Risk Factors        # DMII: A1C = 8.2 % (Ref range: <5.7 %) within past 6 months   # Overweight: Estimated body mass index is 25.27 kg/m  as calculated from the following:    Height as of this encounter: 1.702 m (5' 7.01\").    Weight as of this encounter: 73.2 kg (161 lb 6 oz).       # Acute Kidney Injury, resolved   # CKD stage 3  # Metabolic encephalopathy, resolved     Plan:  - Serial Neuro exams  - SBP <160  - Pain control: PO oxycodone, tylenol, robaxin and gabapentin   - Activity: up with assist   - Restrictions/Bracing: TLSO when out of bed   - Diet: regular   - DVT prophylaxis: SCDs, subcutaneous heparin   - PT/OT: ARU  - Appreciate Endocrinology assistance regarding blood glucose control  - Bowel regimen    Wound Cares:  Please cleanse wound daily with betadine and replace dressing daily.  Please keep wound covered at all times except when changing dressing until you are seen in clinic by Dr. Salas Guadalupe. It is ok to remove and replaced soiled dressings.  Please continue bed baths and do not shower until follow-up in clinic with Dr. Guadalupe.      ASH Gray, CNP  Neurosurgery  Pager [phone]      Interval History:  No acute events overnight. Patient reports pain controlled this AM.  ARU still recommended.  Placement pending.       Objective:   Temp:  [97.7  F (36.5  C)-98.4  F (36.9  C)] 97.7  F (36.5  C)  Pulse:  [76] 76  Resp:  [15-18] 18  BP: (124-131)/(62-70) 124/62  SpO2:  [93 %-100 %] 96 %  I/O last 3 completed shifts:  In: 1160 [P.O.:1160]  Out: 1050 [Urine:1050]    Gen: Appears comfortable, NAD  Wound: Incision, clean, dry, intact without strikethrough  Neurologic:  - Alert & Oriented to person, place, " time, and situation  - Follows commands briskly  - Speech fluent, spontaneous. No aphasia or dysarthria.  - No gaze preference. No apparent hemineglect.  - PERRL, EOMI  - Strong eye closure, jaw clench, and cheek puff  - Face symmetric with sensation intact to light touch  - Palate elevates symmetrically, uvula midline, tongue protrudes midline  - Trapezii and sternocleidomastoid muscles 5/5 bilaterally  - No pronator drift     Del Tr Bi WE WF Gr   R 5 5 5 5 5 5   L 5 5 5 5 5 5    HF KE KF DF PF EHL   R 5 5 5 5 5 5   L 5 5 5 5 5 5     Reflexes 2+ throughout    Sensation intact and symmetric to light touch throughout    LABS  Recent Labs   Lab Test 09/19/24  0535 09/18/24  0628 09/17/24  0558   WBC 6.1 6.1 5.9   HGB 10.6* 10.2* 10.5*   MCV 98 99 96    181 184       Recent Labs   Lab Test 09/19/24  0812 09/19/24  0535 09/19/24  0145 09/18/24  0745 09/18/24  0628 09/17/24  0802 09/17/24  0558   NA  --  137  --   --  136  --  136   POTASSIUM  --  4.5  --   --  4.7  --  4.4   CHLORIDE  --  104  --   --  102  --  103   CO2  --  24  --   --  25  --  24   BUN  --  33.3*  --   --  30.7*  --  33.2*   CR  --  1.39*  --   --  1.44*  --  1.42*   ANIONGAP  --  9  --   --  9  --  9   ROB  --  9.6  --   --  9.5  --  9.5   * 211* 220*   < > 241*   < > 250*    < > = values in this interval not displayed.

## 2024-09-19 NOTE — PLAN OF CARE
Pt s/p T7-11 percutaneous posterior instrumented fusion for unstable T8-9 fracture after sustaining a fall, vss, neuros include: see flowsheet. PIV SL, regular diet w/CHO coverage, good PO intake, voids spont, BM this morning, up AO1 w/GB/walker and TLSO brace. Pt received PRN and scheduled pain meds with relief, surgical incision care completed and new dressing placed. Pt up in chair, talking on phone independently, wife present and attentive towards patient. Plan for rehab when facility accepts him. Continue to care per orders.      Problem: Adult Inpatient Plan of Care  Goal: Absence of Hospital-Acquired Illness or Injury  Intervention: Identify and Manage Fall Risk  Recent Flowsheet Documentation  Taken 9/19/2024 0750 by Ashley Davila RN  Safety Promotion/Fall Prevention:   activity supervised   assistive device/personal items within reach   clutter free environment maintained   increased rounding and observation   increase visualization of patient   lighting adjusted   mobility aid in reach   nonskid shoes/slippers when out of bed   patient and family education   room door open   room near nurse's station   room organization consistent   safety round/check completed   supervised activity  Intervention: Prevent Skin Injury  Recent Flowsheet Documentation  Taken 9/19/2024 0750 by Ashley Davila RN  Body Position: position changed independently  Skin Protection:   adhesive use limited   protective footwear used   pulse oximeter probe site changed  Device Skin Pressure Protection:   adhesive use limited   pressure points protected  Intervention: Prevent and Manage VTE (Venous Thromboembolism) Risk  Recent Flowsheet Documentation  Taken 9/19/2024 0750 by Ashley Davila RN  VTE Prevention/Management: (pt too high fall risk and impulsive) SCDs off (sequential compression devices)  Intervention: Prevent Infection  Recent Flowsheet Documentation  Taken 9/19/2024 0750 by sAhley Davila RN  Infection Prevention:    equipment surfaces disinfected   hand hygiene promoted   personal protective equipment utilized   single patient room provided   rest/sleep promoted   visitors restricted/screened   Goal Outcome Evaluation:

## 2024-09-19 NOTE — PROGRESS NOTES
Care Management Follow Up     Length of Stay (days): 14     Expected Discharge Date: 9/20/2024     Concerns to be Addressed:  Discharge planning    Patient plan of care discussed at interdisciplinary rounds: Yes     Anticipated Discharge Disposition:  Acute rehab placement        Anticipated Discharge Services:  Acute rehab placement  Anticipated Discharge DME:    Not applicable at this time     Patient/family educated on Medicare website which has current facility and service quality ratings:  Yes  Education Provided on the Discharge Plan:  Yes  Patient/Family in Agreement with the Plan:  Yes     Referrals Placed by CM/SW:    Post acute care facility's  Private pay costs discussed: Not applicable at this time     Discussed  Partnership in Safe Discharge Planning  document with patient/family: No      Handoff Completed: To be completed at time of d/c     Additional Information:  ANA is following pt for discharge planning.   Occupational and Physical Therapy are recommending Acute rehab placement   Patient is zero percent service connected as per [facility].   Pt remains medically ready for discharge.    Pt was declined for admit to the [facility] ARU unit as pt does not have a TBI or spinal cord injury  diagnosis.  As this is the case, pt will need to use his Medicare benefits for his rehab stay.  [facility]   Pt's Medicare number is #[id number].    While verifying pt's benefits, [facility] learned that pt has a [facility] Medicare Advantage Policy, ID: [id number] and Group number: [id number].   As this is the case, this ANA will need to obtain prior auth for pt's ARU stay from [employer] ([employer] processes prior approval requests for [facility]).  ANA returned a call to pt's son (Eric [phone]) and updated in regards to discharge planning.  Eric states that he lives in [place] and pt's other son lives in [place].  Eric requests that a  referral also be made to [facility] as it is closer to his home. Eric is also receptive to [facility]. Eric confirms that pt would have 24/7 support in the home following an ARU stay.  ANA faxed referral to [facility] via [employer].      ANA completed [employer] Prior Approval Request form and faxed it along with requested medical records - to [employer].     Next Steps:  Await prior approval from [employer] with plan to place [facility] (as pt has a hospital to hospital transfer agreement  in place and this is being addressed by the 6A RNCC)     ANA will continue to follow for discharge planning.     NANI Taveras  Social Work, 6A  Phone:  [phone]  Pager:  [phone]  9/19/2024

## 2024-09-19 NOTE — PROGRESS NOTES
Care Management Follow Up    Length of Stay (days): 14    Expected Discharge Date: 09/19/2024     Concerns to be Addressed:  Discharge planning    Patient plan of care discussed at interdisciplinary rounds: Yes    Anticipated Discharge Disposition:  Acute rehab placement        Anticipated Discharge Services:  Acute rehab placement  Anticipated Discharge DME:    Not applicable at this time    Patient/family educated on Medicare website which has current facility and service quality ratings:  Yes  Education Provided on the Discharge Plan:  Yes  Patient/Family in Agreement with the Plan:  Yes    Referrals Placed by CM/SW:    Post acute care facility's  Private pay costs discussed: Not applicable at this time    Discussed  Partnership in Safe Discharge Planning  document with patient/family: No     Handoff Completed: To be completed at time of d/c    Additional Information:  ANA is following pt for discharge planning.   Occupational and Physical Therapy are recommending Acute rehab placement   Patient is zero percent service connected as per [facility] nursing [facility] coverage [facility].   Pt remains medically ready for discharge.    Pt was declined for admit to the [facility] ARU unit as pt does not have a TBI or spinal cord injury  diagnosis.  As this is the case, pt will need to use his Medicare benefits for his rehab stay.  Pt was referred to [facility]. [facility] needed pt's Medicare number before they could make a final determination in regards to acceptance.   Pt's wife was not able to locate pt's Medicare card.   ANA phoned [place] Financial Counselor, Kia Sam this am to ask whether or not Kia has a system that she can look up Medicare numbers.  Kia confirmed that she did and completed this task.  Per Kia, pt's Medicare number is #[id number].  ANA  sent a Teams Message to Admissions (Holly) at [facility] and provided the Medicare number.   ANA inquired about  bed availability for today.   Holly states that they do not have an available bed today.  SW received a message to call pt's son, Eric ([phone]).   SW phoned Eric this am and left a message for Eric to call.  SW left a voice mail for pt's wife this am informing pt's wife that pt's Medicare number has been obtained.      Next Steps:  Await bed availability at [facility] vs return to the [facility] (as pt has a hospital to hospital transfer agreement  in place and this is being addressed by the 6A RNCC)    SW will continue to follow for discharge planning.    NANI Taveras  Social Work, 6A  Phone:  [phone]  Pager:  [phone]  9/19/2024       ERIC Moran

## 2024-09-19 NOTE — PROGRESS NOTES
Care Management Follow Up    Length of Stay (days): 14    Expected Discharge Date: 09/19/2024     Concerns to be Addressed:  discharge planning  Patient plan of care discussed at interdisciplinary rounds: Yes     Anticipated Discharge Disposition:  ARU vs return hospital transfer  Anticipated Discharge Services:  ARU vs return hospital transfer  Anticipated Discharge DME:  NA     Patient/family educated on Medicare website which has current facility and service quality ratings:  not today  Education Provided on the Discharge Plan:  not today  Patient/Family in Agreement with the Plan:  LEÓN     Referrals Placed by CM/SW:  NA  Private pay costs discussed: Not applicable     Discussed  Partnership in Safe Discharge Planning  document with patient/family: Not at this time     Handoff Completed: No, handoff not indicated or clinically appropriate at this time     Additional Information:  [phone][facility] spoke with Hailey. Hailey states they do not have any med-surg beds available today. Hailey states we can try back tomorrow.      Next Steps:   RNCC will continue to follow. Will continue to pursue return hospital transfer to the [facility] while SW pursues rehab placement to facilitate a timely discharge from [facility].      [facility] - return transfer agreement in place  Transfer Center: [phone]     Monae Palacios, RN, BSN  6A RN Care Coordinator  Ph: [phone]   Vocera: 6A Neuro RNCC

## 2024-09-19 NOTE — PLAN OF CARE
Status: S/P T7-11 percutaneous Posterior instrumented fusion for unstable T8-9 fracture. Hx of CkD, prostate Ca, and DMII.   Vitals: VSS on RA  Neuros: Neuro intact ex forgetful at times - some confusion upon waking up. N/T to BLE  IV: PIV SL  Labs/Electrolytes: BG checks/insulin AC/HS + 0200. Carb coverage   Resp/trach: RA. LS clear   Diet: Regular diet with carb coverage  Bowel status: LBM 9/18  : Voiding spont  Skin: back incision with primapore intact  Pain: Denied pain this shift   Activity: Up with 1 GB walker + TLSO when oob.   Social: No visitors this shift  Plan: Continue with POC. Medically ready for discharge, SW following for placement

## 2024-09-19 NOTE — PLAN OF CARE
Status: S/p T7-11  posterior fusion for unstable T8-9 fracture 9/9  Vitals: VSS on RA.   Neuros: A&Ox4, forgetful and intermittently confused    IV: PIV SL  Labs/Electrolytes: BG checks w/ carb coverage  Resp/trach: Denies SOB  Diet: Regular, good PO  Bowel status: Loose BM this afternoon  : Voiding via urinal  Skin: Back incision covered w/ primapore, CDI  Pain: Back pain managed w/ tylenol, scheduled meds. Declined any other PRNs  Activity: Up w/ 1, walker. TLSO when OOB   Social: Wife at bedside this afternoon, helpful/supportive  Plain:Medically ready for discharge, SW following for placement

## 2024-09-20 ENCOUNTER — APPOINTMENT (OUTPATIENT)
Dept: PHYSICAL THERAPY | Facility: CLINIC | Age: 84
DRG: 459 | End: 2024-09-20
Attending: NEUROLOGICAL SURGERY
Payer: COMMERCIAL

## 2024-09-20 LAB
ANION GAP SERPL CALCULATED.3IONS-SCNC: 10 MMOL/L (ref 7–15)
BUN SERPL-MCNC: 28.9 MG/DL (ref 8–23)
CALCIUM SERPL-MCNC: 10 MG/DL (ref 8.8–10.4)
CHLORIDE SERPL-SCNC: 105 MMOL/L (ref 98–107)
CREAT SERPL-MCNC: 1.39 MG/DL (ref 0.67–1.17)
EGFRCR SERPLBLD CKD-EPI 2021: 49 ML/MIN/1.73M2
ERYTHROCYTE [DISTWIDTH] IN BLOOD BY AUTOMATED COUNT: 13 % (ref 10–15)
GLUCOSE BLDC GLUCOMTR-MCNC: 188 MG/DL (ref 70–99)
GLUCOSE BLDC GLUCOMTR-MCNC: 192 MG/DL (ref 70–99)
GLUCOSE BLDC GLUCOMTR-MCNC: 205 MG/DL (ref 70–99)
GLUCOSE BLDC GLUCOMTR-MCNC: 220 MG/DL (ref 70–99)
GLUCOSE BLDC GLUCOMTR-MCNC: 228 MG/DL (ref 70–99)
GLUCOSE SERPL-MCNC: 199 MG/DL (ref 70–99)
HCO3 SERPL-SCNC: 23 MMOL/L (ref 22–29)
HCT VFR BLD AUTO: 33.3 % (ref 40–53)
HGB BLD-MCNC: 11.1 G/DL (ref 13.3–17.7)
MAGNESIUM SERPL-MCNC: 1.7 MG/DL (ref 1.7–2.3)
MCH RBC QN AUTO: 32.7 PG (ref 26.5–33)
MCHC RBC AUTO-ENTMCNC: 33.3 G/DL (ref 31.5–36.5)
MCV RBC AUTO: 98 FL (ref 78–100)
PHOSPHATE SERPL-MCNC: 3.6 MG/DL (ref 2.5–4.5)
PLATELET # BLD AUTO: 181 10E3/UL (ref 150–450)
POTASSIUM SERPL-SCNC: 4.6 MMOL/L (ref 3.4–5.3)
RBC # BLD AUTO: 3.39 10E6/UL (ref 4.4–5.9)
SODIUM SERPL-SCNC: 138 MMOL/L (ref 135–145)
WBC # BLD AUTO: 6.5 10E3/UL (ref 4–11)

## 2024-09-20 PROCEDURE — 97530 THERAPEUTIC ACTIVITIES: CPT | Mod: GP

## 2024-09-20 PROCEDURE — 250N000013 HC RX MED GY IP 250 OP 250 PS 637: Performed by: NURSE PRACTITIONER

## 2024-09-20 PROCEDURE — 85027 COMPLETE CBC AUTOMATED: CPT | Performed by: NURSE PRACTITIONER

## 2024-09-20 PROCEDURE — 84484 ASSAY OF TROPONIN QUANT: CPT | Performed by: NURSE PRACTITIONER

## 2024-09-20 PROCEDURE — 120N000002 HC R&B MED SURG/OB UMMC

## 2024-09-20 PROCEDURE — 84100 ASSAY OF PHOSPHORUS: CPT | Performed by: NURSE PRACTITIONER

## 2024-09-20 PROCEDURE — 36415 COLL VENOUS BLD VENIPUNCTURE: CPT | Performed by: NURSE PRACTITIONER

## 2024-09-20 PROCEDURE — 83735 ASSAY OF MAGNESIUM: CPT | Performed by: NURSE PRACTITIONER

## 2024-09-20 PROCEDURE — 93010 ELECTROCARDIOGRAM REPORT: CPT | Performed by: INTERNAL MEDICINE

## 2024-09-20 PROCEDURE — 97116 GAIT TRAINING THERAPY: CPT | Mod: GP

## 2024-09-20 PROCEDURE — 250N000011 HC RX IP 250 OP 636: Performed by: NURSE PRACTITIONER

## 2024-09-20 PROCEDURE — 80048 BASIC METABOLIC PNL TOTAL CA: CPT | Performed by: NURSE PRACTITIONER

## 2024-09-20 PROCEDURE — 93005 ELECTROCARDIOGRAM TRACING: CPT

## 2024-09-20 RX ORDER — NICOTINE POLACRILEX 4 MG
15-30 LOZENGE BUCCAL
Status: DISCONTINUED | OUTPATIENT
Start: 2024-09-20 | End: 2024-09-24 | Stop reason: HOSPADM

## 2024-09-20 RX ORDER — MAGNESIUM SULFATE HEPTAHYDRATE 40 MG/ML
2 INJECTION, SOLUTION INTRAVENOUS ONCE
Status: COMPLETED | OUTPATIENT
Start: 2024-09-20 | End: 2024-09-20

## 2024-09-20 RX ORDER — DEXTROSE MONOHYDRATE 25 G/50ML
25-50 INJECTION, SOLUTION INTRAVENOUS
Status: DISCONTINUED | OUTPATIENT
Start: 2024-09-20 | End: 2024-09-24 | Stop reason: HOSPADM

## 2024-09-20 RX ADMIN — HEPARIN SODIUM 5000 UNITS: 5000 INJECTION, SOLUTION INTRAVENOUS; SUBCUTANEOUS at 00:17

## 2024-09-20 RX ADMIN — INSULIN GLARGINE 8 UNITS: 100 INJECTION, SOLUTION SUBCUTANEOUS at 08:07

## 2024-09-20 RX ADMIN — ACETAMINOPHEN 650 MG: 325 TABLET ORAL at 06:56

## 2024-09-20 RX ADMIN — METHOCARBAMOL 500 MG: 500 TABLET ORAL at 13:12

## 2024-09-20 RX ADMIN — Medication 1 TABLET: at 07:55

## 2024-09-20 RX ADMIN — HEPARIN SODIUM 5000 UNITS: 5000 INJECTION, SOLUTION INTRAVENOUS; SUBCUTANEOUS at 17:00

## 2024-09-20 RX ADMIN — AMITRIPTYLINE HYDROCHLORIDE 10 MG: 10 TABLET, FILM COATED ORAL at 22:16

## 2024-09-20 RX ADMIN — GABAPENTIN 300 MG: 300 CAPSULE ORAL at 07:55

## 2024-09-20 RX ADMIN — FAMOTIDINE 20 MG: 20 TABLET ORAL at 20:36

## 2024-09-20 RX ADMIN — METHOCARBAMOL 500 MG: 500 TABLET ORAL at 17:00

## 2024-09-20 RX ADMIN — METHOCARBAMOL 500 MG: 500 TABLET ORAL at 07:55

## 2024-09-20 RX ADMIN — GABAPENTIN 300 MG: 300 CAPSULE ORAL at 13:12

## 2024-09-20 RX ADMIN — MAGNESIUM SULFATE HEPTAHYDRATE 2 G: 2 INJECTION, SOLUTION INTRAVENOUS at 08:03

## 2024-09-20 RX ADMIN — FAMOTIDINE 20 MG: 20 TABLET ORAL at 07:55

## 2024-09-20 RX ADMIN — OXYCODONE HYDROCHLORIDE 2.5 MG: 5 TABLET ORAL at 06:56

## 2024-09-20 RX ADMIN — SIMVASTATIN 40 MG: 40 TABLET, FILM COATED ORAL at 22:17

## 2024-09-20 RX ADMIN — HEPARIN SODIUM 5000 UNITS: 5000 INJECTION, SOLUTION INTRAVENOUS; SUBCUTANEOUS at 07:56

## 2024-09-20 RX ADMIN — METFORMIN ER 500 MG 500 MG: 500 TABLET ORAL at 07:55

## 2024-09-20 RX ADMIN — POLYETHYLENE GLYCOL 3350 17 G: 17 POWDER, FOR SOLUTION ORAL at 07:55

## 2024-09-20 RX ADMIN — Medication 25 MCG: at 07:55

## 2024-09-20 RX ADMIN — METHOCARBAMOL 500 MG: 500 TABLET ORAL at 20:36

## 2024-09-20 RX ADMIN — GABAPENTIN 300 MG: 300 CAPSULE ORAL at 20:36

## 2024-09-20 RX ADMIN — METFORMIN ER 500 MG 500 MG: 500 TABLET ORAL at 17:12

## 2024-09-20 ASSESSMENT — ACTIVITIES OF DAILY LIVING (ADL)
ADLS_ACUITY_SCORE: 33
ADLS_ACUITY_SCORE: 31
ADLS_ACUITY_SCORE: 33
ADLS_ACUITY_SCORE: 31
ADLS_ACUITY_SCORE: 31
ADLS_ACUITY_SCORE: 33
ADLS_ACUITY_SCORE: 33
ADLS_ACUITY_SCORE: 31
ADLS_ACUITY_SCORE: 33
ADLS_ACUITY_SCORE: 31
ADLS_ACUITY_SCORE: 31
ADLS_ACUITY_SCORE: 33
ADLS_ACUITY_SCORE: 33

## 2024-09-20 ASSESSMENT — VISUAL ACUITY: OU: BASELINE

## 2024-09-20 NOTE — PROGRESS NOTES
"[facility], [place]   Neurosurgery Progress Note:    Date of service: 9/20/2024    Assessment: Judah Leon is a 84 year old male  s/p Thoracic 7-11 Minimally Invasive Percutaneous Posterior instrumented fusion with Dr. Salas Guadalupe on 9/9/2024    Clinically Significant Risk Factors        # DMII: A1C = 8.2 % (Ref range: <5.7 %) within past 6 months   # Overweight: Estimated body mass index is 25.27 kg/m  as calculated from the following:    Height as of this encounter: 1.702 m (5' 7.01\").    Weight as of this encounter: 73.2 kg (161 lb 6 oz).       # Acute Kidney Injury, resolved   # CKD stage 3  # Metabolic encephalopathy, resolved     Plan:  - Serial Neuro exams  - SBP <160  - Pain control: PO oxycodone, tylenol, robaxin and gabapentin   - Activity: up with assist   - Restrictions/Bracing: TLSO when out of bed   - Diet: regular   - DVT prophylaxis: SCDs, subcutaneous heparin   - PT/OT: ARU  - Appreciate Endocrinology assistance regarding blood glucose control  - Bowel regimen  - Medically ready for ARU placement     Wound Cares:  Please cleanse wound daily with betadine and replace dressing daily.  Please keep wound covered at all times except when changing dressing until you are seen in clinic by Dr. Salas Guadalupe. It is ok to remove and replaced soiled dressings.  Please continue bed baths and do not shower until follow-up in clinic with Dr. Guadalupe.      ASH Gray, CNP  Neurosurgery  Pager [phone]      Interval History:  Patient reports right rib pain that radiated to right chest. EKG revealed NSR. Magnesium replaced. Pain resolved shortly after receiving oral pain medications. No further workup required.  Patient continues to be ready for ARU placement.       Objective:   Temp:  [97.1  F (36.2  C)-98.3  F (36.8  C)] 97.1  F (36.2  C)  Pulse:  [71-79] 74  Resp:  [13-18] 16  BP: (116-141)/(70-80) 141/80  SpO2:  [96 %-99 %] 98 %  I/O last 3 completed shifts:  In: 680 [P.O.:680]  Out: " 1630 [Urine:1630]    Gen: Appears comfortable, NAD  Wound: Incision, clean, dry, intact without strikethrough  Neurologic:  - Alert & Oriented to person, place, time, and situation  - Follows commands briskly  - Speech fluent, spontaneous. No aphasia or dysarthria.  - No gaze preference. No apparent hemineglect.  - PERRL, EOMI  - Strong eye closure, jaw clench, and cheek puff  - Face symmetric with sensation intact to light touch  - Palate elevates symmetrically, uvula midline, tongue protrudes midline  - Trapezii and sternocleidomastoid muscles 5/5 bilaterally  - No pronator drift     Del Tr Bi WE WF Gr   R 5 5 5 5 5 5   L 5 5 5 5 5 5    HF KE KF DF PF EHL   R 5 5 5 5 5 5   L 5 5 5 5 5 5     Reflexes 2+ throughout    Sensation intact and symmetric to light touch throughout    LABS  Recent Labs   Lab Test 09/20/24  0435 09/19/24  0535 09/18/24  0628   WBC 6.5 6.1 6.1   HGB 11.1* 10.6* 10.2*   MCV 98 98 99    192 181       Recent Labs   Lab Test 09/20/24  0743 09/20/24  0435 09/20/24  0128 09/19/24  0812 09/19/24  0535 09/18/24  0745 09/18/24  0628   NA  --  138  --   --  137  --  136   POTASSIUM  --  4.6  --   --  4.5  --  4.7   CHLORIDE  --  105  --   --  104  --  102   CO2  --  23  --   --  24  --  25   BUN  --  28.9*  --   --  33.3*  --  30.7*   CR  --  1.39*  --   --  1.39*  --  1.44*   ANIONGAP  --  10  --   --  9  --  9   ROB  --  10.0  --   --  9.6  --  9.5   * 199* 188*   < > 211*   < > 241*    < > = values in this interval not displayed.

## 2024-09-20 NOTE — PLAN OF CARE
Vitals: VSS on RA  Neuros: Alert and oriented x4, forgetful. Numbness to bilateral feet.  IV: PIV SL'd  Resp/trach: WNL on RA  Diet: Regular diet   Bowel status: BS+x4, last BM 9/18  : Voiding  Skin: Bruising throughout. Dressing to back CDI.  Pain: Declined pain medication overnight.   Activity: Up with assist of 1, GB and walker. TLSO when OOB.   Plan: Plan to discharge to ARU when available.    0710: Pt c/o chest pain 7/10-sharp. R chest to sternum. /80, R 16, HR 74, O2 98% on RA. Page sent to MARIANO Crews. Awaiting response. Tylenol and oxycodone given.

## 2024-09-20 NOTE — INTERIM SUMMARY
[facility] Pre-Admission Screen    Referral Source:  [facility] [address] [facility] 6211-02  Admit date to referring facility: 9/5/2024    Physical Medicine and Rehab Consult Completed: No    Rehab Diagnosis:    Orthopaedic Disorders 08.9 Other Orthopaedic: unstable T8-9 fracture s/p treatment and reduction of T9 vertebral body fracture, placement of bilateral percutaneous screws at T7 (left), T8, T9, T10 and T11, and T7-T11 posterior arthrodesis    Justification for Acute Inpatient Rehabilitation  Judah Leon is a 84 year old-year-old male who was in his usual state of health and was with his friend when he saw his friend start to lose his balance, so he ran to help. Unfortunately, his friend ended up landing on top of Mr. Leon and fractured his spine at T8-9. He denied loss of consciousness. He denies significant weakness, only endorsed sharp pain that begins in his midback and radiates across his abdomen. Neurosurgery was consulted due to the unstable T8-9 fracture and patient was placed in a TLSO. On 9/9, pt underwent treatment and reduction of T9 vertebral body fracture, placement of bilateral percutaneous screws at T7 (left), T8, T9, T10 and T11, and T7-T11 posterior arthrodesis with Bone Morphogenic Protein (BMP) and allograft bone chips. Pt's post op course has been complicated by hyperglycemia requiring endocrine involvement. Patient is currently stable to transfer to inpatient Acute Rehab.     The patient requires transfer to acute inpatient rehabilitation for intensive therapies not available in a lesser level of care including PT/OT/SLP, ongoing medical management at least three days per week, and rehabilitative nursing cares. At baseline pt is IND with mobility. Currently he is needing Ax1 for safe mobility and cares. Patient requires an intensive inpatient rehab program, with needs unable to be met in a lower level of care, to address the following acute  impairments:impaired activity tolerance, impaired balance, impaired cognition, impaired judgement and safety awareness, impaired strength, impaired weight shifting, and impulsiveness.    Current Active Medical Management Needs/Risks for Clinical Complications  The patient requires the high level of rehabilitation physician supervision that accompanies the provision of intensive rehabilitation therapy.  The patient needs the services of the rehabilitation physician to assess the patient medically and functionally and to modify the course of treatment as needed to maximize the patient's capacity to benefit from the rehabilitation process. Patient requires ongoing medical management and assessment of the following:   Pain: patient will need ongoing management of post op pain with adjustment of medications as indicated to ensure optimal therapy participation.   Diabetes mellitus type II: pt with elevated blood sugars post op so endocrine following; Glucose trending slightly above goal on current regimen. Discussion with primary team and hyperglycemia appears post-prandial in nature. Restarted Metformin appropriate. Will adjust ICR and team can reach out if further adjustments are warranted. Per ADA guidelines, treatment goals and recommendations for older adults with DM and medically complexity is less stringent BG control/A1c for safety - targets of 110-180 mg/dL and up to 250 mg/dL reasonable. Avoid reliance on A1c. Glucose decisions should be based on avoidance of hypoglycemia and symptomatic hyperglycemia.   SARAY on CKD stage III: CTM BMP daily.   Metabolic encephalopathy: patient will need ongoing assessment for changes in cognition; OT and SLP will complete cognitive evaluations  Musculoskeletal s/p spine surgery: patient will need ongoing assessment of spinal incision for signs of infection/healing; cleanse wound daily with betadine and replace dressing daily. Please keep wound covered at all times except when  changing dressing. It is ok to remove and replace soiled dressings. Patient is at risk for falls with injury.   High Fall Risk: Pt at risk for falls with injury in the setting of impaired balance, impaired cognition. Continue intensive therapies to maximize IND/safety with functional mobility.     Past Medical/Surgical History  Surgery in the past 100 days: Yes  Additional relevant past medical history: stage IIIb chronic kidney disease, diabetes, hypertension, history of prostate cancer, anemia     Level of Functioning Prior to Admission:    LIVING ENVIRONMENT  People in Home: spouse  Current Living Arrangements: house  Home Accessibility: stairs within home  Number of Stairs, Within Home, Primary: greater than 10 stairs (split level; 7 up/down)  Stair Railings, Within Home, Primary: railings on both sides of stairs  Transportation Anticipated:  (medical transportation)  Living Environment Comments: Lives with spouse in a split-level home, 7 stairs up/down with B railings, main living upstairs.    SELF-CARE  Usual Activity Tolerance: good  Equipment Currently Used at Home:  (Pt owns a cane and has a grab bar by the bathtub)  Activity/Exercise/Self-Care Comment: IND with mobility and I/ADLs at baseline.    Level of Function: GG Scale (Section GG Functional Ability and Goals; CMS's RICARDO Version 3.0 Manual effective 10.1.2019):  PT Current Function Goals for Rehab   Bed Rolling 4 Supervision or touching assistance 6 Independent   Supine to Sit 4 Supervision or touching assistance 6 Independent   Sit to Stand 3 Partial/moderate assistance 6 Independent   Transfer 3 Partial/moderate assistance 6 Independent   Ambulation 3 Partial/moderate assistance 6 Independent   Stairs 4 Supervision or touching assistance 6 Independent     OT Current Function Goals for Rehab   Feeding 5 Setup or clean-up assistance 6 Independent   Grooming 4 Supervision or touching assistance (standing) 6 Independent   Bathing Not completed 6  Independent   Upper Body Dressing 2 Substantial/maximal assistance 6 Independent   Lower Body Dressing 3 Partial/moderate assistance 6 Independent   Toileting 4 Supervision or touching assistance 6 Independent   Toilet Transfer 3 Partial/moderate assistance 6 Independent   Tub/Shower Transfer 88 Not attempted due to safety 6 Independent   Cognition Impaired (18/30 on SLUMS 9/18) Supervision     SLP Current Function Goals for Rehab   Swallow Not Impaired Not applicable   Communication Not Impaired Not applicable       Current Diet:  0-Thin and Regular    Summary Statement:  Patient is participating in daily PT and OT and is making progress. Pt completed SLUMS on 9/18/24 and scored 18/30 points indicating that cognition is currently functioning within the dementia range so will need ongoing OT assessment as well as full cognitive linguistic evaluation with Speech Therapy. Currently, pt is able to complete standing ADLs at the sink with SBA but requires rcueing. He is max A to don TLSO while seated EOB, Diana to don socks, CGA to don shorts. Pt is ambulating with FWW and SBA-min A and requires max A for pathfinding. When trialing ambulation with no AD, he is needing min to maxA.     Expected Therapies and Services Required During Inpatient Rehab Admission  Intensity of Therapy: Patient requires intensive therapies not available in a lesser level of care. Patient is motivated, making gains, and can tolerate 3 hours of therapy a day.  Physical Therapy: 60 minutes per day, 6 days a week for 8 days  Occupational Therapy: 60 minutes per day, 6 days a week for 8 days  Speech and Language Therapy: 60 minutes per day, 6 days a week for 8 days  Rehabilitation Nursing Needs: Patient requires 24 hour Rehab Nursing to manage vitals, medication education, positioning, carryover of new rehab techniques, skin integrity, blood sugar management, assess neurologic status, pain management, post-surgical incision care to promote healing  and prevent infection, and provide safe environment for patient at falls risk.    Precautions/restrictions/special needs:   Precautions: fall precautions and spinal precautions   Restrictions: No bending/twisting, no lifting >10lbs   Special Needs:  TLSO when OOB    Expected Level of Improvement: Mod I with mobility, transfers, ADLs, and stairs. Anticipate pt will need assist with all IADLs and supervision for safety.   Expected Length of time to achieve: 8 days    Anticipated Discharge Needs:  Anticipated Discharge Destination: Home  Anticipated Discharge Support: Family member  24/7 support available : Yes  Identified caregiver(s):  Spouse  Anticipated Discharge Needs: Home with outpatient therapy    Identified challenges/barriers:  Cognition    Liaison signature/date/time:     Physician statement of review and agreement:  I have reviewed and am in agreement of the need for IRF stay to address above functional and medical needs. In addition to above statements address, Patient requires intensive active and ongoing therapeutic intervention and multiple therapies; Patient requires medical supervision; Expected to actively participate in the intensive rehab program; Sufficiently stable to actively participate; Expectation for measurable improvement in functional capacity or adaption to impairments.    MD signature/date/time:

## 2024-09-20 NOTE — PROGRESS NOTES
Care Management Follow Up    Length of Stay (days): 15    Expected Discharge Date: 09/20/2024     Concerns to be Addressed:  Discharge planning)    Patient plan of care discussed at interdisciplinary rounds: Yes    Anticipated Discharge Disposition:  Acute rehab placement     Anticipated Discharge Services:  Acute rehab placement  Anticipated Discharge DME:    Not applicable at this time    Patient/family educated on Medicare website which has current facility and service quality ratings:   Yes  Education Provided on the Discharge Plan:   Yes  Patient/Family in Agreement with the Plan:  Yes    Referrals Placed by CM/SW:    Post acute care facility's  Private pay costs discussed: Not applicable at this time    Discussed  Partnership in Safe Discharge Planning  document with patient/family: No     Handoff Completed:  To be completed at time of discharge    Additional Information:  SW is following pt for discharge planning.   Occupational and Physical Therapy are recommending Acute rehab placement   Patient is zero percent service connected as per [facility] PEREZ.   Pt remains medically ready for discharge.    Pt was declined for admit to the [facility] ARU unit as pt does not have a TBI or spinal cord injury  diagnosis.  As this is the case, pt will need to use his Medicare benefits for his rehab stay.  [facility]   Pt's Medicare number is #[id number].    While verifying pt's benefits, [facility] learned that pt has a [employer] [facility] [employer] [facility] Medicare Advantage Policy, ID: [id number] and Group number: [id number].    ANA phoned Luis ([email or web address] processes prior approval requests for [employer]) this am ([phone], options 1,8,1,2,2) and spoke with Post Acute Care Case , Tamara OREILLY.  Tamara confirmed receipt of  [email or web address] Prior Approval Request form and requested medical record.  Tamara stated that pt's case is in clinical review.      ANA spoke with  Admissions at [facility] ([facility]) who states that they do not have bed availability today but will likely have beds available on the weekend if prior auth is received.    SW spoke with Admissions (Malina) at [facility].  Per Gertrude, they've not yet completed their assessment (in process).  Per Malina, they do not accept weekend admits.    Next Steps: Await prior auth and when received, secure accepting ARU.    SW will continue to follow for discharge  planning.    NANI Taveras  Social Work, 6A  Phone:  [phone]  Pager:  [phone]  9/20/2024       ERIC Moran

## 2024-09-20 NOTE — PLAN OF CARE
Status: S/P T7-11 percutaneous Posterior instrumented fusion for unstable T8-9 fracture on 9/9. Off insulin drip as of 9/11. Hx of CkD, prostate Ca, and DMII.   Vitals: VSS  Neuros: A&Ox4, mild confusion + forgetfulness. Numbness to feet baseline.   IV: PIV SL  Labs/Electrolytes: BG checks ACHS  Resp/trach: WNL on RA  Diet: regular diet w/ carb coverage. Good dinner intake  Bowel status: LBM 9/18  : voids spont to BR  Skin: back incision covered w [place] CDI  Pain: managed w/ scheduled meds  Activity: A1 GB/walker, TLSO when OOB  Plan&updates: discharge to ARU when bed available    Goal Outcome Evaluation:      Plan of Care Reviewed With: patient    Overall Patient Progress: no changeOverall Patient Progress: no change

## 2024-09-20 NOTE — PROGRESS NOTES
Brief Diabetes Note:    Person requesting consult:  Primary team requesting holli recommendations given elevation of blood sugars and risk of surgical site infection. IDS previously saw pt and signed off 9/13/24. Also holli consulted 9/18/24.     HPI: Judah Leon is a 84 year old-year-old male with a notable PMHx of stage IIIb chronic kidney disease, diabetes, hypertension, history of prostate cancer, anemia, presenting with unstable T 8-9 fracture. Patient was in his usual state of health and was with his friend on Thursday. Mr. Leon saw his friend start to lose his balance, so he ran to help. Unfortunately, his friend ended up landing on top of the Mr. Leon and fractured his spine at T8-9. He denied loss of consciousness. He denies significant weakness, only endorses sharp pain that begins in his midback and radiates across his abdomen. He denies changes in sensation. He denies incontinence, bowel/bladder symptoms, and saddle anesthesia. [facility]/5/24 for surgical management. Now s/p Thoracic 7-11 Minimally Invasive Percutaneous Posterior instrumented fusion with Dr. Salas Guadalupe on 9/9/2024.    PTA DM Regimen: Metformin 500mg BID, Glipizide 2.5mg daily    Current inpatient DM Regimen:   -  Lantus 8 units q 24 hrs at 0900   -  Novolog Meal Coverage: 1 units per 15 g CHO, TID AC and PRN with snacks/supplements   - Novolog Correction Scale: 1:25 >140 TID AC (high resistance), 1:25>200 HS (high resistance)  - Metformin XR 500mg BID    Steroids/procedures: none    Labs:     Lab Results   Component Value Date    A1C 8.2 09/09/2024           Assessment:   Type 2 Diabetes Mellitus, complicated by NPDR and nephropathy (CKD). Good control for age and comorbidities, A1c 8.2% (9/9/24).  CKD stage III    Mostly global hyperglycemia with current regimen.     Discussion with primary team: Concern about surgical site healing/infection with mostly global  hyperglycemia. Would like BG <180 mg/dL. Patient discharging to unknown ARU either today or tomorrow. Recommend increasing both Lantus and Novolog ICR while inpatient. Also reviewed discharge recommendations bolded below.     Per ADA guidelines, treatment goals and recommendations for older adults with DM and medically complexity is less stringent BG control/A1c for safety - targets of 110-180 mg/dL and up to 250 mg/dL reasonable.  Avoid reliance on A1c.  Glucose decisions should be based on avoidance of hypoglycemia and symptomatic hyperglycemia.     Recommendations:   -  Increase Lantus from 8 to 11 units q 24 hrs at 0900 --> ok to continue at discharge.  - Change Novolog Meal Coverage: 1 units per 15 -->12 g CHO, TID AC and PRN with snacks/supplements --> at discharge, discontinue and start Glipizide 5 mg daily with breakfast (can be increased as needed at ARU)  - Novolog Correction Scale: 1:25 >140 TID AC (high resistance), 1:25 >200 HS (high resistance) --> can continue at discharge to ARU (would not continue at home)  - Metformin XR 500mg BID --> continue at discharge to ARU.  - BG Monitoring: TID AC, HS, 0200 --> Continue at ARU. For home, has meter, keep checking fasting AM daily.   - Hypoglycemia protocol  - Carb counting protocol   - Outpatient Follow-up: [facility] and [facility].     Inpatient Diabetes will not see this patient for a full consult. Please call back with any questions, labile blood glucose readings, changes to steroid plan, changes to nutrition plan, or need for updated glycemic control recommendations (please allow for 24-48 hours for this).     Abbie Chavez PA-C  Inpatient Diabetes Service  Pager: [phone]  Available on [employer]    To contact Inpatient Diabetes Service:     7 AM - 5 PM: Page the IDS GERA following the patient that day (see filed or incomplete progress notes/consult notes under Endocrinology)    OR if uncertain of provider assignment: page job code [phone]    5 PM - 7 AM: First call  after hours is to primary service.    For urgent after-hours questions, page job code for on call fellow: [phone]

## 2024-09-20 NOTE — PLAN OF CARE
Pt here s/p T8-9 fx 2/2 fall, vss, neuros include: see flow sheet. PIV SL, regular diet w/CHO coverage, good PO intake, voids spont, up AO1 w/GB/walker/TLSO brace. RN teach pt about proper donning and doffing, continue to have pt practice. Pt's dressing changed, CDI. Please continue to avoid adhesive on skin as there are open small sheering that bleeds. Pt up in chair, ambulating hallways appropriately with staff and thearpy. Wife @ bedside and very supportive. Plan for rehab when accepted, see SW note for details. Continue to care per orders.   Problem: Adult Inpatient Plan of Care  Goal: Absence of Hospital-Acquired Illness or Injury  Intervention: Identify and Manage Fall Risk  Recent Flowsheet Documentation  Taken 9/20/2024 0830 by Ashley Davila RN  Safety Promotion/Fall Prevention:   activity supervised   assistive device/personal items within reach   clutter free environment maintained   increased rounding and observation   increase visualization of patient   lighting adjusted   mobility aid in reach   nonskid shoes/slippers when out of bed   patient and family education   room door open   room near nurse's station   room organization consistent   safety round/check completed   supervised activity  Intervention: Prevent Skin Injury  Recent Flowsheet Documentation  Taken 9/20/2024 0830 by Ashley Davila RN  Body Position: position changed independently  Skin Protection:   adhesive use limited   protective footwear used   pulse oximeter probe site changed  Device Skin Pressure Protection:   adhesive use limited   pressure points protected  Intervention: Prevent and Manage VTE (Venous Thromboembolism) Risk  Recent Flowsheet Documentation  Taken 9/20/2024 0830 by Ashley Davila RN  VTE Prevention/Management: (pt too high fall risk and impulsive) SCDs off (sequential compression devices)  Intervention: Prevent Infection  Recent Flowsheet Documentation  Taken 9/20/2024 0830 by Ashley Davila, JOSH  Infection  Prevention:   equipment surfaces disinfected   hand hygiene promoted   personal protective equipment utilized   single patient room provided   rest/sleep promoted   visitors restricted/screened   Goal Outcome Evaluation:

## 2024-09-20 NOTE — PROGRESS NOTES
Care Management Follow Up    Length of Stay (days): 15    Expected Discharge Date: 09/20/2024     Concerns to be Addressed:  discharge planning  Patient plan of care discussed at interdisciplinary rounds: Yes     Anticipated Discharge Disposition:  ARU vs return hospital transfer  Anticipated Discharge Services:  ARU vs return hospital transfer  Anticipated Discharge DME:  NA     Patient/family educated on Medicare website which has current facility and service quality ratings:  not today  Education Provided on the Discharge Plan:  not today  Patient/Family in Agreement with the Plan:  LEÓN     Referrals Placed by CM/SW:  NA  Private pay costs discussed: Not applicable     Discussed  Partnership in Safe Discharge Planning  document with patient/family: Not at this time     Handoff Completed: No, handoff not indicated or clinically appropriate at this time     Additional Information:  [phone][facility] spoke with Hailey. Hailey states they do not have any med-surg beds available today. Hailey states we can try back tomorrow.      Next Steps:   RNCC will continue to follow. Will continue to pursue return hospital transfer to the [facility] while SW pursues rehab placement to facilitate a timely discharge from [facility].      [facility] - return transfer agreement in place  Transfer Center: [phone]     Monae Palacios, RN, BSN  6A RN Care Coordinator  Ph: [phone]   Vocera: 6A Neuro RNCC

## 2024-09-20 NOTE — PROGRESS NOTES
Care Management Follow Up     Length of Stay (days): 15     Expected Discharge Date: 09/20/2024     Concerns to be Addressed:  Discharge planning)    Patient plan of care discussed at interdisciplinary rounds: Yes     Anticipated Discharge Disposition:  Acute rehab placement     Anticipated Discharge Services:  Acute rehab placement  Anticipated Discharge DME:    Not applicable at this time     Patient/family educated on Medicare website which has current facility and service quality ratings:   Yes  Education Provided on the Discharge Plan:   Yes  Patient/Family in Agreement with the Plan:  Yes     Referrals Placed by CM/SW:    Post acute care facility's  Private pay costs discussed: Not applicable at this time     Discussed  Partnership in Safe Discharge Planning  document with patient/family: No      Handoff Completed:  To be completed at time of discharge     Additional Information:  SW is following pt for discharge planning.   Occupational and Physical Therapy are recommending Acute rehab placement   Patient is zero percent service connected [facility] per [facility].   Pt remains medically ready for discharge.    Pt was declined for admit to the [facility] ARU unit as pt does not have a TBI or spinal cord injury  diagnosis.  As this is the case, pt will need to use his Medicare benefits for his rehab stay.  Pt was referred to [facility]. .   Pt's Medicare number is #[id number].    While verifying pt's benefits, [facility] learned that pt has a Blue Cross Blue [facility] Medicare Advantage Policy, ID: [id number] and Group number: [id number].     ANA phoned Luis ([employer] processes prior approval requests for [employer]) this am ([phone], options 1,8,1,2,2) and spoke with Post Acute Care Case , Tamara OREILLY.  Tamara confirmed receipt of  [employer] Prior Approval Request form and requested medical record.  Tamara stated that pt's case is in clinical review.       ANA spoke  with Admissions at [facility] ([facility]) who states that they do not have bed availability today but will likely have beds available on the weekend if prior auth is received.     ANA spoke with Admissions (Malina) at [facility].  Per Gertrude, pt is approved for admit however, they could not accept pt until 9/23/2024.  Per Malina, they do not accept weekend admits.    ANA phoned pt's son Eric ([phone]).  ANA informed Eric that prior auth from insurance is pending.  ANA informed Eric that if auth is received today or on the weekend, pt would admit to [facility] as pt cannot be held in the hospital to await the preferred location.  ANA informed Eric that if pt remains hospitalized through the weekend (due to delay in receiving prior auth or due to lack of bed availability at [facility], arrangements could then be made for admit to [facility] on 9/23/2024.  Eric voiced understanding, appreciation and agreement.       Next Steps: Await prior auth .       ANA will continue to follow for discharge  planning.     NANI Taveras  Social Work, 6A  Phone:  [phone]  Pager:  [phone]  9/20/2024

## 2024-09-20 NOTE — PROGRESS NOTES
CLINICAL NUTRITION SERVICES     Reviewed nutrition risk factors due to LOS. Pt is tolerating diet, eating well per nursing documentation. No obvious nutrition issues identified at this time. RD will follow per policy at this time, unless consulted.    Kayla Fletcher RD, LD   Available on [employer]  No longer available by pager

## 2024-09-20 NOTE — PROGRESS NOTES
"[facility], [place]   Neurosurgery Progress Note:    Date of service: 9/21/2024    Assessment: Judah Leon is a 84 year old male  s/p Thoracic 7-11 Minimally Invasive Percutaneous Posterior instrumented fusion with Dr. Salas Guadalupe on 9/9/2024    Clinically Significant Risk Factors        # DMII: A1C = 8.2 % (Ref range: <5.7 %) within past 6 months   # Overweight: Estimated body mass index is 25.27 kg/m  as calculated from the following:    Height as of this encounter: 1.702 m (5' 7.01\").    Weight as of this encounter: 73.2 kg (161 lb 6 oz).       # Acute Kidney Injury, resolved   # CKD stage 3  # Metabolic encephalopathy, resolved     Plan:  - Serial Neuro exams  - SBP <160  - Pain control: PO oxycodone, tylenol, robaxin and gabapentin   - Activity: up with assist   - Restrictions/Bracing: TLSO when out of bed   - Diet: regular   - DVT prophylaxis: SCDs, subcutaneous heparin   - PT/OT: ARU  - Appreciate Endocrinology assistance regarding blood glucose control  - Bowel regimen  - Medically ready for ARU placement     Wound Cares:  Please cleanse wound daily with betadine and replace dressing daily.  Please keep wound covered at all times except when changing dressing until you are seen in clinic by Dr. Salas Guadalupe. It is ok to remove and replaced soiled dressings.  Please continue bed baths and do not shower until follow-up in clinic with Dr. Guadalupe.    Rafy Bundy MD  Neurosurgery Resident  Pager: [phone]    Interval History:  Patient reports resolution of right rib pain. Patient continues to be ready for ARU placement.     Objective:   Temp:  [97.6  F (36.4  C)-97.8  F (36.6  C)] 97.8  F (36.6  C)  Pulse:  [81-85] 85  Resp:  [16] 16  BP: (127-151)/(68-73) 127/73  SpO2:  [98 %-100 %] 98 %  I/O last 3 completed shifts:  In: 450 [P.O.:450]  Out: 900 [Urine:900]    Gen: Appears comfortable, NAD  Wound: Incision, clean, dry, intact without strikethrough  Neurologic:  - Alert & Oriented to " person, place, time, and situation  - Follows commands briskly  - Speech fluent, spontaneous. No aphasia or dysarthria.  - No gaze preference. No apparent hemineglect.  - PERRL, EOMI  - Strong eye closure, jaw clench, and cheek puff  - Face symmetric with sensation intact to light touch  - Palate elevates symmetrically, uvula midline, tongue protrudes midline  - Trapezii and sternocleidomastoid muscles 5/5 bilaterally  - No pronator drift     Del Tr Bi WE WF Gr   R 5 5 5 5 5 5   L 5 5 5 5 5 5    HF KE KF DF PF EHL   R 5 5 5 5 5 5   L 5 5 5 5 5 5     Reflexes 2+ throughout    Sensation intact and symmetric to light touch throughout    LABS  Recent Labs   Lab Test 09/21/24  0728 09/20/24  0435 09/19/24  0535   WBC 5.7 6.5 6.1   HGB 10.4* 11.1* 10.6*   MCV 97 98 98    181 192       Recent Labs   Lab Test 09/21/24  0813 09/21/24 0728 09/21/24  0152 09/20/24  0743 09/20/24  0435 09/19/24  0812 09/19/24  0535   NA  --  137  --   --  138  --  137   POTASSIUM  --  4.6  --   --  4.6  --  4.5   CHLORIDE  --  104  --   --  105  --  104   CO2  --  24  --   --  23  --  24   BUN  --  33.8*  --   --  28.9*  --  33.3*   CR  --  1.58*  --   --  1.39*  --  1.39*   ANIONGAP  --  9  --   --  10  --  9   ROB  --  9.5  --   --  10.0  --  9.6   * 206* 185*   < > 199*   < > 211*    < > = values in this interval not displayed.

## 2024-09-21 ENCOUNTER — APPOINTMENT (OUTPATIENT)
Dept: PHYSICAL THERAPY | Facility: CLINIC | Age: 84
DRG: 459 | End: 2024-09-21
Attending: NEUROLOGICAL SURGERY
Payer: COMMERCIAL

## 2024-09-21 LAB
ANION GAP SERPL CALCULATED.3IONS-SCNC: 9 MMOL/L (ref 7–15)
BUN SERPL-MCNC: 33.8 MG/DL (ref 8–23)
CALCIUM SERPL-MCNC: 9.5 MG/DL (ref 8.8–10.4)
CHLORIDE SERPL-SCNC: 104 MMOL/L (ref 98–107)
CREAT SERPL-MCNC: 1.58 MG/DL (ref 0.67–1.17)
EGFRCR SERPLBLD CKD-EPI 2021: 42 ML/MIN/1.73M2
ERYTHROCYTE [DISTWIDTH] IN BLOOD BY AUTOMATED COUNT: 13 % (ref 10–15)
GLUCOSE BLDC GLUCOMTR-MCNC: 185 MG/DL (ref 70–99)
GLUCOSE BLDC GLUCOMTR-MCNC: 196 MG/DL (ref 70–99)
GLUCOSE BLDC GLUCOMTR-MCNC: 203 MG/DL (ref 70–99)
GLUCOSE BLDC GLUCOMTR-MCNC: 243 MG/DL (ref 70–99)
GLUCOSE BLDC GLUCOMTR-MCNC: 93 MG/DL (ref 70–99)
GLUCOSE SERPL-MCNC: 206 MG/DL (ref 70–99)
HCO3 SERPL-SCNC: 24 MMOL/L (ref 22–29)
HCT VFR BLD AUTO: 30.4 % (ref 40–53)
HGB BLD-MCNC: 10.4 G/DL (ref 13.3–17.7)
MAGNESIUM SERPL-MCNC: 1.8 MG/DL (ref 1.7–2.3)
MCH RBC QN AUTO: 33 PG (ref 26.5–33)
MCHC RBC AUTO-ENTMCNC: 34.2 G/DL (ref 31.5–36.5)
MCV RBC AUTO: 97 FL (ref 78–100)
PHOSPHATE SERPL-MCNC: 3.8 MG/DL (ref 2.5–4.5)
PLATELET # BLD AUTO: 187 10E3/UL (ref 150–450)
POTASSIUM SERPL-SCNC: 4.6 MMOL/L (ref 3.4–5.3)
RBC # BLD AUTO: 3.15 10E6/UL (ref 4.4–5.9)
SODIUM SERPL-SCNC: 137 MMOL/L (ref 135–145)
WBC # BLD AUTO: 5.7 10E3/UL (ref 4–11)

## 2024-09-21 PROCEDURE — 250N000013 HC RX MED GY IP 250 OP 250 PS 637

## 2024-09-21 PROCEDURE — 250N000009 HC RX 250

## 2024-09-21 PROCEDURE — 120N000002 HC R&B MED SURG/OB UMMC

## 2024-09-21 PROCEDURE — 36415 COLL VENOUS BLD VENIPUNCTURE: CPT | Performed by: NURSE PRACTITIONER

## 2024-09-21 PROCEDURE — 80048 BASIC METABOLIC PNL TOTAL CA: CPT | Performed by: NURSE PRACTITIONER

## 2024-09-21 PROCEDURE — 84100 ASSAY OF PHOSPHORUS: CPT | Performed by: NURSE PRACTITIONER

## 2024-09-21 PROCEDURE — 250N000013 HC RX MED GY IP 250 OP 250 PS 637: Performed by: NURSE PRACTITIONER

## 2024-09-21 PROCEDURE — 82310 ASSAY OF CALCIUM: CPT | Performed by: NURSE PRACTITIONER

## 2024-09-21 PROCEDURE — 250N000011 HC RX IP 250 OP 636: Performed by: NURSE PRACTITIONER

## 2024-09-21 PROCEDURE — 97116 GAIT TRAINING THERAPY: CPT | Mod: GP | Performed by: PHYSICAL THERAPIST

## 2024-09-21 PROCEDURE — 97530 THERAPEUTIC ACTIVITIES: CPT | Mod: GP | Performed by: PHYSICAL THERAPIST

## 2024-09-21 PROCEDURE — 85027 COMPLETE CBC AUTOMATED: CPT | Performed by: NURSE PRACTITIONER

## 2024-09-21 PROCEDURE — 83735 ASSAY OF MAGNESIUM: CPT | Performed by: NURSE PRACTITIONER

## 2024-09-21 RX ORDER — CARBOXYMETHYLCELLULOSE SODIUM 5 MG/ML
1 SOLUTION/ DROPS OPHTHALMIC
Status: DISCONTINUED | OUTPATIENT
Start: 2024-09-21 | End: 2024-09-24 | Stop reason: HOSPADM

## 2024-09-21 RX ORDER — GABAPENTIN 400 MG/1
400 CAPSULE ORAL 3 TIMES DAILY
Status: DISCONTINUED | OUTPATIENT
Start: 2024-09-21 | End: 2024-09-24 | Stop reason: HOSPADM

## 2024-09-21 RX ORDER — CARBOXYMETHYLCELLULOSE SODIUM 5 MG/ML
1 SOLUTION/ DROPS OPHTHALMIC
Qty: 30 EACH | Refills: 1 | Status: CANCELLED | OUTPATIENT
Start: 2024-09-21

## 2024-09-21 RX ADMIN — Medication 25 MCG: at 08:48

## 2024-09-21 RX ADMIN — ACETAMINOPHEN 650 MG: 325 TABLET ORAL at 02:17

## 2024-09-21 RX ADMIN — GABAPENTIN 400 MG: 400 CAPSULE ORAL at 20:09

## 2024-09-21 RX ADMIN — AMITRIPTYLINE HYDROCHLORIDE 10 MG: 10 TABLET, FILM COATED ORAL at 22:16

## 2024-09-21 RX ADMIN — HEPARIN SODIUM 5000 UNITS: 5000 INJECTION, SOLUTION INTRAVENOUS; SUBCUTANEOUS at 02:02

## 2024-09-21 RX ADMIN — METHOCARBAMOL 500 MG: 500 TABLET ORAL at 20:08

## 2024-09-21 RX ADMIN — POLYETHYLENE GLYCOL 3350 17 G: 17 POWDER, FOR SOLUTION ORAL at 20:11

## 2024-09-21 RX ADMIN — GABAPENTIN 400 MG: 400 CAPSULE ORAL at 08:48

## 2024-09-21 RX ADMIN — SIMVASTATIN 40 MG: 40 TABLET, FILM COATED ORAL at 22:16

## 2024-09-21 RX ADMIN — WHITE PETROLATUM 57.7 %-MINERAL OIL 31.9 % EYE OINTMENT: at 22:16

## 2024-09-21 RX ADMIN — HEPARIN SODIUM 5000 UNITS: 5000 INJECTION, SOLUTION INTRAVENOUS; SUBCUTANEOUS at 15:50

## 2024-09-21 RX ADMIN — ACETAMINOPHEN 650 MG: 325 TABLET ORAL at 08:48

## 2024-09-21 RX ADMIN — METFORMIN ER 500 MG 500 MG: 500 TABLET ORAL at 17:32

## 2024-09-21 RX ADMIN — Medication 1 TABLET: at 08:48

## 2024-09-21 RX ADMIN — FAMOTIDINE 20 MG: 20 TABLET ORAL at 08:49

## 2024-09-21 RX ADMIN — Medication 1 DROP: at 11:41

## 2024-09-21 RX ADMIN — METHOCARBAMOL 500 MG: 500 TABLET ORAL at 08:49

## 2024-09-21 RX ADMIN — METHOCARBAMOL 500 MG: 500 TABLET ORAL at 15:49

## 2024-09-21 RX ADMIN — SENNOSIDES AND DOCUSATE SODIUM 2 TABLET: 8.6; 5 TABLET ORAL at 20:10

## 2024-09-21 RX ADMIN — HEPARIN SODIUM 5000 UNITS: 5000 INJECTION, SOLUTION INTRAVENOUS; SUBCUTANEOUS at 08:48

## 2024-09-21 RX ADMIN — OXYCODONE HYDROCHLORIDE 2.5 MG: 5 TABLET ORAL at 02:17

## 2024-09-21 RX ADMIN — METHOCARBAMOL 500 MG: 500 TABLET ORAL at 11:41

## 2024-09-21 RX ADMIN — METFORMIN ER 500 MG 500 MG: 500 TABLET ORAL at 08:48

## 2024-09-21 RX ADMIN — GABAPENTIN 400 MG: 400 CAPSULE ORAL at 13:57

## 2024-09-21 RX ADMIN — POLYETHYLENE GLYCOL 3350 17 G: 17 POWDER, FOR SOLUTION ORAL at 08:48

## 2024-09-21 RX ADMIN — FAMOTIDINE 20 MG: 20 TABLET ORAL at 20:10

## 2024-09-21 ASSESSMENT — ACTIVITIES OF DAILY LIVING (ADL)
ADLS_ACUITY_SCORE: 31
ADLS_ACUITY_SCORE: 34
ADLS_ACUITY_SCORE: 35
ADLS_ACUITY_SCORE: 34
ADLS_ACUITY_SCORE: 34
ADLS_ACUITY_SCORE: 31
ADLS_ACUITY_SCORE: 34
ADLS_ACUITY_SCORE: 35
ADLS_ACUITY_SCORE: 34
ADLS_ACUITY_SCORE: 35
ADLS_ACUITY_SCORE: 35
ADLS_ACUITY_SCORE: 34
ADLS_ACUITY_SCORE: 34

## 2024-09-21 NOTE — PLAN OF CARE
Goal Outcome Evaluation:      Plan of Care Reviewed With: patient    Overall Patient Progress: no changeOverall Patient Progress: no change    Outcome Evaluation: Medically ready for discharge, SW following for placement to ARU    Status: S/p Thoracic 7-11 posterior fusion on 9/9 d/t unstable T 8-9 fracture.    Vitals: VSS   Neuros: A&Ox4, strengths 5/5 throughout, numbness to bilateral feet at baseline   IV: PIV SL   Labs/Electrolytes: BG checks ACHS   Resp/trach: LSC on RA   Diet: Regular diet   Bowel status: LBM 9/19, BS+  : Voiding spontaneously   Skin: Posterior back incision covered with primapore, CDI   Pain: Back pain managed with scheduled meds   Activity: Ax1 with GB walker, TLSO when OOB   Social: Family visited this shift, supportive   Plan/Updates this shift: SW following for placement to ARU, continue with POC.

## 2024-09-21 NOTE — PLAN OF CARE
Care from [id number]    Status: Admitted 9/5 with unstable T8-9 fracture. S/p T7-11 posterior fusion 9/9  Vitals: VSS on RA.   Neuros: A&O x4 sometimes needing choices. Forgetful. 5/5 throughout. Baseline numbness to bilateral feet  IV: PIV SL  Labs/Electrolytes: BG ACHS  Resp/trach: WNL  Diet: Regular diet. Needs reminders to orders. Tray set-up  Bowel status: LBM 9/19. Passing gas  : Voiding spontaneously.   Skin: Bruising throughout. Posterior back incision changed today- primapore CDI. Patient's skin tore with primapore tape- some open bleeding spots on back.   Pain: Back pain managed with scheduled medications  Activity: A1, GB, walker, TLSO when OOB. Can sit to apply TLSO  Social: Patient calling family on cell phone. No visitors this shift  Plan: ARU placement pending prior authorization     Goal Outcome Evaluation:      Plan of Care Reviewed With: patient    Overall Patient Progress: no change    Outcome Evaluation: Medically ready for discharge to ARU pending insurance & bed availability

## 2024-09-21 NOTE — PLAN OF CARE
Status: S/p Thoracic 7-11 posterior fusion on 9/9 d/t unstable T 8-9 fracture.    Vitals: VSS on RA, HTN in 150s  Neuros: A&Ox4, forgetful and needs cuing at times for orientation ?'s. Strengths 5/5 throughout with generalized weakness and numbness to bilateral feet at baseline   IV: PIV SL   Labs/Electrolytes: BG at 0200 was 185  Resp/trach: LSC on RA   Diet: Regular diet   Bowel status: LBM 9/19, BS+. Has scheduled miralax and senna  : Voiding spontaneously via urinal   Skin: Posterior back incision covered with primapore, CDI   Pain: Back pain managed with PRN tylenol and oxycodone   Activity: Ax1 with GB walker, TLSO when OOB   Plan/Updates this shift: SW following for placement to ARU pending prior authorization/bed availability    Goal Outcome Evaluation:      Plan of Care Reviewed With: patient    Overall Patient Progress: no change    Outcome Evaluation: Medically ready for discharge to ARU pending prior auth/bed availability

## 2024-09-22 ENCOUNTER — APPOINTMENT (OUTPATIENT)
Dept: OCCUPATIONAL THERAPY | Facility: CLINIC | Age: 84
DRG: 459 | End: 2024-09-22
Attending: NEUROLOGICAL SURGERY
Payer: COMMERCIAL

## 2024-09-22 LAB
ANION GAP SERPL CALCULATED.3IONS-SCNC: 10 MMOL/L (ref 7–15)
ATRIAL RATE - MUSE: 77 BPM
BUN SERPL-MCNC: 32.9 MG/DL (ref 8–23)
CALCIUM SERPL-MCNC: 9.7 MG/DL (ref 8.8–10.4)
CHLORIDE SERPL-SCNC: 104 MMOL/L (ref 98–107)
CREAT SERPL-MCNC: 1.68 MG/DL (ref 0.67–1.17)
DIASTOLIC BLOOD PRESSURE - MUSE: NORMAL MMHG
EGFRCR SERPLBLD CKD-EPI 2021: 39 ML/MIN/1.73M2
ERYTHROCYTE [DISTWIDTH] IN BLOOD BY AUTOMATED COUNT: 13 % (ref 10–15)
GLUCOSE BLDC GLUCOMTR-MCNC: 157 MG/DL (ref 70–99)
GLUCOSE BLDC GLUCOMTR-MCNC: 159 MG/DL (ref 70–99)
GLUCOSE BLDC GLUCOMTR-MCNC: 180 MG/DL (ref 70–99)
GLUCOSE BLDC GLUCOMTR-MCNC: 219 MG/DL (ref 70–99)
GLUCOSE BLDC GLUCOMTR-MCNC: 233 MG/DL (ref 70–99)
GLUCOSE SERPL-MCNC: 178 MG/DL (ref 70–99)
HCO3 SERPL-SCNC: 23 MMOL/L (ref 22–29)
HCT VFR BLD AUTO: 32.8 % (ref 40–53)
HGB BLD-MCNC: 10.9 G/DL (ref 13.3–17.7)
INTERPRETATION ECG - MUSE: NORMAL
MAGNESIUM SERPL-MCNC: 1.8 MG/DL (ref 1.7–2.3)
MCH RBC QN AUTO: 32.4 PG (ref 26.5–33)
MCHC RBC AUTO-ENTMCNC: 33.2 G/DL (ref 31.5–36.5)
MCV RBC AUTO: 98 FL (ref 78–100)
P AXIS - MUSE: 35 DEGREES
PHOSPHATE SERPL-MCNC: 4 MG/DL (ref 2.5–4.5)
PLATELET # BLD AUTO: 192 10E3/UL (ref 150–450)
POTASSIUM SERPL-SCNC: 4.4 MMOL/L (ref 3.4–5.3)
PR INTERVAL - MUSE: 178 MS
QRS DURATION - MUSE: 78 MS
QT - MUSE: 380 MS
QTC - MUSE: 430 MS
R AXIS - MUSE: -13 DEGREES
RBC # BLD AUTO: 3.36 10E6/UL (ref 4.4–5.9)
SODIUM SERPL-SCNC: 137 MMOL/L (ref 135–145)
SYSTOLIC BLOOD PRESSURE - MUSE: NORMAL MMHG
T AXIS - MUSE: 39 DEGREES
VENTRICULAR RATE- MUSE: 77 BPM
WBC # BLD AUTO: 5.6 10E3/UL (ref 4–11)

## 2024-09-22 PROCEDURE — 83735 ASSAY OF MAGNESIUM: CPT | Performed by: NURSE PRACTITIONER

## 2024-09-22 PROCEDURE — 85014 HEMATOCRIT: CPT | Performed by: NURSE PRACTITIONER

## 2024-09-22 PROCEDURE — 36415 COLL VENOUS BLD VENIPUNCTURE: CPT | Performed by: NURSE PRACTITIONER

## 2024-09-22 PROCEDURE — 97530 THERAPEUTIC ACTIVITIES: CPT | Mod: GO

## 2024-09-22 PROCEDURE — 250N000013 HC RX MED GY IP 250 OP 250 PS 637: Performed by: NURSE PRACTITIONER

## 2024-09-22 PROCEDURE — 250N000013 HC RX MED GY IP 250 OP 250 PS 637

## 2024-09-22 PROCEDURE — 84100 ASSAY OF PHOSPHORUS: CPT | Performed by: NURSE PRACTITIONER

## 2024-09-22 PROCEDURE — 120N000002 HC R&B MED SURG/OB UMMC

## 2024-09-22 PROCEDURE — 80048 BASIC METABOLIC PNL TOTAL CA: CPT | Performed by: NURSE PRACTITIONER

## 2024-09-22 PROCEDURE — 250N000011 HC RX IP 250 OP 636: Performed by: NURSE PRACTITIONER

## 2024-09-22 RX ORDER — GLIPIZIDE 5 MG/1
5 TABLET ORAL
Status: CANCELLED | OUTPATIENT
Start: 2024-09-23

## 2024-09-22 RX ORDER — FAMOTIDINE 10 MG
10 TABLET ORAL 2 TIMES DAILY
Status: CANCELLED | OUTPATIENT
Start: 2024-09-22

## 2024-09-22 RX ORDER — LIDOCAINE 4 G/G
2 PATCH TOPICAL
Status: CANCELLED | OUTPATIENT
Start: 2024-09-23

## 2024-09-22 RX ADMIN — LIDOCAINE 2 PATCH: 560 PATCH PERCUTANEOUS; TOPICAL; TRANSDERMAL at 23:46

## 2024-09-22 RX ADMIN — GABAPENTIN 400 MG: 400 CAPSULE ORAL at 14:44

## 2024-09-22 RX ADMIN — WHITE PETROLATUM 57.7 %-MINERAL OIL 31.9 % EYE OINTMENT: at 21:50

## 2024-09-22 RX ADMIN — METFORMIN ER 500 MG 500 MG: 500 TABLET ORAL at 07:48

## 2024-09-22 RX ADMIN — METHOCARBAMOL 500 MG: 500 TABLET ORAL at 16:20

## 2024-09-22 RX ADMIN — GABAPENTIN 400 MG: 400 CAPSULE ORAL at 20:06

## 2024-09-22 RX ADMIN — METHOCARBAMOL 500 MG: 500 TABLET ORAL at 07:48

## 2024-09-22 RX ADMIN — FAMOTIDINE 20 MG: 20 TABLET ORAL at 20:05

## 2024-09-22 RX ADMIN — METFORMIN ER 500 MG 500 MG: 500 TABLET ORAL at 17:34

## 2024-09-22 RX ADMIN — HYDROXYZINE HYDROCHLORIDE 10 MG: 10 TABLET ORAL at 05:14

## 2024-09-22 RX ADMIN — METHOCARBAMOL 500 MG: 500 TABLET ORAL at 20:06

## 2024-09-22 RX ADMIN — METHOCARBAMOL 500 MG: 500 TABLET ORAL at 11:59

## 2024-09-22 RX ADMIN — POLYETHYLENE GLYCOL 3350 17 G: 17 POWDER, FOR SOLUTION ORAL at 07:48

## 2024-09-22 RX ADMIN — POLYETHYLENE GLYCOL 3350 17 G: 17 POWDER, FOR SOLUTION ORAL at 20:04

## 2024-09-22 RX ADMIN — Medication 1 TABLET: at 07:48

## 2024-09-22 RX ADMIN — GABAPENTIN 400 MG: 400 CAPSULE ORAL at 07:48

## 2024-09-22 RX ADMIN — Medication 25 MCG: at 07:48

## 2024-09-22 RX ADMIN — HEPARIN SODIUM 5000 UNITS: 5000 INJECTION, SOLUTION INTRAVENOUS; SUBCUTANEOUS at 16:20

## 2024-09-22 RX ADMIN — HEPARIN SODIUM 5000 UNITS: 5000 INJECTION, SOLUTION INTRAVENOUS; SUBCUTANEOUS at 07:52

## 2024-09-22 RX ADMIN — OXYCODONE HYDROCHLORIDE 2.5 MG: 5 TABLET ORAL at 23:13

## 2024-09-22 RX ADMIN — HEPARIN SODIUM 5000 UNITS: 5000 INJECTION, SOLUTION INTRAVENOUS; SUBCUTANEOUS at 00:29

## 2024-09-22 RX ADMIN — FAMOTIDINE 20 MG: 20 TABLET ORAL at 07:48

## 2024-09-22 RX ADMIN — HEPARIN SODIUM 5000 UNITS: 5000 INJECTION, SOLUTION INTRAVENOUS; SUBCUTANEOUS at 23:44

## 2024-09-22 RX ADMIN — SENNOSIDES AND DOCUSATE SODIUM 2 TABLET: 8.6; 5 TABLET ORAL at 07:48

## 2024-09-22 RX ADMIN — AMITRIPTYLINE HYDROCHLORIDE 10 MG: 10 TABLET, FILM COATED ORAL at 21:47

## 2024-09-22 RX ADMIN — SENNOSIDES AND DOCUSATE SODIUM 2 TABLET: 8.6; 5 TABLET ORAL at 20:06

## 2024-09-22 RX ADMIN — SIMVASTATIN 40 MG: 40 TABLET, FILM COATED ORAL at 21:47

## 2024-09-22 ASSESSMENT — ACTIVITIES OF DAILY LIVING (ADL)
ADLS_ACUITY_SCORE: 34

## 2024-09-22 NOTE — PROGRESS NOTES
Care Management Follow Up    Length of Stay (days): 17    Expected Discharge Date: 09/24/2024     Concerns to be Addressed:  (Discharge planning)     Patient plan of care discussed at interdisciplinary rounds: No    Anticipated Discharge Disposition:  (Acute rehab placement)      Anticipated Discharge Services:  (Acute rehab placement)  Anticipated Discharge DME:  N/A     Patient/family educated on Medicare website which has current facility and service quality ratings:  Not today   Education Provided on the Discharge Plan:  Not today   Patient/Family in Agreement with the Plan:  Plan not discussed with family today.     Referrals Placed by CM/SW:    Private pay costs discussed: Not applicable    Discussed  Partnership in Safe Discharge Planning  document with patient/family: No     Handoff Completed: No, handoff not indicated or clinically appropriate    Additional Information:  ANA reviewed the Fax from [employer], regarding the decision of the previous prior auth that was sent. [employer] denied the prior Auth. ANA contacted the Medical Director's department through [employer] and talked to Salvador who stated the reviewing doctor is requesting recent orders or reports from PT, OT and clinical notes/medical assessments supporting the need for an ARU. Salvador stated from what she can see the reviewing doctor may possibly be suggesting a SNF instead of ARU.      Next Steps: ANA will follow up with PT/OT and nursing staff.      ERIC Gallo BSW   9/22/2024       Social Work and Care Management Department       SEARCHABLE in [facility] - search SOCIAL WORK       [place] ([phone]) Saturday and Sunday     Units: 4A Vocera, 4C Vocera, & 4E Vocera        Units: 5A 5556-5970 Vocera, 5A 9253-3823 Vocera , BMT SW 1 BMT SW 2, BMT SW 3 & BMT SW 4  5C Off Service 5401 - 5429  5C Off Service 1757-1248     Units: 6A Vocera & 6B Vocera      Units: 6C Vocera     Units: 7A Vocera & 7B Vocera      Units:  7C Med Surg 7401 thru 7418 and 7C Med Surg 7502 thru 7521      Unit: [place] ED Vocera & [place] Obs Vocera     [facility] ([phone]) Saturday and Sunday      Units: 5 Ortho Vocera, 5 Med Surg Vocera & WB ED Vocera     Units: 6 Med Surg Vocera, 8 Med Surg Vocera, & 10 ICU Vocera      After hours Vocera [facility] and After Hours Vocera [place]     Saturday & Sunday (1630 [phone] 0000)    Mon-Fri ([phone])     [facility] Recognized Holidays  ([phone])    Units: ALL   - see above VOCERA links to units and after hours

## 2024-09-22 NOTE — PROGRESS NOTES
Care Management Follow Up    Length of Stay (days): 17    Expected Discharge Date: 09/23/2024     Concerns to be Addressed:  discharge planning  Patient plan of care discussed at interdisciplinary rounds: Yes     Anticipated Discharge Disposition:  ARU vs return hospital transfer  Anticipated Discharge Services:  ARU vs return hospital transfer  Anticipated Discharge DME:  NA     Patient/family educated on Medicare website which has current facility and service quality ratings:  not today  Education Provided on the Discharge Plan:  not today  Patient/Family in Agreement with the Plan:  LEÓN     Referrals Placed by CM/SW:  NA  Private pay costs discussed: Not applicable     Discussed  Partnership in Safe Discharge Planning  document with patient/family: Not at this time     Handoff Completed: No, handoff not indicated or clinically appropriate at this time     Additional Information:  [facility] (ph: [phone]), spoke with Leanna. Leanna states they do not have any med-surg beds available today.      Next Steps:   RNCC will continue to follow. Will continue to pursue return hospital transfer to the [facility] while ANA pursues rehab placement to facilitate a timely discharge from [facility].      [facility] - return transfer agreement in place  Transfer Center: [phone]     Monae Palacios, RN, BSN  6A RN Care Coordinator  Ph: [phone]   Vocera: 6A Neuro RNCC

## 2024-09-22 NOTE — PROGRESS NOTES
"[facility], [place]   Neurosurgery Progress Note:    Date of service: 9/22/2024    Assessment: Judah Leon is a 84 year old male  s/p Thoracic 7-11 Minimally Invasive Percutaneous Posterior instrumented fusion with Dr. Salas Guadalupe on 9/9/2024    Clinically Significant Risk Factors        # DMII: A1C = 8.2 % (Ref range: <5.7 %) within past 6 months   # Overweight: Estimated body mass index is 25.27 kg/m  as calculated from the following:    Height as of this encounter: 1.702 m (5' 7.01\").    Weight as of this encounter: 73.2 kg (161 lb 6 oz).       # Acute Kidney Injury, resolved   # CKD stage 3  # Metabolic encephalopathy, resolved     Plan:  - Serial Neuro exams  - SBP <160  - Pain control: PO oxycodone, tylenol, robaxin and gabapentin   - Activity: up with assist   - Restrictions/Bracing: TLSO when out of bed   - Diet: regular   - DVT prophylaxis: SCDs, subcutaneous heparin   - PT/OT: ARU  - Appreciate Endocrinology assistance regarding blood glucose control  - Bowel regimen  - Encourage liberal oral hydration  - Medically ready for ARU placement     Wound Cares:  Please cleanse wound daily with betadine and replace dressing daily.  Please keep wound covered at all times except when changing dressing until you are seen in clinic by Dr. Salas Guadalupe. It is ok to remove and replaced soiled dressings.  Please continue bed baths and do not shower until follow-up in clinic with Dr. Guadalupe.    April Hull MD, PGY-1  Department of Neurosurgery  Pager: [phone]    Please contact neurosurgery resident on call with questions.    Dial * * *[phone], enter  when prompted.       Interval History:  Patient reports resolution of right rib pain. Patient continues to be ready for ARU placement.     Objective:   Temp:  [96.8  F (36  C)-97.8  F (36.6  C)] 97.8  F (36.6  C)  Pulse:  [71-77] 71  Resp:  [16-18] 17  BP: (115-126)/(65-83) 126/75  SpO2:  [99 %-100 %] 100 %  I/O last 3 completed " shifts:  In: 1220 [P.O.:1220]  Out: 600 [Urine:600]    Gen: Appears comfortable, NAD  Wound: Incision, clean, dry, intact without strikethrough  Neurologic:  - Alert & Oriented to person, place, time, and situation  - Follows commands briskly  - Speech fluent, spontaneous. No aphasia or dysarthria.  - No gaze preference. No apparent hemineglect.  - PERRL, EOMI  - Strong eye closure, jaw clench, and cheek puff  - Face symmetric with sensation intact to light touch  - Palate elevates symmetrically, uvula midline, tongue protrudes midline  - Trapezii and sternocleidomastoid muscles 5/5 bilaterally  - No pronator drift     Del Tr Bi WE WF Gr   R 5 5 5 5 5 5   L 5 5 5 5 5 5    HF KE KF DF PF EHL   R 5 5 5 5 5 5   L 5 5 5 5 5 5     Reflexes 2+ throughout    Sensation intact and symmetric to light touch throughout    LABS  Recent Labs   Lab Test 09/22/24  0550 09/21/24  0728 09/20/24  0435   WBC 5.6 5.7 6.5   HGB 10.9* 10.4* 11.1*   MCV 98 97 98    187 181       Recent Labs   Lab Test 09/22/24  0754 09/22/24  0550 09/22/24  0208 09/21/24  0813 09/21/24  0728 09/20/24  0743 09/20/24  0435   NA  --  137  --   --  137  --  138   POTASSIUM  --  4.4  --   --  4.6  --  4.6   CHLORIDE  --  104  --   --  104  --  105   CO2  --  23  --   --  24  --  23   BUN  --  32.9*  --   --  33.8*  --  28.9*   CR  --  1.68*  --   --  1.58*  --  1.39*   ANIONGAP  --  10  --   --  9  --  10   ROB  --  9.7  --   --  9.5  --  10.0   * 178* 157*   < > 206*   < > 199*    < > = values in this interval not displayed.

## 2024-09-22 NOTE — PLAN OF CARE
Care from [id number]    Status: Admitted 9/5 with unstable T8-9 fracture. S/p T7-11 posterior fusion 9/9  Vitals: VSS on RA.   Neuros: A&O x4 sometimes needing choices. Forgetful. 5/5 throughout. Baseline numbness to bilateral feet  IV: PIV SL  Labs/Electrolytes: BG ACHS  Resp/trach: WNL  Diet: Regular diet. Needs reminders to orders. Tray set-up. Good PO  Bowel status: BM this shift. LBM 9/22  : Voiding spontaneously & intermittent incontinence.  Skin: Bruising throughout. Posterior back incision changed today- primapore CDI. Patient's skin tears easily with primapore tape. Please peel off primapore tape carefully when changing dressing.  Pain: Back pain managed with scheduled medications  Activity: A1, GB, walker, TLSO when OOB. Can sit to apply TLSO. Up in recliner most of day. Walked halls x3.  Social: Wife visited today.  Plan: ARU placement pending prior authorization     Goal Outcome Evaluation:      Plan of Care Reviewed With: patient    Overall Patient Progress: no change    Outcome Evaluation: Medically ready for discharge

## 2024-09-22 NOTE — PLAN OF CARE
Status: S/p Thoracic 7-11 posterior fusion on 9/9 d/t unstable T 8-9 fracture.   Vitals: VSS  Neuros: A&O x4, forgetful. Numbness to BLE - denies this shift. 5/5 strengths throughout with generalized weakness  IV: Right PIV SL  Labs/Electrolytes: draws in the AM  Resp/trach: on RA, denies SOB  Diet: Regular  Bowel status: LBM 9/19, bowel meds given  : voiding with urgency/frequency via urinal, strains to void  Skin: Posterior back incision covered with primapore, CDI. Atarax given for itching at incisional site  Pain: Back pain managed with scheduled pain meds.   Activity: A1/GB/Walker, TLSO when OOB  Social: Watching the [employer] game at the beginning of shift  Plan: SW following for placement to ARU pending prior authorization/bed availability

## 2024-09-23 ENCOUNTER — APPOINTMENT (OUTPATIENT)
Dept: OCCUPATIONAL THERAPY | Facility: CLINIC | Age: 84
DRG: 459 | End: 2024-09-23
Attending: NEUROLOGICAL SURGERY
Payer: COMMERCIAL

## 2024-09-23 ENCOUNTER — APPOINTMENT (OUTPATIENT)
Dept: PHYSICAL THERAPY | Facility: CLINIC | Age: 84
DRG: 459 | End: 2024-09-23
Attending: NEUROLOGICAL SURGERY
Payer: COMMERCIAL

## 2024-09-23 LAB
ANION GAP SERPL CALCULATED.3IONS-SCNC: 10 MMOL/L (ref 7–15)
BUN SERPL-MCNC: 39.5 MG/DL (ref 8–23)
CALCIUM SERPL-MCNC: 9.5 MG/DL (ref 8.8–10.4)
CHLORIDE SERPL-SCNC: 103 MMOL/L (ref 98–107)
CREAT SERPL-MCNC: 1.57 MG/DL (ref 0.67–1.17)
EGFRCR SERPLBLD CKD-EPI 2021: 43 ML/MIN/1.73M2
ERYTHROCYTE [DISTWIDTH] IN BLOOD BY AUTOMATED COUNT: 12.9 % (ref 10–15)
GLUCOSE BLDC GLUCOMTR-MCNC: 160 MG/DL (ref 70–99)
GLUCOSE BLDC GLUCOMTR-MCNC: 190 MG/DL (ref 70–99)
GLUCOSE BLDC GLUCOMTR-MCNC: 196 MG/DL (ref 70–99)
GLUCOSE BLDC GLUCOMTR-MCNC: 226 MG/DL (ref 70–99)
GLUCOSE BLDC GLUCOMTR-MCNC: 238 MG/DL (ref 70–99)
GLUCOSE SERPL-MCNC: 245 MG/DL (ref 70–99)
HCO3 SERPL-SCNC: 24 MMOL/L (ref 22–29)
HCT VFR BLD AUTO: 30.3 % (ref 40–53)
HGB BLD-MCNC: 10.2 G/DL (ref 13.3–17.7)
MAGNESIUM SERPL-MCNC: 2.8 MG/DL (ref 1.7–2.3)
MCH RBC QN AUTO: 32.7 PG (ref 26.5–33)
MCHC RBC AUTO-ENTMCNC: 33.7 G/DL (ref 31.5–36.5)
MCV RBC AUTO: 97 FL (ref 78–100)
PHOSPHATE SERPL-MCNC: 4 MG/DL (ref 2.5–4.5)
PLATELET # BLD AUTO: 177 10E3/UL (ref 150–450)
POTASSIUM SERPL-SCNC: 4.7 MMOL/L (ref 3.4–5.3)
RBC # BLD AUTO: 3.12 10E6/UL (ref 4.4–5.9)
SODIUM SERPL-SCNC: 137 MMOL/L (ref 135–145)
TROPONIN I SERPL HS-MCNC: 6 NG/L
WBC # BLD AUTO: 7 10E3/UL (ref 4–11)

## 2024-09-23 PROCEDURE — 36415 COLL VENOUS BLD VENIPUNCTURE: CPT | Performed by: NURSE PRACTITIONER

## 2024-09-23 PROCEDURE — 84100 ASSAY OF PHOSPHORUS: CPT | Performed by: NURSE PRACTITIONER

## 2024-09-23 PROCEDURE — 97116 GAIT TRAINING THERAPY: CPT | Mod: GP | Performed by: PHYSICAL THERAPIST

## 2024-09-23 PROCEDURE — 97530 THERAPEUTIC ACTIVITIES: CPT | Mod: GO

## 2024-09-23 PROCEDURE — 80048 BASIC METABOLIC PNL TOTAL CA: CPT | Performed by: NURSE PRACTITIONER

## 2024-09-23 PROCEDURE — 250N000011 HC RX IP 250 OP 636: Performed by: NURSE PRACTITIONER

## 2024-09-23 PROCEDURE — 250N000013 HC RX MED GY IP 250 OP 250 PS 637: Performed by: NURSE PRACTITIONER

## 2024-09-23 PROCEDURE — G0463 HOSPITAL OUTPT CLINIC VISIT: HCPCS

## 2024-09-23 PROCEDURE — 97535 SELF CARE MNGMENT TRAINING: CPT | Mod: GO

## 2024-09-23 PROCEDURE — 97110 THERAPEUTIC EXERCISES: CPT | Mod: GP | Performed by: PHYSICAL THERAPIST

## 2024-09-23 PROCEDURE — 250N000013 HC RX MED GY IP 250 OP 250 PS 637

## 2024-09-23 PROCEDURE — 85018 HEMOGLOBIN: CPT | Performed by: NURSE PRACTITIONER

## 2024-09-23 PROCEDURE — 83735 ASSAY OF MAGNESIUM: CPT | Performed by: NURSE PRACTITIONER

## 2024-09-23 PROCEDURE — 120N000002 HC R&B MED SURG/OB UMMC

## 2024-09-23 RX ADMIN — FAMOTIDINE 20 MG: 20 TABLET ORAL at 20:05

## 2024-09-23 RX ADMIN — MAGNESIUM HYDROXIDE 30 ML: 400 SUSPENSION ORAL at 08:26

## 2024-09-23 RX ADMIN — Medication 1 DROP: at 14:13

## 2024-09-23 RX ADMIN — METHOCARBAMOL 500 MG: 500 TABLET ORAL at 18:14

## 2024-09-23 RX ADMIN — METHOCARBAMOL 500 MG: 500 TABLET ORAL at 08:27

## 2024-09-23 RX ADMIN — SIMVASTATIN 40 MG: 40 TABLET, FILM COATED ORAL at 22:04

## 2024-09-23 RX ADMIN — Medication 1 TABLET: at 08:26

## 2024-09-23 RX ADMIN — AMITRIPTYLINE HYDROCHLORIDE 10 MG: 10 TABLET, FILM COATED ORAL at 22:04

## 2024-09-23 RX ADMIN — METHOCARBAMOL 500 MG: 500 TABLET ORAL at 14:13

## 2024-09-23 RX ADMIN — POLYETHYLENE GLYCOL 3350 17 G: 17 POWDER, FOR SOLUTION ORAL at 08:27

## 2024-09-23 RX ADMIN — GABAPENTIN 400 MG: 400 CAPSULE ORAL at 20:05

## 2024-09-23 RX ADMIN — METHOCARBAMOL 500 MG: 500 TABLET ORAL at 20:05

## 2024-09-23 RX ADMIN — WHITE PETROLATUM 57.7 %-MINERAL OIL 31.9 % EYE OINTMENT: at 22:05

## 2024-09-23 RX ADMIN — METFORMIN ER 500 MG 500 MG: 500 TABLET ORAL at 18:14

## 2024-09-23 RX ADMIN — SENNOSIDES AND DOCUSATE SODIUM 2 TABLET: 8.6; 5 TABLET ORAL at 08:27

## 2024-09-23 RX ADMIN — HEPARIN SODIUM 5000 UNITS: 5000 INJECTION, SOLUTION INTRAVENOUS; SUBCUTANEOUS at 23:38

## 2024-09-23 RX ADMIN — HEPARIN SODIUM 5000 UNITS: 5000 INJECTION, SOLUTION INTRAVENOUS; SUBCUTANEOUS at 16:27

## 2024-09-23 RX ADMIN — GABAPENTIN 400 MG: 400 CAPSULE ORAL at 08:27

## 2024-09-23 RX ADMIN — METFORMIN ER 500 MG 500 MG: 500 TABLET ORAL at 08:27

## 2024-09-23 RX ADMIN — GABAPENTIN 400 MG: 400 CAPSULE ORAL at 15:04

## 2024-09-23 RX ADMIN — Medication 25 MCG: at 08:27

## 2024-09-23 RX ADMIN — HEPARIN SODIUM 5000 UNITS: 5000 INJECTION, SOLUTION INTRAVENOUS; SUBCUTANEOUS at 08:26

## 2024-09-23 RX ADMIN — POLYETHYLENE GLYCOL 3350 17 G: 17 POWDER, FOR SOLUTION ORAL at 20:06

## 2024-09-23 RX ADMIN — SENNOSIDES AND DOCUSATE SODIUM 2 TABLET: 8.6; 5 TABLET ORAL at 20:05

## 2024-09-23 RX ADMIN — FAMOTIDINE 20 MG: 20 TABLET ORAL at 08:27

## 2024-09-23 ASSESSMENT — ACTIVITIES OF DAILY LIVING (ADL)
ADLS_ACUITY_SCORE: 34

## 2024-09-23 ASSESSMENT — VISUAL ACUITY
OU: GLASSES
OU: GLASSES

## 2024-09-23 NOTE — PROGRESS NOTES
"[facility], [place]   Neurosurgery Progress Note:    Date of service: 9/23/2024    Assessment: Judah Leon is a 84 year old male  s/p Thoracic 7-11 Minimally Invasive Percutaneous Posterior instrumented fusion with Dr. Salas Guadalupe on 9/9/2024    Clinically Significant Risk Factors        # DMII: A1C = 8.2 % (Ref range: <5.7 %) within past 6 months   # Overweight: Estimated body mass index is 25.27 kg/m  as calculated from the following:    Height as of this encounter: 1.702 m (5' 7.01\").    Weight as of this encounter: 73.2 kg (161 lb 6 oz).       # Acute Kidney Injury, resolved   # CKD stage 3  # Metabolic encephalopathy, resolved     Plan:  - Serial Neuro exams  - SBP <160  - Pain control: PO oxycodone, tylenol, robaxin and gabapentin   - Activity: up with assist   - Restrictions/Bracing: TLSO when out of bed   - Diet: regular   - DVT prophylaxis: SCDs, subcutaneous heparin   - PT/OT: ARU  - Appreciate Endocrinology assistance regarding blood glucose control  - Bowel regimen  - Encourage liberal oral hydration  - Medically ready for ARU placement   - Appreciate WOC assistance in care of skin tears     Wound Cares:  Staples have been removed.     ASH Muñoz, CNP  Department of Neurosurgery  Pager: [phone]        Interval History:  Patient states pain is controlled this morning.  Voiding normally, passing BM.  Awaiting placement.  Staples removed this morning, when removing surgical dressing the skin began to bleed- WOC consulted.     Objective:   Temp:  [97.7  F (36.5  C)-98.3  F (36.8  C)] 97.7  F (36.5  C)  Pulse:  [75-88] 75  Resp:  [14-16] 16  BP: (115-135)/(66-80) 135/80  SpO2:  [99 %-100 %] 100 %  I/O last 3 completed shifts:  In: 1120 [P.O.:1120]  Out: 650 [Urine:650]    Gen: Appears comfortable, NAD  Wound: Incision, clean, dry, intact without strikethrough  Neurologic:  - Alert & Oriented to person, place, time, and situation  - Follows commands briskly  - " Speech fluent, spontaneous. No aphasia or dysarthria.  - No gaze preference. No apparent hemineglect.  - PERRL, EOMI  - Strong eye closure, jaw clench, and cheek puff  - Face symmetric with sensation intact to light touch  - Palate elevates symmetrically, uvula midline, tongue protrudes midline  - Trapezii and sternocleidomastoid muscles 5/5 bilaterally  - No pronator drift     Del Tr Bi WE WF Gr   R 5 5 5 5 5 5   L 5 5 5 5 5 5    HF KE KF DF PF EHL   R 5 5 5 5 5 5   L 5 5 5 5 5 5     Reflexes 2+ throughout    Sensation intact and symmetric to light touch throughout    LABS  Recent Labs   Lab Test 09/23/24  0428 09/22/24  0550 09/21/24  0728   WBC 7.0 5.6 5.7   HGB 10.2* 10.9* 10.4*   MCV 97 98 97    192 187       Recent Labs   Lab Test 09/23/24  0838 09/23/24  0428 09/23/24  0233 09/22/24  0754 09/22/24  0550 09/21/24  0813 09/21/24  0728   NA  --  137  --   --  137  --  137   POTASSIUM  --  4.7  --   --  4.4  --  4.6   CHLORIDE  --  103  --   --  104  --  104   CO2  --  24  --   --  23  --  24   BUN  --  39.5*  --   --  32.9*  --  33.8*   CR  --  1.57*  --   --  1.68*  --  1.58*   ANIONGAP  --  10  --   --  10  --  9   ROB  --  9.5  --   --  9.7  --  9.5   * 245* 190*   < > 178*   < > 206*    < > = values in this interval not displayed.

## 2024-09-23 NOTE — PLAN OF CARE
Status: S/p Thoracic 7-11 posterior fusion on 9/9 d/t unstable T 8-9 fracture.   Vitals: VSS  Neuros: A&O x4, forgetful. Baseline numbness to BLE; 5/5 strengths throughout with generalized weakness  IV: Right PIV SL  Labs/Electrolytes: draws in the AM  Resp/trach: on RA, denies SOB  Diet: Regular  Bowel status: LBM 9/23, scheduled bowel meds given  : voiding with urgency/frequency via urinal, strains to void  Skin: Posterior back incision covered with primapore, CDI. WOC nurse following.   Pain: Back pain managed with scheduled pain meds and PRN oxy.  Activity: A1/GB/Walker, TLSO when OOB.

## 2024-09-23 NOTE — PROGRESS NOTES
Care Management Follow Up    Length of Stay (days): 18    Expected Discharge Date: 09/24/2024     Concerns to be Addressed:  discharge planning  Patient plan of care discussed at interdisciplinary rounds: Yes     Anticipated Discharge Disposition:  ARU vs return hospital transfer  Anticipated Discharge Services:  ARU vs return hospital transfer  Anticipated Discharge DME:  NA     Patient/family educated on Medicare website which has current facility and service quality ratings:  not today  Education Provided on the Discharge Plan:  not today  Patient/Family in Agreement with the Plan:  LEÓN     Referrals Placed by CM/SW:  NA  Private pay costs discussed: Not applicable     Discussed  Partnership in Safe Discharge Planning  document with patient/family: Not at this time     Handoff Completed: No, handoff not indicated or clinically appropriate at this time     Additional Information:  [phone][facility] spoke with Baudilio. Baudilio states they do not have any beds available today.     1253 Addendum:  [facility], spoke with Jorge Luis. Jorge Luis states they still do not have any beds available.      Next Steps:   RNCC will continue to follow. Will continue to pursue return hospital transfer to the [facility] while ANA pursues rehab placement to facilitate a timely discharge [facility].      [facility] - return transfer agreement in place  Transfer Center: [phone]     Monae Palacios RN, BSN  6A RN Care Coordinator  Ph: [phone]   Floyd: 6A Neuro RNCC

## 2024-09-23 NOTE — CONSULTS
[facility] Nurse Inpatient Assessment     Consulted for: Skin tears on back secondary to dressings      Patient History (according to provider note(s):      Judah Leon is a 84 year old-year-old male with a notable PMHx of stage IIIb chronic kidney disease, diabetes, hypertension, history of prostate cancer, anemia, presenting with unstable T 8-9 fracture. Patient was in his usual state of health and was with his friend on Thursday. Mr. Leon saw his friend start to lose his balance, so he ran to help. Unfortunately, his friend ended up landing on top of the Mr. Leon and fractured his spine at T8-9. He denied loss of consciousness. He denies significant weakness, only endorses sharp pain that begins in his midback and radiates across his abdomen. He denies changes in sensation. He denies incontinence, bowel/bladder symptoms, and saddle anesthesia. [facility]/5/24 for surgical management. Now s/p Thoracic 7-11 Minimally Invasive Percutaneous Posterior instrumented fusion with Dr. Salas Guadalupe on 9/9/2024.     Assessment:      Areas visualized during today's visit: Focused: and back    Wound location: Middle of back below incision    Last photo: 9/23  Wound due to: Medical Adhesive Related Skin Injury (MARSI)  Wound history/plan of care: Pt has wound care orders for daily betadine swab and cover with primapore. This morning the nurse removed the dressing and caused a MARSI below the lower middle incision.  Wound base: 100 % Dermis     Palpation of the wound bed: normal      Drainage: small     Description of drainage: serosanguinous     Measurements (length x width x depth, in cm): 0.5  x 1.7  x  0.1 cm      Tunneling: N/A     Undermining: N/A  Periwound skin: Intact      Color: normal and consistent with surrounding tissue      Temperature: normal   Odor: none  Pain: mild, tender  Pain interventions prior to  dressing change: slow and gentle cares   Treatment goal: Heal , Protection, and Promote epidermal migration  STATUS: initial assessment  Supplies ordered: at bedside, discussed with RN, and discussed with patient       Treatment Plan:     Skin stripping below middle lower surgical incision: Every 3 days Gently cleanse the area with saline and pat dry. Apply no sting barrier film to poonam-wound area and allow to dry. Cover with mepilex. In order to limit MARSI to the area around the remainder of the surgical incisions, apply no sting barrier film to the poonam-wound area and allow to dry. Window pane the surgical incisions with strips of comfeel hydrocolloid dressing. This can remain in place for 3 days. Apply cover dressing with adhesive taped to the hydrocolloid dressing.     Orders: Written    RECOMMEND PRIMARY TEAM ORDER: None, at this time  Education provided: wound progress  Discussed plan of care with: Patient and Nurse  WOC nurse follow-up plan: signing off  Notify WOC if wound(s) deteriorate.  Nursing to notify the Provider(s) and re-consult the WOC Nurse if new skin concern.    DATA:     Current support surface: Standard  Standard gel mattress (Isoflex)  Containment of urine/stool: Incontinent pad in bed  BMI: Body mass index is 25.27 kg/m .   Active diet order: Orders Placed This Encounter      Advance Diet as Tolerated: Regular Diet Adult      Diet     Output: I/O last 3 completed shifts:  In: 360 [P.O.:360]  Out: 450 [Urine:450]     Labs:   Recent Labs   Lab 09/23/24  0428   HGB 10.2*   WBC 7.0     Pressure injury risk assessment:   Sensory Perception: 3-->slightly limited  Moisture: 4-->rarely moist  Activity: 3-->walks occasionally  Mobility: 3-->slightly limited  Nutrition: 3-->adequate  Friction and Shear: 2-->potential problem  Terrence Score: 18    Ranjith Guzman, RN, BEN  WO RN Treatment Specialist  Pager no longer is use, please contact through [facility] group: [facility] Nurse [place]    Dept.  Office Number: [phone]

## 2024-09-23 NOTE — PLAN OF CARE
Status: S/p Thoracic 7-11 posterior fusion on 9/9 d/t unstable T 8-9 fracture.   Vitals: VSS  Neuros: A&O x4, forgetful. Baseline numbness to BLE; 5/5 strengths throughout with generalized weakness  IV: Right PIV SL  Labs/Electrolytes: draws in the AM  Resp/trach: on RA, denies SOB  Diet: Regular  Bowel status: LBM 9/23, scheduled bowel meds given  : voiding with urgency/frequency via urinal, strains to void  Skin: Posterior back incision covered with primapore, CDI.   Pain: Back pain managed with scheduled pain meds and PRN oxycodone x1   Activity: A1/GB/Walker, TLSO when OOB  [facility]: [facility]. SW following for placement to ARU pending prior authorization/bed availability

## 2024-09-23 NOTE — PROGRESS NOTES
Brief Entry:  SW Received a call from Ivonne Mccullough NP who states that the peer to peer with [employer] will take place at 4:30pm today.  Per Ivonne, this was the only time that [employer] had available.    NANI Taveras  Social Work, 6A  Phone:  [phone]  Pager:  [phone]  9/23/2024

## 2024-09-23 NOTE — PROGRESS NOTES
Care Management Follow Up    Length of Stay (days): 18    Expected Discharge Date: 09/24/2024     Concerns to be Addressed:  Discharge planning    Patient plan of care discussed at interdisciplinary rounds: Yes    Anticipated Discharge Disposition:  Acute rehab placement as recommended by  OT and Physical Therapy     Anticipated Discharge Services:  Acute rehab placement as recommended by OT and Physical Therapy  Anticipated Discharge DME:  Not applicable at this time    Patient/family educated on Medicare website which has current facility and service quality ratings:  Yes  Education Provided on the Discharge Plan:  Yes  Patient/Family in Agreement with the Plan:  Yes    Referrals Placed by CM/SW:    Post acute care facility's  Private pay costs discussed: Not applicable at this time    Discussed  Partnership in Safe Discharge Planning  document with patient/family: No     Handoff Completed: To be completed at time of d/c    Additional Information:  SW is following pt for discharge planning. Occupational and Physical Therapy continue to recommend Acute rehab placement.   Patient is zero percent service connected [facility]. Pt remains medically ready for discharge as per Ivonne Mccullough NP.  Pt was declined for admit to the [facility] ARU unit as pt does not have a TBI or spinal cord injury diagnosis. As this is the case, pt will need to use his Medicare benefits for his rehab stay. Pt was referred to [facility]. . Pt's Medicare number is #[id number]. Pt has a [facility] Medicare Advantage Policy, ID: [id number] and Group number: [id number].    Acute rehab placement is being pursued at [facility] and [facility].    ANA received the following voice mails from [employer] ([employer] processes prior approval requests for [facility]):   Received on 9/20/2024 at 6:43pm from Jaz DANIEL, Clinical .  Jaz stated that the [facility] Medical Director reviewed pt and  they are unable to approve pt for a ARU stay at this time.  Jaz stated that it is not a denial.  Jaz stated that they need additional clinical information faxed and or a Peer to Peer.  Jaz stated to call [phone] ext [phone] to schedule the peer to peer and reference case number [id number]  Received on 9/21/2024 at 7:29am from Alana (sp?), Clinical  who stated that the request for ARU is on hold for add'l clinical information.  Alana stated that add'l clinical information could be faxed to [phone] and/or a peer to peer to could be completed by calling [phone] ext [phone].    On 9/20/2024, a fax was received from [facility] indicating that information is needed in order to approve request.    On 9/23/2024 at 9:59am, ANA faxed additional clinical information (9/20/2024 - forward OT/Phyical Therapy, nursing and Neuro Surgery progress notes) to [facility].    On 9/23/2024 at 10:15am, ANA provided [facility]'s contact information to Ivonne Mccullough NP, to complete peer to peer.    Next Steps: Await final decision from [facility] regarding prior approval for recommended ARU stay.    ANA will continue to follow for discharge planning.    NANI Taveras  Social Work, 6A  Phone:  [phone]  Pager:  [phone]  9/23/2024       ERIC Moran

## 2024-09-24 ENCOUNTER — HOSPITAL ENCOUNTER (INPATIENT)
Facility: CLINIC | Age: 84
LOS: 6 days | Discharge: HOME | DRG: 561 | End: 2024-09-30
Attending: STUDENT IN AN ORGANIZED HEALTH CARE EDUCATION/TRAINING PROGRAM | Admitting: STUDENT IN AN ORGANIZED HEALTH CARE EDUCATION/TRAINING PROGRAM
Payer: COMMERCIAL

## 2024-09-24 VITALS
BODY MASS INDEX: 25.33 KG/M2 | TEMPERATURE: 97.8 F | WEIGHT: 161.38 LBS | RESPIRATION RATE: 16 BRPM | HEIGHT: 67 IN | OXYGEN SATURATION: 99 % | SYSTOLIC BLOOD PRESSURE: 116 MMHG | DIASTOLIC BLOOD PRESSURE: 78 MMHG | HEART RATE: 73 BPM

## 2024-09-24 DIAGNOSIS — S82.113S: Primary | ICD-10-CM

## 2024-09-24 DIAGNOSIS — Z98.1 S/P FUSION OF THORACIC SPINE: ICD-10-CM

## 2024-09-24 DIAGNOSIS — H25.12 NUCLEAR SCLEROSIS OF LEFT EYE: ICD-10-CM

## 2024-09-24 DIAGNOSIS — K59.00 CONSTIPATION, UNSPECIFIED CONSTIPATION TYPE: ICD-10-CM

## 2024-09-24 DIAGNOSIS — I67.9 CEREBROVASCULAR DISEASE: Chronic | ICD-10-CM

## 2024-09-24 DIAGNOSIS — N18.32 CHRONIC KIDNEY DISEASE, STAGE 3B (H): ICD-10-CM

## 2024-09-24 PROBLEM — I10 PRIMARY HYPERTENSION: Status: ACTIVE | Noted: 2022-09-01

## 2024-09-24 LAB
ANION GAP SERPL CALCULATED.3IONS-SCNC: 10 MMOL/L (ref 7–15)
BUN SERPL-MCNC: 37.7 MG/DL (ref 8–23)
CALCIUM SERPL-MCNC: 9.7 MG/DL (ref 8.8–10.4)
CHLORIDE SERPL-SCNC: 105 MMOL/L (ref 98–107)
CREAT SERPL-MCNC: 1.49 MG/DL (ref 0.67–1.17)
EGFRCR SERPLBLD CKD-EPI 2021: 45 ML/MIN/1.73M2
ERYTHROCYTE [DISTWIDTH] IN BLOOD BY AUTOMATED COUNT: 13.1 % (ref 10–15)
GLUCOSE BLDC GLUCOMTR-MCNC: 120 MG/DL (ref 70–99)
GLUCOSE BLDC GLUCOMTR-MCNC: 148 MG/DL (ref 70–99)
GLUCOSE BLDC GLUCOMTR-MCNC: 163 MG/DL (ref 70–99)
GLUCOSE BLDC GLUCOMTR-MCNC: 169 MG/DL (ref 70–99)
GLUCOSE BLDC GLUCOMTR-MCNC: 174 MG/DL (ref 70–99)
GLUCOSE SERPL-MCNC: 183 MG/DL (ref 70–99)
HCO3 SERPL-SCNC: 22 MMOL/L (ref 22–29)
HCT VFR BLD AUTO: 30.2 % (ref 40–53)
HGB BLD-MCNC: 10.2 G/DL (ref 13.3–17.7)
MAGNESIUM SERPL-MCNC: 1.6 MG/DL (ref 1.7–2.3)
MCH RBC QN AUTO: 33.1 PG (ref 26.5–33)
MCHC RBC AUTO-ENTMCNC: 33.8 G/DL (ref 31.5–36.5)
MCV RBC AUTO: 98 FL (ref 78–100)
PHOSPHATE SERPL-MCNC: 4.2 MG/DL (ref 2.5–4.5)
PLATELET # BLD AUTO: 187 10E3/UL (ref 150–450)
POTASSIUM SERPL-SCNC: 4.2 MMOL/L (ref 3.4–5.3)
RBC # BLD AUTO: 3.08 10E6/UL (ref 4.4–5.9)
SODIUM SERPL-SCNC: 137 MMOL/L (ref 135–145)
WBC # BLD AUTO: 5.6 10E3/UL (ref 4–11)

## 2024-09-24 PROCEDURE — 80048 BASIC METABOLIC PNL TOTAL CA: CPT | Performed by: NURSE PRACTITIONER

## 2024-09-24 PROCEDURE — 99222 1ST HOSP IP/OBS MODERATE 55: CPT | Performed by: STUDENT IN AN ORGANIZED HEALTH CARE EDUCATION/TRAINING PROGRAM

## 2024-09-24 PROCEDURE — 250N000013 HC RX MED GY IP 250 OP 250 PS 637: Performed by: NURSE PRACTITIONER

## 2024-09-24 PROCEDURE — 36415 COLL VENOUS BLD VENIPUNCTURE: CPT | Performed by: NURSE PRACTITIONER

## 2024-09-24 PROCEDURE — 250N000011 HC RX IP 250 OP 636: Performed by: PHYSICIAN ASSISTANT

## 2024-09-24 PROCEDURE — 250N000009 HC RX 250

## 2024-09-24 PROCEDURE — 250N000013 HC RX MED GY IP 250 OP 250 PS 637

## 2024-09-24 PROCEDURE — 83735 ASSAY OF MAGNESIUM: CPT | Performed by: NURSE PRACTITIONER

## 2024-09-24 PROCEDURE — 250N000013 HC RX MED GY IP 250 OP 250 PS 637: Performed by: PHYSICIAN ASSISTANT

## 2024-09-24 PROCEDURE — 128N000003 HC R&B REHAB

## 2024-09-24 PROCEDURE — 99207 PR SC NO CHARGE VISIT/PATIENT NOT SEEN: CPT

## 2024-09-24 PROCEDURE — 85027 COMPLETE CBC AUTOMATED: CPT | Performed by: NURSE PRACTITIONER

## 2024-09-24 PROCEDURE — 84100 ASSAY OF PHOSPHORUS: CPT | Performed by: NURSE PRACTITIONER

## 2024-09-24 PROCEDURE — 250N000011 HC RX IP 250 OP 636: Performed by: NURSE PRACTITIONER

## 2024-09-24 RX ORDER — CARBOXYMETHYLCELLULOSE SODIUM 5 MG/ML
1 SOLUTION/ DROPS OPHTHALMIC
COMMUNITY
Start: 2024-09-24

## 2024-09-24 RX ORDER — BISACODYL 10 MG
10 SUPPOSITORY, RECTAL RECTAL DAILY PRN
Status: DISCONTINUED | OUTPATIENT
Start: 2024-09-24 | End: 2024-09-30 | Stop reason: HOSPADM

## 2024-09-24 RX ORDER — CALCIUM CARBONATE 500 MG/1
1000 TABLET, CHEWABLE ORAL 3 TIMES DAILY PRN
Status: DISCONTINUED | OUTPATIENT
Start: 2024-09-24 | End: 2024-09-30 | Stop reason: HOSPADM

## 2024-09-24 RX ORDER — DEXTROSE MONOHYDRATE 25 G/50ML
25-50 INJECTION, SOLUTION INTRAVENOUS
Status: DISCONTINUED | OUTPATIENT
Start: 2024-09-24 | End: 2024-09-30 | Stop reason: HOSPADM

## 2024-09-24 RX ORDER — NALOXONE HYDROCHLORIDE 0.4 MG/ML
0.4 INJECTION, SOLUTION INTRAMUSCULAR; INTRAVENOUS; SUBCUTANEOUS
Status: DISCONTINUED | OUTPATIENT
Start: 2024-09-24 | End: 2024-09-30 | Stop reason: HOSPADM

## 2024-09-24 RX ORDER — METFORMIN HCL 500 MG
500 TABLET, EXTENDED RELEASE 24 HR ORAL 2 TIMES DAILY WITH MEALS
Status: DISCONTINUED | OUTPATIENT
Start: 2024-09-24 | End: 2024-09-30 | Stop reason: HOSPADM

## 2024-09-24 RX ORDER — POLYETHYLENE GLYCOL 3350 17 G/17G
17 POWDER, FOR SOLUTION ORAL 2 TIMES DAILY PRN
Status: DISCONTINUED | OUTPATIENT
Start: 2024-09-24 | End: 2024-09-30 | Stop reason: HOSPADM

## 2024-09-24 RX ORDER — METFORMIN HCL 500 MG
500 TABLET, EXTENDED RELEASE 24 HR ORAL 2 TIMES DAILY WITH MEALS
COMMUNITY
Start: 2024-09-24

## 2024-09-24 RX ORDER — FAMOTIDINE 20 MG/1
20 TABLET, FILM COATED ORAL 2 TIMES DAILY
Status: DISCONTINUED | OUTPATIENT
Start: 2024-09-24 | End: 2024-09-30 | Stop reason: HOSPADM

## 2024-09-24 RX ORDER — AMITRIPTYLINE HYDROCHLORIDE 10 MG/1
10 TABLET ORAL EVERY EVENING
Status: DISCONTINUED | OUTPATIENT
Start: 2024-09-24 | End: 2024-09-30 | Stop reason: HOSPADM

## 2024-09-24 RX ORDER — METHOCARBAMOL 500 MG/1
500 TABLET, FILM COATED ORAL 4 TIMES DAILY PRN
Status: DISCONTINUED | OUTPATIENT
Start: 2024-09-24 | End: 2024-09-25

## 2024-09-24 RX ORDER — NALOXONE HYDROCHLORIDE 0.4 MG/ML
0.2 INJECTION, SOLUTION INTRAMUSCULAR; INTRAVENOUS; SUBCUTANEOUS
Status: DISCONTINUED | OUTPATIENT
Start: 2024-09-24 | End: 2024-09-30 | Stop reason: HOSPADM

## 2024-09-24 RX ORDER — GABAPENTIN 400 MG/1
400 CAPSULE ORAL 3 TIMES DAILY
Status: ON HOLD | COMMUNITY
Start: 2024-09-24 | End: 2024-09-30

## 2024-09-24 RX ORDER — ENOXAPARIN SODIUM 100 MG/ML
40 INJECTION SUBCUTANEOUS EVERY 24 HOURS
Status: DISCONTINUED | OUTPATIENT
Start: 2024-09-24 | End: 2024-09-24

## 2024-09-24 RX ORDER — AMOXICILLIN 250 MG
2 CAPSULE ORAL AT BEDTIME
Status: DISCONTINUED | OUTPATIENT
Start: 2024-09-24 | End: 2024-09-30 | Stop reason: HOSPADM

## 2024-09-24 RX ORDER — NICOTINE POLACRILEX 4 MG
15-30 LOZENGE BUCCAL
Status: DISCONTINUED | OUTPATIENT
Start: 2024-09-24 | End: 2024-09-30 | Stop reason: HOSPADM

## 2024-09-24 RX ORDER — HEPARIN SODIUM 5000 [USP'U]/.5ML
5000 INJECTION, SOLUTION INTRAVENOUS; SUBCUTANEOUS EVERY 12 HOURS
Status: DISCONTINUED | OUTPATIENT
Start: 2024-09-24 | End: 2024-09-27

## 2024-09-24 RX ORDER — HYDROXYZINE HYDROCHLORIDE 10 MG/1
10 TABLET, FILM COATED ORAL 3 TIMES DAILY PRN
Status: DISCONTINUED | OUTPATIENT
Start: 2024-09-24 | End: 2024-09-30 | Stop reason: HOSPADM

## 2024-09-24 RX ORDER — CARBOXYMETHYLCELLULOSE SODIUM 5 MG/ML
2 SOLUTION/ DROPS OPHTHALMIC
Status: DISCONTINUED | OUTPATIENT
Start: 2024-09-24 | End: 2024-09-30 | Stop reason: HOSPADM

## 2024-09-24 RX ORDER — LANOLIN ALCOHOL/MO/W.PET/CERES
3 CREAM (GRAM) TOPICAL
Status: DISCONTINUED | OUTPATIENT
Start: 2024-09-24 | End: 2024-09-28

## 2024-09-24 RX ORDER — GLIPIZIDE 2.5 MG/1
2.5 TABLET ORAL
COMMUNITY
Start: 2024-09-24

## 2024-09-24 RX ORDER — SIMVASTATIN 20 MG
40 TABLET ORAL EVERY EVENING
Status: DISCONTINUED | OUTPATIENT
Start: 2024-09-24 | End: 2024-09-30 | Stop reason: HOSPADM

## 2024-09-24 RX ORDER — MULTIPLE VITAMINS W/ MINERALS TAB 9MG-400MCG
1 TAB ORAL DAILY
Status: DISCONTINUED | OUTPATIENT
Start: 2024-09-25 | End: 2024-09-30 | Stop reason: HOSPADM

## 2024-09-24 RX ORDER — VITAMIN B COMPLEX
25 TABLET ORAL DAILY
Status: DISCONTINUED | OUTPATIENT
Start: 2024-09-24 | End: 2024-09-30 | Stop reason: HOSPADM

## 2024-09-24 RX ORDER — ACETAMINOPHEN 325 MG/1
975 TABLET ORAL 3 TIMES DAILY
Status: DISCONTINUED | OUTPATIENT
Start: 2024-09-24 | End: 2024-09-30 | Stop reason: HOSPADM

## 2024-09-24 RX ADMIN — METHOCARBAMOL 500 MG: 500 TABLET ORAL at 07:59

## 2024-09-24 RX ADMIN — Medication: at 20:50

## 2024-09-24 RX ADMIN — AMITRIPTYLINE HYDROCHLORIDE 10 MG: 10 TABLET ORAL at 20:41

## 2024-09-24 RX ADMIN — FAMOTIDINE 20 MG: 20 TABLET ORAL at 20:41

## 2024-09-24 RX ADMIN — ACETAMINOPHEN 975 MG: 325 TABLET ORAL at 20:41

## 2024-09-24 RX ADMIN — GABAPENTIN 400 MG: 300 CAPSULE ORAL at 20:41

## 2024-09-24 RX ADMIN — METFORMIN ER 500 MG 500 MG: 500 TABLET ORAL at 07:59

## 2024-09-24 RX ADMIN — HEPARIN SODIUM 5000 UNITS: 5000 INJECTION, SOLUTION INTRAVENOUS; SUBCUTANEOUS at 20:42

## 2024-09-24 RX ADMIN — HEPARIN SODIUM 5000 UNITS: 5000 INJECTION, SOLUTION INTRAVENOUS; SUBCUTANEOUS at 08:00

## 2024-09-24 RX ADMIN — Medication 1 TABLET: at 07:59

## 2024-09-24 RX ADMIN — SIMVASTATIN 40 MG: 20 TABLET, FILM COATED ORAL at 20:41

## 2024-09-24 RX ADMIN — ACETAMINOPHEN 975 MG: 325 TABLET ORAL at 15:10

## 2024-09-24 RX ADMIN — Medication 25 MCG: at 15:10

## 2024-09-24 RX ADMIN — GABAPENTIN 400 MG: 400 CAPSULE ORAL at 07:59

## 2024-09-24 RX ADMIN — METFORMIN HYDROCHLORIDE 500 MG: 500 TABLET, EXTENDED RELEASE ORAL at 18:42

## 2024-09-24 RX ADMIN — Medication 25 MCG: at 07:59

## 2024-09-24 RX ADMIN — GABAPENTIN 400 MG: 300 CAPSULE ORAL at 15:10

## 2024-09-24 RX ADMIN — FAMOTIDINE 20 MG: 20 TABLET ORAL at 07:59

## 2024-09-24 ASSESSMENT — ACTIVITIES OF DAILY LIVING (ADL)
ADLS_ACUITY_SCORE: 34
DOING_ERRANDS_INDEPENDENTLY_DIFFICULTY: NO
ADLS_ACUITY_SCORE: 23
ADLS_ACUITY_SCORE: 38
DRESSING/BATHING_DIFFICULTY: NO
WALKING_OR_CLIMBING_STAIRS_DIFFICULTY: NO
ADLS_ACUITY_SCORE: 34
ADLS_ACUITY_SCORE: 23
DIFFICULTY_EATING/SWALLOWING: NO
ADLS_ACUITY_SCORE: 34
ADLS_ACUITY_SCORE: 34
WEAR_GLASSES_OR_BLIND: YES
FALL_HISTORY_WITHIN_LAST_SIX_MONTHS: YES
ADLS_ACUITY_SCORE: 38
CHANGE_IN_FUNCTIONAL_STATUS_SINCE_ONSET_OF_CURRENT_ILLNESS/INJURY: YES
ADLS_ACUITY_SCORE: 34
ADLS_ACUITY_SCORE: 23
ADLS_ACUITY_SCORE: 34
CONCENTRATING,_REMEMBERING_OR_MAKING_DECISIONS_DIFFICULTY: NO
ADLS_ACUITY_SCORE: 23
TOILETING_ISSUES: NO
DIFFICULTY_COMMUNICATING: NO
VISION_MANAGEMENT: GLASSES
ADLS_ACUITY_SCORE: 34
ADLS_ACUITY_SCORE: 30
ADLS_ACUITY_SCORE: 34
ADLS_ACUITY_SCORE: 30
ADLS_ACUITY_SCORE: 34
ADLS_ACUITY_SCORE: 30
ADLS_ACUITY_SCORE: 30
NUMBER_OF_TIMES_PATIENT_HAS_FALLEN_WITHIN_LAST_SIX_MONTHS: 2
ADLS_ACUITY_SCORE: 34
HEARING_DIFFICULTY_OR_DEAF: NO

## 2024-09-24 NOTE — PHARMACY-ADMISSION MEDICATION HISTORY
Admission Medication History completed by pharmacist, Bull Douglass on 9/6/24. Please see Pharmacy - Admission Medication History note under previous encounter at [facility]. for information regarding prior to admission medications.    Cielo Da Silva, PharmD   Clinical Pharmacist

## 2024-09-24 NOTE — PROGRESS NOTES
"Care Management Discharge Note    Discharge Date: 9/24/2024       Discharge Disposition:  [facility] ([phone])    Discharge Services: Acute rehab placement at [facility]    Discharge DME:  Not applicable at this time      Discharge Transportation:    ANA spoke with pt's floor nurse (Leticia) who indicates that pt can transport via w/c, pt can pivot transfer, pt can maintain an upright seated position and pt does not require supervision for behavior during transport.  ANA arranged for [facility] ([employer] [phone]) to provide w/c transport with a service window  time of 12:08pm - 12:53pm    Private pay costs discussed: Not applicable at this time    Does the patient's insurance plan have a 3 day qualifying hospital stay waiver?  No    PAS Confirmation Code:   Not required as pt is admitting to acute rehab setting  Patient/family educated on Medicare website which has current facility and service quality ratings:  Yes    Education Provided on the Discharge Plan:  Yes  Persons Notified of Discharge Plans: Pt, pt's wife (Yolette),  pt's son (Eric), 6A nursing and Cash Crews NP  Patient/Family in Agreement with the Plan:  Yes    Handoff Referral Completed: Yes, non-[facility] PCP: External handoff communication completed    Additional Information:  - Cash Crews NP confirmed readiness for discharge  - Admissions ([place]) at [facility] confirmed acceptance for admit  - ANA spoke with Admissions at [facility] ([place]) who states that they are full on 9/24 and 9/25/2024 and thus are not able to accommodate pt for admit  - ANA received \"Notice of Coverage\" approval from [employer] ([employer] processes prior approval requests for [employer]) for pt's acute rehab stay auth number: [id number].  ANA faxed a copy of the \"Notice of Coverage Approval \" to HIM for scanning into EPIC. ANA placed the original document in pt's [facility] chart.  ANA provided a copy of the document to [facility] Admissions ([place]).    Elke " NANI Shultz  Social Work, 6A  Phone:  [phone]  Pager:  [phone]  9/24/2024       ERIC Moran

## 2024-09-24 NOTE — H&P
[facility]   Acute Rehabilitation Unit  Admission History and Physical    CHIEF COMPLAINT   Back pain after surgery     HISTORY OF PRESENT ILLNESS  Judah Leon is a 84 year old male with a PMH of right cerebral hemorrhage, CKD stage 3, DM2 on insulin, HLD, malignant prostate cancer, and HTN who was admitted on 9/5/24 for management of unstable T8-9 fracture.    He was stable until he lost his balance and sustained a T8-9 fracture as a result of his fall due to his friend falling on top of him.  He endorses hitting his face when he fell without loss of consciousness.  When admitted, he denied weakness, incontinence, or saddle anesthesia but endorsed sharp midback pain.  CT Thoracic spine found minimally distracted fractures at T8-9 and L1 compression fracture.  He underwent T7-11 posterior fusion with Dr. Salas Guadalupe on 9/9/24.  Post-operative course was notable for hyperglycemia managed by endocrine - metformin was restarted.    During his acute hospitalization, the patient was seen and evaluated by  PT, OT, SLP and PM&R consult service.  All specialties collectively recommended that patient would benefit from ongoing therapies in the acute inpatient rehabilitation setting.     On admission to rehab, he endorses pain localized to the mid and lower back.  Pain doesn't radiate down the legs.  He has a slightly increased sense of urinary retention, but otherwise bowel and bladder habits are normal for him.  He endorses newly getting confused at night, with some disorienting episodes where he doesn't know where he is.  He is concerned that his confusion might be related to hitting his head when he fell.  He denies new changes in vision or new headache.  He has trouble concentrating when reading.  He has chronic tinnitus that hasn't changed.    Currently, the patient is medically appropriate and is assessed to have needs and will benefit from an inpatient acute rehabilitation  comprehensive program working with PT, OT and SLP, and will also benefit from supervision and management of Rehab Nursing and Rehab MD.       FUNCTIONAL HISTORY   Pt was modified independent with all ADLs/IADLs, transfers, mobility and gait.  He used a cane for ambulation.    Currently, the patient requires max assist with upper body dressing, mod assist for lower body dressing, transfers, sit/stand, and ambulation, and supervision for other ADLs and mobility. Tub transfers were not attempted.  He was also noted to have impaired cognition with 18/30 on SLUMS on 9/18/24.    PAST MEDICAL HISTORY  Past Medical History:   Diagnosis Date    Cerebrovascular disease 06/07/2003    Formatting of this note might be different from the original.   R sided bleed 1997      Chronic kidney disease, stage 3b (H) 09/24/2024    Chronic rhinitis 08/14/2006    Controlled type 2 diabetes mellitus with stage 3 chronic kidney disease, without long-term current use of insulin (H) 06/07/2003    Formatting of this note might be different from the original.   DM Type2      Hemorrhage of gastrointestinal tract 08/14/2006    Formatting of this note might be different from the original.   2002      Hyperlipidemia with target LDL less than 100 08/12/2011    Formatting of this note might be different from the original.   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   Added automatically from request for surgery [id number]      Osteoarthritis of lumbar spine 07/03/2014    Formatting of this note might be different from the original.   DJD (degenerative joint disease) of lumbar spine      Primary hypertension 09/01/2022    S/P fusion of thoracic spine 09/09/2024    Thoracic compression fracture (H) 09/05/2024       SURGICAL HISTORY  Past Surgical History:   Procedure Laterality Date    OPTICAL TRACKING SYSTEM  FUSION SPINE POSTERIOR THORACIC PERCUTANEOUS THREE+ LEVELS N/A 9/9/2024    Procedure: o-arm/stealth assisted Thoracic 7-11 Minimally Invasive Percutaneous Posterior instrumented fusion with Bone Morphogenic Protein and Allograft;  Surgeon: Salas Guadalupe MD;  Location:  OR       SOCIAL HISTORY  Marital Status:   Living situation: lives in home with spouse, split level, 7 up and down, with railing.  Main living upstairs.  He has a grab bar by the bathtub.  Family support: His wife should be able to provide physical assistance reliably, but he and his family may need assistance with transportation as there has been difficulty reliably getting to the hospital.  Vocational History: Retired currently, gave up golfing.  Tobacco use: The patient reports that he has never smoked. He has never used smokeless tobacco.  Alcohol use: The patient reports current alcohol use of about 2.0 standard drinks of alcohol per week.  Illicit drug use: Denies    FAMILY HISTORY  No family history on file.    MEDICATIONS  Medications Prior to Admission   Medication Sig Dispense Refill Last Dose    amitriptyline (ELAVIL) 10 MG tablet Take 1 tablet (10 mg) by mouth every evening.   9/23/2024 at 2204    carboxymethylcellulose PF (REFRESH PLUS) 0.5 % ophthalmic solution Place 1 drop into both eyes every hour as needed for dry eyes.   9/23/2024 at 1413    gabapentin (NEURONTIN) 400 MG capsule Take 1 capsule (400 mg) by mouth 3 times daily.   9/24/2024 at 0759    glipiZIDE 2.5 MG TABS Take 2.5 mg by mouth daily (with breakfast).   Unknown    insulin aspart (NOVOLOG PEN) 100 UNIT/ML pen Inject 1-10 Units subcutaneously 3 times daily (before meals).   9/24/2024 at 1208    [START ON 9/25/2024] insulin glargine (LANTUS PEN) 100 UNIT/ML pen Inject 11 Units subcutaneously every 24 hours.   9/24/2024 at 0900    metFORMIN (GLUCOPHAGE XR) 500 MG 24 hr tablet Take 1 tablet (500 mg) by mouth 2 times daily (with meals).   9/24/2024 at 0759    metFORMIN  "(GLUCOPHAGE XR) 500 MG 24 hr tablet Take 1 tablet (500 mg) by mouth 2 times daily (with meals).   9/24/2024 at 0759    methocarbamol (ROBAXIN) 500 MG tablet Take 1 tablet (500 mg) by mouth 4 times daily as needed for muscle spasms.   9/24/2024 at 0759    multivitamin w/minerals (THERA-VIT-M) tablet Take 1 tablet by mouth daily.   9/24/2024 at 0759    oxyCODONE (ROXICODONE) 5 MG tablet Take 1 tablet (5 mg) by mouth every 6 hours as needed for moderate to severe pain. 20 tablet 0 9/22/2024 at 2313    polyethylene glycol (MIRALAX) 17 g packet Take 17 g by mouth 2 times daily.   9/23/2024 at 2006    senna-docusate (SENOKOT-S/PERICOLACE) 8.6-50 MG tablet Take 2 tablets by mouth 2 times daily.   9/23/2024 at 2005    simvastatin (ZOCOR) 40 MG tablet Take 1 tablet (40 mg) by mouth every evening.   9/23/2024 at 2204    Vitamin D3 (CHOLECALCIFEROL) 25 mcg (1000 units) tablet Take 1 tablet (25 mcg) by mouth daily.   9/24/2024 at 0759    acetaminophen (TYLENOL) 325 MG tablet Take 2 tablets (650 mg) by mouth every 4 hours as needed for other (For optimal non-opioid multimodal pain management to improve pain control.).   9/21/2024 at 0848       ALLERGIES  No Known Allergies    REVIEW OF SYSTEMS  A 10 point ROS was performed and negative unless otherwise noted in HPI.     PHYSICAL EXAM  VITAL SIGNS:  /65 (BP Location: Right arm)   Pulse 72   Temp 97.4  F (36.3  C) (Oral)   Resp 18   Wt 74.4 kg (164 lb 1.6 oz)   BMI 25.70 kg/m    BMI:  Estimated body mass index is 25.7 kg/m  as calculated from the following:    Height as of 9/7/24: 1.702 m (5' 7.01\").    Weight as of this encounter: 74.4 kg (164 lb 1.6 oz).     General: NAD, pleasant and cooperative  HEENT: ATNC, oropharynx clear with MMM.  No signs of bruising or deformity on nose or face.  Pulmonary: clear breath sounds b/l, no rales/wheezing  Cardiovascular: RRR, no murmur  Abdominal: soft, non-tender, non-distended  Extremities: warm, well perfused, no edema in " bilateral lower extremities, no tenderness in calves  MSK/neuro:  Mental Status:  alert and oriented x3  Cranial Nerves:  2nd CN: Pupils equal, round, reactive to light and accomodation. and visual fields intact to confrontation.   3rd,4th,6th CN:  EOMI, appropriate pupillary responses  5th CN: facial sensation symmetric to light touch   7th CN: face symmetrical   8th CN: decreased hearing on right  9th, 10th CN: palate elevates symmetrically   11th CN: sternocleidomastoids and trapezii strong   12th CN: tongue midline and without fasciculations    Sensory: Symmetric to light touch in bilateral upper and lower extremities  Strength:   SF EF EE WE G I HF KE DF EHL PF  R 5/5 5/5 5/5 5/5 5/5 5/5 4/5 5/5 5/5 5/5 5/5  L 5/5 5/5 5/5 5/5 5/5 5/5 5/5 5/5 5/5 5/5 5/5  Reflexes: Present and symmetric  Lo's test: negative bilaterally  Babinski reflex: downgoing bilaterally  Abnormal movements: None  Coordination: Slight tremor on finger/nose bilaterally, normal heel/shin  Speech: No word finding difficulties, good comprehension  Cognition: Goal oriented thought, good insight  Gait: Not tested  Skin: Limited exam, chest covered by TLSO    LABS AND IMAGING  Recent Labs   Lab Test 09/24/24  1156 09/24/24  0736 09/24/24  0601   NA  --   --  137   POTASSIUM  --   --  4.2   CHLORIDE  --   --  105   CO2  --   --  22   BUN  --   --  37.7*   CR  --   --  1.49*   *   < > 183*   GFRESTIMATED  --   --  45*    < > = values in this interval not displayed.     Recent Labs   Lab Test 09/24/24  0601   WBC 5.6   RBC 3.08*   HGB 10.2*   MCV 98   MCH 33.1*   MCHC 33.8   RDW 13.1        Recent Labs   Lab Test 09/08/24  1046   INR 1.10     XR Chest 2 Views  Result Date: 9/13/2024  Impression: 1. Ribs are unremarkable, no definite findings to extend the patient's symptoms. Partially visualized known T9 Chance fracture, further characterized on prior imaging. 2. No focal pulmonary consolidations. 3. Degenerative changes of the  thoracic spine.    XR Thoracic Spine 3 Views  Result Date: 9/10/2024  Impression: 1.  Post surgical changes of T7-T11 posterior fusion without evidence of hardware complications. 2.  No acute osseous abnormality. ROSEMARY KNAPP MD   SYSTEM ID:  [id number]    CT Thoracic Spine w/o Contrast  Result Date: 9/6/2024  Impression: 1. Minimally distracted fracture at through the T8 and T9 vertebral bodies that is mildly widened anteriorly, and extends to the posterior elements without displacement. 2. There is a compression fracture at L1 with 50 % height loss. 3. Calcified flowing syndesmophytes anteriorly. 4. Moderate right greater than left neural foraminal stenosis from T7-T9. 5. Multilevel osteophytosis, vertebral disc arthropathy, and disc height loss.    ASSESSMENT/PLAN:  Judah Leon is a 84 year old right hand dominant male with a PMH of right cerebral hemorrhage, CKD stage 3, DM2 on insulin, HLD, malignant prostate cancer, and HTN who sustained a T8-9 minimally displaced fracture and L1 compression fracture from a fall on 9/5/24 s/p T7-11 posterior fusion on 9/9/24 with a slow recovery due to post-operative pain with no overt neurological damage.  He was admitted to acute inpatient rehab on  9/24/24.    Impairment group code: 08.9 unstable T8-9 fractures    Rehabilitation Plan  PT, OT and SLP 60 minutes of each on a daily basis, in addition to rehab nursing and close management of physiatrist.    Impairment of ADL's: OT for 60 min daily to work on upper and lower body self care, dressing, toileting, bathing, energy conservation techniques with use of ADs as needed.   Impairment of mobility:  PT for 60 min daily to work on gait exercises, strengthening, endurance buildup, transfers with use of walker as needed.   Impairment of cognition/language/swallow:  SLP for 60 min daily for cognitive evaluation and treatment strategies for higher level cognitive deficits and memory impairment.   Rehab RN to administer  medication, patient education on medication taking, VS monitoring, bowel regimen, glucose monitoring and wound care/surgical wound dressing changes and monitoring.   Adjustment to disability:  Clinical psychology to eval and treat if needed    Medical Conditions    Rehab  #Postoperative back Pain limiting mobility and ADLs  #Impaired cognition  - acetaminophen 975mg TID  - gabapentin 400mg TID, consider reducing as this may be affecting cognition  - hydroxyzine 10mg  - ocyxodone 2.5mg BID PRN, look to wean as this may be affecting cognition and confusion  - methocarbamol 500mg QID PRN spasms, consider discontinuing  - PT, OT, and SLP  - Clinical Psychology    #Bowel  - plyethylene glycol 17grams BID PRN  - senna/docusate 2 tabs at bedtime    #Bladder  - monitor on admit    #Depression  - amitriptyline 10mg every evening    #Sleep  - melatonin 3mg bedtime PRN    Neuro  #Head injury during fall  With 18 score on SLUMS and endorsement of confusion along with remote history of right head bleed, right SDH is on the differential.  However, no focal neurological signs.  - low threshold for head imaging if altered mental status or new focal neurological signs    Ortho  #S/P Posterior thoracic fusion  T7-11 fusion performed 9/9/24.  Staples removed.  - leave wound open to air said per surgery, but orders are currently to keep wound covered except when changing dressing until seen in clinic by Dr. Guadalupe.  No showering or submerging.  Will default to changing dressings daily and no showering.  - precautions, thoracic lumbar spine.  TLSO when out of bed.  No bending or twisting back when repositioning  - precautions: no bending, twisting, or lifting > 10lbs.  Clarify duration outpatient    CV  #HLD  - simvastatin 40mg every evening    Endo  #DM2  History of hyperglycemia during hospitalization.  Last A1c 8.2%.  - hypoglycemia protocol  - carb counting  - FSG QID  - insulin SSI high  - aspart 1 unit per 12 grams carb TID with  meals  - glargine 11 units q24h  - metofrmin 500mg BID with meals    FEN/GI  #Diet  - regular with thin liquids  - calcium carbonate 1000mg TID PRN  - encourage hydration  - multivitamin daily    #Vitamin D deficiency  - vitamin D3 1000 units daily    Ppx  #GI: famotidine 20mg BID  #DVT: heparin 500 units Q12H, clarify duration with orthopedics    Other  #Dry eyes: artificial tears, carboxymethylcellulose PF 0.5% 2 drop both eyes q1h prn  #CKD: Monitor with BMP, encourage hydration    Disposition  Code status: Full code  ELOS: 8 days    #Follow Up  - [facility]  - Neurosurgery follow up, need to reschedule after ARU  - nephrology follow up 10/11/24    Leonides Lowe MD    I spent a total of 60 minutes on the date of service doing chart review, history and exam, documentation, and further activities as noted above.

## 2024-09-24 NOTE — PROVIDER NOTIFICATION
FYI- [id number] Leon  Patient reports increased difficulty reading for 2 days. Neurosurgery was notified just curious if they had met with patient.    Thanks,  Haider Larsen RN  Callback: [phone]    Sent to Neurosurgery @2358

## 2024-09-24 NOTE — PLAN OF CARE
Occupational Therapy Discharge Summary    Reason for therapy discharge:    Discharged to acute rehabilitation facility.    Progress towards therapy goal(s). See goals on Care Plan in [facility] electronic health record for goal details.  Goals partially met.  Barriers to achieving goals:   discharge from facility.    Therapy recommendation(s):    Continued therapy is recommended.  Rationale/Recommendations:  recommend continued skilled OT services to progress IND and safety with ADLs. Pt currently requiring CGA-Lydia with Fww for ADLs, demonstrates impaired dynamic balance, and decreased safety awareness.

## 2024-09-24 NOTE — PROGRESS NOTES
uJdah arrived on at ARU at 13:30  2 RN skin assessment: completed by Aiden RN, Nicol RN   Result of skin assessment and interventions/actions: Spinal incision and skin tear R of spinal incision   Height, weight: completed? Yes   PTA meds completed, pt was oriented to the unit, call light in reach, no SOB reported, No chest pain reported, bed in lowest position, able to make needs known.

## 2024-09-24 NOTE — PLAN OF CARE
Assumed cares: 1900-0730    Status: S/p Thoracic 7-11 posterior fusion on 9/9 d/t unstable T 8-9 fracture.   Vitals: VSS  Neuros: A&Ox4, forgetful. Baseline numbness to BLE; 5/5 strengths throughout with generalized weakness.  IV: Right PIV SL  Labs/Electrolytes: Draws in the AM  Resp/trach: on RA, denies SOB  Diet: Regular  Bowel status: LBM stated 9/23, scheduled bowel meds given.  : voiding with urgency/frequency via urinal, strains to void  Skin: Posterior back incision covered with primapore, CDI. WOC nurse following.   Pain: Back pain managed with scheduled pain meds and PRN oxy.  Activity: A1/GB/Walker, TLSO when OOB.  Social: No family present at bedside.  [facility]. Social work following for placement to ARU pending prior authorization. Will continue to monitor.          Spine Assessment: 1900-0730  Patient ambulation status: A1/GB/Walker  Patient able to stand for X-ray:Yes  Standing/upright x-ray complete: Yes  Patient using oral analgesics:no  Voiding spontaneously:yes  Drains discontinued:yes  Incision clean and dry:yes  Bowel status:LBM 9/23

## 2024-09-24 NOTE — PROGRESS NOTES
Brief Diabetes Note:    Person requesting consult:  Primary team requesting holli recommendations given elevation of blood sugars and risk of surgical site infection. IDS previously saw pt and signed off 9/13/24. Also holli consulted 9/18/24.     HPI: Judah Leon is a 84 year old-year-old male with a notable PMHx of stage IIIb chronic kidney disease, diabetes, hypertension, history of prostate cancer, anemia, presenting with unstable T 8-9 fracture. Patient was in his usual state of health and was with his friend on Thursday. Mr. Leon saw his friend start to lose his balance, so he ran to help. Unfortunately, his friend ended up landing on top of the Mr. Leon and fractured his spine at T8-9. He denied loss of consciousness. He denies significant weakness, only endorses sharp pain that begins in his midback and radiates across his abdomen. He denies changes in sensation. He denies incontinence, bowel/bladder symptoms, and saddle anesthesia. [facility] for surgical management. Now s/p Thoracic 7-11 Minimally Invasive Percutaneous Posterior instrumented fusion with Dr. Salas Guadalupe on 9/9/2024.    PTA DM Regimen: Metformin 500mg BID, Glipizide 2.5mg daily    Current inpatient DM Regimen:   -  Lantus 11 units q 24 hrs at 0900   -  Novolog Meal Coverage: 1 units per 12 g CHO, TID AC and PRN with snacks/supplements   - Novolog Correction Scale: 1:25 >140 TID AC (high resistance), 1:25>200 HS (high resistance)  - Metformin XR 500mg BID    Steroids/procedures: none    Labs:       Assessment:   Type 2 Diabetes Mellitus, complicated by NPDR and nephropathy (CKD). Good control for age and comorbidities, A1c 8.2% (9/9/24).  CKD stage III    Discussion with primary team: Reasonable glycemic control on current regimen given age. Did have a tight sugar of 93 on 9/21 after a meal. Would not change carb ratio at this time due to this. Unknown to our team insulin  capacity of ARU. Would recommend discharge orders as below.    Per ADA guidelines, treatment goals and recommendations for older adults with DM and medically complexity is less stringent BG control/A1c for safety - targets of 110-180 mg/dL and up to 250 mg/dL reasonable.  Avoid reliance on A1c.  Glucose decisions should be based on avoidance of hypoglycemia and symptomatic hyperglycemia.     Recommendations:   -  Lantus 11 units q 24 hrs at 0900 --> ok to continue at discharge.  - Novolog Meal Coverage: 1 units per 12 g CHO, TID AC and PRN with snacks/supplements --> at discharge, discontinue and start Glipizide 7.5 mg daily with breakfast (can be increased as needed at ARU)  - Novolog Correction Scale: 1:25 >140 TID AC (high resistance), 1:25 >200 HS (high resistance) --> can continue at discharge to ARU (would not continue at home)  - Metformin XR 500mg BID --> continue at discharge to ARU.  - BG Monitoring: TID AC, HS, 0200 --> Continue at ARU. For home, has meter, keep checking fasting AM daily.   - Hypoglycemia protocol  - Carb counting protocol   - Outpatient Follow-up: [facility]CP and [facility].     Inpatient Diabetes will not see this patient for a full consult. Please call back with any questions, labile blood glucose readings, changes to steroid plan, changes to nutrition plan, or need for updated glycemic control recommendations (please allow for 24-48 hours for this).     Irina Merida PA-C  Inpatient Diabetes Service  Available on [employer]      To contact Inpatient Diabetes Service:     7 AM - 5 PM: Page the [employer] GERA following the patient that day (see filed or incomplete progress notes/consult notes under Endocrinology)    OR if uncertain of provider assignment: page job code [phone]    5 PM - 7 AM: First call after hours is to primary service.    For urgent after-hours questions, page job code for on call fellow: [phone]

## 2024-09-24 NOTE — PROGRESS NOTES
"Care Management Follow Up    Length of Stay (days): 19    Expected Discharge Date: 9/24/2024     Concerns to be Addressed:  Discharge planning     Patient plan of care discussed at interdisciplinary rounds: Yes    Anticipated Discharge Disposition:  Acute rehab placement     Anticipated Discharge Services:  Acute rehab placement  Anticipated Discharge DME:  Not applicable at this time    Patient/family educated on Medicare website which has current facility and service quality ratings:  Yes  Education Provided on the Discharge Plan:  Yes  Patient/Family in Agreement with the Plan:  Yes    Referrals Placed by CM/SW:  Post acute care facility's  Private pay costs discussed: Not applicable at this time    Discussed  Partnership in Safe Discharge Planning  document with patient/family: No     Handoff Completed: To be completed at time of d/c    Additional Information:  ANA is following pt for discharge planning. Occupational and Physical Therapy continue to recommend Acute rehab placement.   Patient is zero percent service connected [facility] per [facility] PEREZ. Pt remains medically ready for discharge as per Ivonne Mccullough NP.  Pt was declined for admit to the [facility] ARU unit as pt does not have a TBI or spinal cord injury diagnosis. As this is the case, pt will need to use his Medicare benefits for his rehab stay. Pt was referred to [facility]. . Pt's Medicare number is #[id number]. Pt has a [facility] [[email or web address]] Medicare Advantage Policy, ID: [id number] and Group number: [id number].     Acute rehab placement is being pursued at [facility] and [facility].    ANA received \"Notice of Coverage Approval\" from [employer] processes prior approval requests for [employer]) for pt's acute rehab stay.      ANA phoned Admissions at [facility] and [facility] this am and left messages informing that prior auth from insurance has been received.  ANA requested that both call to indicate whether or not " they can accept pt for admit today.      Next Steps: Await return calls from [facility] and [facility].    SW will continue to follow for discharge planning.    NANI Taveras  Social Work, 6A  Phone:  [phone]  Pager:  [phone]  9/24/2024       ERIC Moran

## 2024-09-24 NOTE — PROGRESS NOTES
"[facility], [place]   Neurosurgery Progress Note:    Date of service: 9/24/2024    Assessment: Judah Leon is a 84 year old male  s/p Thoracic 7-11 Minimally Invasive Percutaneous Posterior instrumented fusion with Dr. Salas Guadalupe on 9/9/2024    Clinically Significant Risk Factors        # DMII: A1C = 8.2 % (Ref range: <5.7 %) within past 6 months   # Overweight: Estimated body mass index is 25.27 kg/m  as calculated from the following:    Height as of this encounter: 1.702 m (5' 7.01\").    Weight as of this encounter: 73.2 kg (161 lb 6 oz).       # Acute Kidney Injury, resolved   # CKD stage 3  # Metabolic encephalopathy, resolved     Plan:  - Serial Neuro exams  - SBP <160  - Pain control: PO oxycodone, tylenol, robaxin and gabapentin   - Activity: up with assist   - Restrictions/Bracing: TLSO when out of bed   - Diet: regular   - DVT prophylaxis: SCDs, subcutaneous heparin   - PT/OT: ARU  - Appreciate Endocrinology assistance regarding blood glucose control  - Bowel regimen  - Encourage liberal oral hydration  - Medically ready for ARU placement        Wound Cares:  Staples have been removed.  No need for daily dressing dressing.  Please leave wound open to air given sensitive skin and recent skin tears secondary to adhesive     ASH Muñoz, CNP  Department of Neurosurgery  Pager: [phone]        Interval History:  Patient states pain is controlled this morning. Peer to Peer completed last evening, patient will hopefully have placement at ARU soon. Voiding and passing BM.      Objective:   Temp:  [97.4  F (36.3  C)-98  F (36.7  C)] 97.4  F (36.3  C)  Resp:  [16] 16  BP: (120-135)/(64-74) 130/64  SpO2:  [100 %] 100 %  I/O last 3 completed shifts:  In: -   Out: 750 [Urine:750]    Gen: Appears comfortable, NAD  Wound: Incision, clean, dry, intact without strikethrough  Neurologic:  - Alert & Oriented to person, place, time, and situation  - Follows commands briskly  - " Speech fluent, spontaneous. No aphasia or dysarthria.  - No gaze preference. No apparent hemineglect.  - PERRL, EOMI  - Strong eye closure, jaw clench, and cheek puff  - Face symmetric with sensation intact to light touch  - Palate elevates symmetrically, uvula midline, tongue protrudes midline  - Trapezii and sternocleidomastoid muscles 5/5 bilaterally  - No pronator drift     Del Tr Bi WE WF Gr   R 5 5 5 5 5 5   L 5 5 5 5 5 5    HF KE KF DF PF EHL   R 5 5 5 5 5 5   L 5 5 5 5 5 5     Reflexes 2+ throughout    Sensation intact and symmetric to light touch throughout    LABS  Recent Labs   Lab Test 09/24/24  0601 09/23/24  0428 09/22/24  0550   WBC 5.6 7.0 5.6   HGB 10.2* 10.2* 10.9*   MCV 98 97 98    177 192       Recent Labs   Lab Test 09/24/24  0736 09/24/24  0601 09/24/24  0212 09/23/24  0838 09/23/24  0428 09/22/24  0754 09/22/24  0550   NA  --  137  --   --  137  --  137   POTASSIUM  --  4.2  --   --  4.7  --  4.4   CHLORIDE  --  105  --   --  103  --  104   CO2  --  22  --   --  24  --  23   BUN  --  37.7*  --   --  39.5*  --  32.9*   CR  --  1.49*  --   --  1.57*  --  1.68*   ANIONGAP  --  10  --   --  10  --  10   ROB  --  9.7  --   --  9.5  --  9.7   * 183* 169*   < > 245*   < > 178*    < > = values in this interval not displayed.

## 2024-09-25 ENCOUNTER — APPOINTMENT (OUTPATIENT)
Dept: SPEECH THERAPY | Facility: CLINIC | Age: 84
DRG: 561 | End: 2024-09-25
Attending: STUDENT IN AN ORGANIZED HEALTH CARE EDUCATION/TRAINING PROGRAM
Payer: COMMERCIAL

## 2024-09-25 ENCOUNTER — APPOINTMENT (OUTPATIENT)
Dept: PHYSICAL THERAPY | Facility: CLINIC | Age: 84
DRG: 561 | End: 2024-09-25
Attending: STUDENT IN AN ORGANIZED HEALTH CARE EDUCATION/TRAINING PROGRAM
Payer: COMMERCIAL

## 2024-09-25 ENCOUNTER — APPOINTMENT (OUTPATIENT)
Dept: OCCUPATIONAL THERAPY | Facility: CLINIC | Age: 84
DRG: 561 | End: 2024-09-25
Attending: STUDENT IN AN ORGANIZED HEALTH CARE EDUCATION/TRAINING PROGRAM
Payer: COMMERCIAL

## 2024-09-25 LAB
GLUCOSE BLDC GLUCOMTR-MCNC: 116 MG/DL (ref 70–99)
GLUCOSE BLDC GLUCOMTR-MCNC: 126 MG/DL (ref 70–99)
GLUCOSE BLDC GLUCOMTR-MCNC: 129 MG/DL (ref 70–99)
GLUCOSE BLDC GLUCOMTR-MCNC: 144 MG/DL (ref 70–99)
GLUCOSE BLDC GLUCOMTR-MCNC: 162 MG/DL (ref 70–99)

## 2024-09-25 PROCEDURE — 97161 PT EVAL LOW COMPLEX 20 MIN: CPT | Mod: GP

## 2024-09-25 PROCEDURE — 250N000011 HC RX IP 250 OP 636: Performed by: PHYSICIAN ASSISTANT

## 2024-09-25 PROCEDURE — 99232 SBSQ HOSP IP/OBS MODERATE 35: CPT | Performed by: PHYSICIAN ASSISTANT

## 2024-09-25 PROCEDURE — 128N000003 HC R&B REHAB

## 2024-09-25 PROCEDURE — 250N000013 HC RX MED GY IP 250 OP 250 PS 637

## 2024-09-25 PROCEDURE — 97535 SELF CARE MNGMENT TRAINING: CPT | Mod: GO

## 2024-09-25 PROCEDURE — 97530 THERAPEUTIC ACTIVITIES: CPT | Mod: GP

## 2024-09-25 PROCEDURE — 250N000012 HC RX MED GY IP 250 OP 636 PS 637: Performed by: PHYSICIAN ASSISTANT

## 2024-09-25 PROCEDURE — 250N000012 HC RX MED GY IP 250 OP 636 PS 637

## 2024-09-25 PROCEDURE — 96125 COGNITIVE TEST BY HC PRO: CPT | Mod: GN | Performed by: SPEECH-LANGUAGE PATHOLOGIST

## 2024-09-25 PROCEDURE — 250N000013 HC RX MED GY IP 250 OP 250 PS 637: Performed by: PHYSICIAN ASSISTANT

## 2024-09-25 PROCEDURE — 97165 OT EVAL LOW COMPLEX 30 MIN: CPT | Mod: GO

## 2024-09-25 RX ORDER — GABAPENTIN 300 MG/1
300 CAPSULE ORAL 3 TIMES DAILY
Status: DISCONTINUED | OUTPATIENT
Start: 2024-09-25 | End: 2024-09-27

## 2024-09-25 RX ORDER — MAGNESIUM OXIDE 400 MG/1
400 TABLET ORAL DAILY
Status: DISCONTINUED | OUTPATIENT
Start: 2024-09-26 | End: 2024-09-30 | Stop reason: HOSPADM

## 2024-09-25 RX ORDER — METHOCARBAMOL 500 MG/1
500 TABLET, FILM COATED ORAL 2 TIMES DAILY PRN
Status: DISCONTINUED | OUTPATIENT
Start: 2024-09-25 | End: 2024-09-30 | Stop reason: HOSPADM

## 2024-09-25 RX ADMIN — INSULIN ASPART 1 UNITS: 100 INJECTION, SOLUTION INTRAVENOUS; SUBCUTANEOUS at 08:43

## 2024-09-25 RX ADMIN — ACETAMINOPHEN 975 MG: 325 TABLET ORAL at 08:28

## 2024-09-25 RX ADMIN — GABAPENTIN 300 MG: 300 CAPSULE ORAL at 14:12

## 2024-09-25 RX ADMIN — ACETAMINOPHEN 975 MG: 325 TABLET ORAL at 14:13

## 2024-09-25 RX ADMIN — SIMVASTATIN 40 MG: 20 TABLET, FILM COATED ORAL at 20:40

## 2024-09-25 RX ADMIN — HEPARIN SODIUM 5000 UNITS: 5000 INJECTION, SOLUTION INTRAVENOUS; SUBCUTANEOUS at 20:40

## 2024-09-25 RX ADMIN — GABAPENTIN 400 MG: 300 CAPSULE ORAL at 08:28

## 2024-09-25 RX ADMIN — FAMOTIDINE 20 MG: 20 TABLET ORAL at 08:28

## 2024-09-25 RX ADMIN — SENNOSIDES AND DOCUSATE SODIUM 2 TABLET: 50; 8.6 TABLET ORAL at 20:40

## 2024-09-25 RX ADMIN — Medication 1 TABLET: at 08:28

## 2024-09-25 RX ADMIN — GABAPENTIN 300 MG: 300 CAPSULE ORAL at 20:39

## 2024-09-25 RX ADMIN — METFORMIN HYDROCHLORIDE 500 MG: 500 TABLET, EXTENDED RELEASE ORAL at 18:09

## 2024-09-25 RX ADMIN — INSULIN GLARGINE 11 UNITS: 100 INJECTION, SOLUTION SUBCUTANEOUS at 08:44

## 2024-09-25 RX ADMIN — AMITRIPTYLINE HYDROCHLORIDE 10 MG: 10 TABLET ORAL at 20:40

## 2024-09-25 RX ADMIN — Medication: at 20:40

## 2024-09-25 RX ADMIN — FAMOTIDINE 20 MG: 20 TABLET ORAL at 20:40

## 2024-09-25 RX ADMIN — METFORMIN HYDROCHLORIDE 500 MG: 500 TABLET, EXTENDED RELEASE ORAL at 08:28

## 2024-09-25 RX ADMIN — ACETAMINOPHEN 975 MG: 325 TABLET ORAL at 20:40

## 2024-09-25 RX ADMIN — Medication 25 MCG: at 08:28

## 2024-09-25 RX ADMIN — HEPARIN SODIUM 5000 UNITS: 5000 INJECTION, SOLUTION INTRAVENOUS; SUBCUTANEOUS at 08:29

## 2024-09-25 ASSESSMENT — ACTIVITIES OF DAILY LIVING (ADL)
ADLS_ACUITY_SCORE: 30
BADLS,_PREVIOUS_FUNCTIONAL_LEVEL: INDEPENDENT
ADLS_ACUITY_SCORE: 30
PREVIOUS_RESPONSIBILITIES: MEAL PREP;HOUSEKEEPING;LAUNDRY;SHOPPING;MEDICATION MANAGEMENT;FINANCES;DRIVING
ADLS_ACUITY_SCORE: 30
IADLS,_PREVIOUS_FUNCTIONAL_LEVEL: INDEPENDENT

## 2024-09-25 NOTE — PROGRESS NOTES
09/25/24 0800   Appointment Info   Signing Clinician's Name / Credentials (OT) Ivonne Clement, OTR/L   Living Environment   People in Home spouse   Current Living Arrangements house   Home Accessibility stairs within home   Number of Stairs, Within Home, Primary greater than 10 stairs   Stair Railings, Within Home, Primary railing on right side (ascending)   Transportation Anticipated family or friend will provide;health plan transportation   Living Environment Comments Pt lives in a home with his wife. Enter through garage to lower level (family room, bathroom, laundry). 14 stairs up to main living level with bedroom, kitchen and bathroom. Pt has a tub/shower with grab bars. One grab bar by toilet.Standard height toilets. Pt two sons live nearby and are also able to help.   Self-Care   Usual Activity Tolerance good   Current Activity Tolerance fair   Regular Exercise Yes   Activity/Exercise Type walking  (2-3 miles a day)   Exercise Amount/Frequency daily   Equipment Currently Used at Home cane, straight;grab bar, tub/shower   Fall history within last six months yes   Number of times patient has fallen within last six months 2   Instrumental Activities of Daily Living (IADL)   Previous Responsibilities meal prep;housekeeping;laundry;shopping;medication management;finances;driving   IADL Comments Pt was modified independent with all ADLs/IADLs, transfers, mobility and gait.  He used a cane for ambulation.   General Information   Onset of Illness/Injury or Date of Surgery 09/05/24   Referring Physician Haim Ruby DO   Patient/Family Therapy Goal Statement (OT) Return home   Existing Precautions/Restrictions fall;spinal;brace worn when out of bed   Limitations/Impairments safety/cognitive   Left Upper Extremity (Weight-bearing Status) partial weight-bearing (PWB)  (10#)   Right Upper Extremity (Weight-bearing Status) partial weight-bearing (PWB)  (10#)   Cognitive Status Examination   Orientation Status  "person;place   Cognitive Status Comments Pt reports increases in cognitive concerns as well as delusions (seeing things that are not actually there). i.e being on a train but actually in hospital.   Visual Perception   Visual Impairment/Limitations corrective lenses for distance   Posture   Posture forward head position;kyphosis   Range of Motion Comprehensive   General Range of Motion bilateral upper extremity ROM WFL   Strength Comprehensive (MMT)   General Manual Muscle Testing (MMT) Assessment hand strength deficits identified   Comment, General Manual Muscle Testing (MMT) Assessment General deconditioning post op   Hand Strength   Right hand  (pounds) 40   Left hand  (pounds) 35   Coordination   Coordination Comments Pt had a stroke in 2010 that impacted his L side   Clinical Impression   Criteria for Skilled Therapeutic Interventions Met (OT) Yes, treatment indicated   OT Diagnosis Below baseline with ADL/IADL indeoendence   OT Problem List-Impairments impacting ADL problems related to;activity tolerance impaired;balance;cognition;mobility;strength;post-surgical precautions   Assessment of Occupational Performance 5 or more Performance Deficits   Identified Performance Deficits LB dressing, bathing, functional mobility, toilet transfers, bed mobility, UB dressing   Planned Therapy Interventions (OT) ADL retraining;IADL retraining;bed mobility training;groups;orthotic fitting/training;strengthening;transfer training;home program guidelines   Clinical Decision Making Complexity (OT) problem focused assessment/low complexity   Risk & Benefits of therapy have been explained evaluation/treatment results reviewed;care plan/treatment goals reviewed;risks/benefits reviewed;current/potential barriers reviewed;participants voiced agreement with care plan;participants included;patient   Clinical Impression Comments Per chart \"Judah Leon is a 84 year old right hand dominant male with a PMH of right cerebral " "hemorrhage, CKD stage 3, DM2 on insulin, HLD, malignant prostate cancer, and HTN who sustained a T8-9 minimally displaced fracture and L1 compression fracture from a fall on 9/5/24 s/p T7-11 posterior fusion on 9/9/24 with a slow recovery due to post-operative pain with no overt neurological damage.  He was admitted to acute inpatient rehab on  9/24/24.\" Pt would benefit from skilled occupational therapy services to improve ind with ADLs/IADLs and safety with functional mobility to return closer to PLOF. ELOS 5 days   OT Total Evaluation Time   OT Eval, Low Complexity Minutes (69147) 15   OT Goals   Therapy Frequency (OT) 6 times/week   OT: Hygiene/Grooming modified independent;within precautions   OT: Upper Body Dressing Modified independent;within precautions   OT: Lower Body Dressing Modified independent;within precautions   OT: Lower Body Bathing Modified independent;with precautions   OT: Bed Mobility Modified independent   OT: Toilet Transfer/Toileting Modified independent   OT: Goal 1 Pt will be mod I for tub/shower transfer with use of grab bar.   Self-Care/Home Management   Self-Care/Home Mgmt/ADL, Compensatory, Meal Prep Minutes (17648) 64   Treatment Detail/Skilled Intervention Pt educated on spinal precautions no excessive bending, lifting twisting. Pt educated on completing log roll for bed mobility supine to sit. Mod verbal cues with this. Pt edu on scooting to edge of bed. Pt ambulated to/from bathroom with FWW. Pt able to stand at sink to complete g/h and oral cares. Pt requiring verbal cues for not \"plopping down\" on the bed and instead gradually lowering. Pt verbalizing agreement and understanding. Pt completed LB dressing sitting EOB with mod A for spinal prec adherence. Pt reported mod A this morning with donning brace earlier. Pt educated on role of OT and acute rehab facility. Pt was given a tour of facility and ambulated through hallway SBA with FWW. Pt returned to room min A  and VCs for bed " mobility sit to supine. Pt left in room with CL present and current needs met.   OT Discharge Planning   OT Plan OT: Ambulate with FWW to ETB using grab bars fro transfer. Complete shower assessment and GGs.   Total Session Time   Timed Code Treatment Minutes 45   Total Session Time (sum of timed and untimed services) 60   Post Acute Settings Only   What unit is patient on? Acute Rehab   OT - Acute Rehab Center Time   Individual Time (minutes) - OT 60   ARC Total Session Time (minutes) - OT 60   ARC Daily Total Session Time   OT ARC Daily Total Session Time 60   ARC Daily Rehab Total Minutes 60   Oral Hygiene   Describe performance set up A standing   Grooming (except oral cares)   Grooming Comment Set up A standing   Upper Body Dressing   Describe performance Mod A for brace   Clothing Utilized Orthotic - TLSO, etc   Lower Body Dressing (Pants/Undergarments)   Describe performance Mod A to thread BLE d/t spinal prec   Lower Body Dressing putting on/taking off footwear   Describe performance min A   Toilet Transfer   Describe performance per clinical judgement min A with grab bar   Sit to Lying   Comment min A for spinal prec   Lying to Sitting on Side of Bed   Comment Min A for spinal prec   Sit to Stand   Comment SBA FWW VCs for scooting to EOB before standing

## 2024-09-25 NOTE — PLAN OF CARE
"Discharge Planner Post-Acute Rehab OT:      Discharge Plan: Home with HHOT     Precautions: fall, Spinal:No bending or twisting back when repositioning, No lifting > 10lbs, TLSO brace when OOB.     Current Status:  ADLs:  Mobility: SBA FWW   Grooming: SBA standing with FWW  Dressing: UB: mod A for TLSO, LB: Mod A to adhere to spinal prec. Feet: Min A   Bathing: TBD  Toileting: TBD  IADLs:  Vision/Cognition: TBD, would benefit from further cog assessment      Assessment: Per chart \"Judah Leon is a 84 year old right hand dominant male with a PMH of right cerebral hemorrhage, CKD stage 3, DM2 on insulin, HLD, malignant prostate cancer, and HTN who sustained a T8-9 minimally displaced fracture and L1 compression fracture from a fall on 9/5/24 s/p T7-11 posterior fusion on 9/9/24 with a slow recovery due to post-operative pain with no overt neurological damage. He was admitted to acute inpatient rehab on 9/24/24.\" Pt would benefit from skilled occupational therapy services to improve ind with ADLs/IADLs and safety with functional mobility to return closer to PLOF. ELOS 5 days.      Other Barriers to Discharge (DME, Family Training, etc):    DME, family training  "

## 2024-09-25 NOTE — PHARMACY-MEDICATION REGIMEN REVIEW
Pharmacy Medication Regimen Review  Judah Leon is a 84 year old male who is currently in the Acute Rehab Unit.    Assessment: All medications have an appropriate indications, durations and no unnecessary use was found    Plan:   Continue current medication regimen as planned    Attending provider will be sent this note for review.  If there are any emergent issues noted above, pharmacist will contact provider directly by phone.      Pharmacy will periodically review the resident's medication regimen for any PRN medications not administered in > 72 hours and discontinue them. The pharmacist will discuss gradual dose reductions of psychopharmacologic medications with interdisciplinary team on a regular basis.    Please contact pharmacy if the above does not answer specific medication questions/concerns.    Background:  A pharmacist has reviewed all medications and pertinent medical history today.  Medications were reviewed for appropriate use and any irregularities found are listed with recommendations.      Current Facility-Administered Medications:     acetaminophen (TYLENOL) tablet 975 mg, 975 mg, Oral, TID, David Strong MD, 975 mg at 09/25/24 0828    amitriptyline (ELAVIL) tablet 10 mg, 10 mg, Oral, QPM, David Strong MD, 10 mg at 09/24/24 2041    artificial tears ophthalmic ointment, , Both Eyes, At Bedtime, David Strong MD, Given at 09/24/24 2050    bisacodyl (DULCOLAX) suppository 10 mg, 10 mg, Rectal, Daily PRN, David Strong MD    calcium carbonate (TUMS) chewable tablet 1,000 mg, 1,000 mg, Oral, TID PRN, David Strong MD    carboxymethylcellulose PF (REFRESH PLUS) 0.5 % ophthalmic solution 2 drop, 2 drop, Both Eyes, Q1H PRN, David Strong MD    glucose gel 15-30 g, 15-30 g, Oral, Q15 Min PRN **OR** dextrose 50 % injection 25-50 mL, 25-50 mL, Intravenous, Q15 Min PRN **OR** glucagon injection 1 mg, 1 mg, Subcutaneous, Q15 Min PRN, David Strong MD    famotidine (PEPCID) tablet 20  mg, 20 mg, Oral, BID, Ana Lilia Kong PA, 20 mg at 09/25/24 0828    gabapentin (NEURONTIN) capsule 400 mg, 400 mg, Oral, TID, David Strong MD, 400 mg at 09/25/24 0828    heparin ANTICOAGULANT injection 5,000 Units, 5,000 Units, Subcutaneous, Q12H, Ana Lilia Kong PA, 5,000 Units at 09/25/24 0829    hydrOXYzine HCl (ATARAX) tablet 10 mg, 10 mg, Oral, TID PRN, David Strong MD    insulin aspart (NovoLOG) injection (RAPID ACTING), 1-10 Units, Subcutaneous, TID AC, David Strong MD, 1 Units at 09/25/24 0843    insulin aspart (NovoLOG) injection (RAPID ACTING), 1-7 Units, Subcutaneous, At Bedtime, David Strong MD    insulin aspart (NovoLOG) injection (RAPID ACTING), , Subcutaneous, TID w/meals, Ana Lilia Kong PA, 2 Units at 09/24/24 1824    insulin glargine (LANTUS PEN) injection 11 Units, 11 Units, Subcutaneous, Q24H, Ana Lilia Kong PA, 11 Units at 09/25/24 0844    melatonin tablet 3 mg, 3 mg, Oral, At Bedtime PRN, David Strong MD    metFORMIN (GLUCOPHAGE XR) 24 hr tablet 500 mg, 500 mg, Oral, BID w/meals, David Strong MD, 500 mg at 09/25/24 0828    methocarbamol (ROBAXIN) tablet 500 mg, 500 mg, Oral, 4x Daily PRN, David Strong MD    multivitamin w/minerals (THERA-VIT-M) tablet 1 tablet, 1 tablet, Oral, Daily, David Strong MD, 1 tablet at 09/25/24 0828    naloxone (NARCAN) injection 0.2 mg, 0.2 mg, Intravenous, Q2 Min PRN **OR** naloxone (NARCAN) injection 0.4 mg, 0.4 mg, Intravenous, Q2 Min PRN **OR** naloxone (NARCAN) injection 0.2 mg, 0.2 mg, Intramuscular, Q2 Min PRN **OR** naloxone (NARCAN) injection 0.4 mg, 0.4 mg, Intramuscular, Q2 Min PRN, Haim Ruby DO    oxyCODONE IR (ROXICODONE) half-tab 2.5 mg, 2.5 mg, Oral, BID PRN, Ana Lilia Kong, PA    Patient is already receiving anticoagulation with heparin, enoxaparin (LOVENOX), warfarin (COUMADIN)  or other anticoagulant medication, , Does not apply, Continuous PRN, Ana Lilia Kong, PA    polyethylene  glycol (MIRALAX) Packet 17 g, 17 g, Oral, BID PRN, Ana Lilia Kong PA    senna-docusate (SENOKOT-S/PERICOLACE) 8.6-50 MG per tablet 2 tablet, 2 tablet, Oral, At Bedtime, Ana Lilia Kong PA    simvastatin (ZOCOR) tablet 40 mg, 40 mg, Oral, QPM, David Strong MD, 40 mg at 09/24/24 2041    Vitamin D3 (CHOLECALCIFEROL) tablet 25 mcg, 25 mcg, Oral, Daily, David Strong MD, 25 mcg at 09/25/24 0828  No current outpatient prescriptions on file.   PMH: CKD, HTN, HLD, DM2, hemorrhagic CVA (1997), prostate cancer, who was admitted to Neurosurgery service on 9/5/24 with unstable T8-9 fracture.   Cielo Da Silva, PharmD   Clinical Pharmacist  Phone *[phone]

## 2024-09-25 NOTE — PLAN OF CARE
"Goal Outcome Evaluation:      Plan of Care Reviewed With: patient    Overall Patient Progress: no changeOverall Patient Progress: no change         VS: /67 (BP Location: Right arm)   Pulse 69   Temp 97.7  F (36.5  C) (Oral)   Resp 18   Ht 1.702 m (5' 7\")   Wt 74.4 kg (164 lb 1.6 oz)   SpO2 98%   BMI 25.70 kg/m       Output/Last BM: Continent of B&B, Last BM 9/25   Activity: Ax1 w/ walker and gait belt    Skin/Dressing: Spinal incision open to air, skin tear R upper back     Pain: Denies    CMS: A&Ox4 intermittent confusion    Diet: Regular    LDA: N/a    Equipment: Personal belongings    Plan: Continue POC    Additional Info: Blood sugar checks, TLSO brace when out of bed   Was having confusion upon waking up this AM reoriented pt and notified provider        "

## 2024-09-25 NOTE — PROGRESS NOTES
"CLINICAL NUTRITION SERVICES - ASSESSMENT NOTE     Nutrition Prescription    RECOMMENDATIONS FOR MDs/PROVIDERS TO ORDER:  None at this time    Malnutrition Status:    Patient does not meet two of the established criteria necessary for diagnosing malnutrition    Recommendations already ordered by Registered Dietitian (RD):  - Nutrition education: consistent carbohydrate diet  - Ensure Max with breakfast    Future/Additional Recommendations:  - Monitor PO intake, supplement use, labs, and weight trends  - Monitor need for additional nutrition education prior to discharge     REASON FOR ASSESSMENT  Judah Leon is a/an 84 year old male assessed by the dietitian for Provider Order - Nutrition Education - diabetes diet education, calorie/protein     PMH  PMH of right cerebral hemorrhage, CKD stage 3, DM2 on insulin, HLD, malignant prostate cancer, and HTN who sustained a T8-9 minimally displaced fracture and L1 compression fracture from a fall on 24 s/p T7-11 posterior fusion on 24 with a slow recovery due to post-operative pain with no overt neurological damage.  He was admitted to acute inpatient rehab on  24.    NUTRITION HISTORY  Met with pt and family in room. Pt reports good appetite and has been eating well during hospital admission. He was eating a lot of salads PTA but also consuming meat and potato type meals. He has had consistent carbohydrate diet education in the past, but it was a while ago.     CURRENT NUTRITION ORDERS  Diet: Regular  - Room service with assist    Intake/Tolerance: 100% per flowsheets    LABS  Labs reviewed  BUN: 37.7 (H)  Cr: 1.49 (H)  M.6 (L, )  Hemoglobin A1C: 8.2 ()  Glucose POCT: 120-174 over 24 hours    MEDICATIONS  Medications reviewed  Pepcid  Glipizide  Novolog, high intensity sliding scale   Magnesium oxide  Metformin  Thera-vit-m  Senna-docusate  Vitamin D3, 25 mcg    ANTHROPOMETRICS  Height: 170.2 cm (5' 7\")  Most Recent Weight: 74.4 kg (164 lb 1.6 oz)  "   IBW: 67.3 kg  BMI: Overweight BMI 25-29.9  Weight History:   Wt Readings from Last 15 Encounters:   09/24/24 74.4 kg (164 lb 1.6 oz)   09/15/24 73.2 kg (161 lb 6 oz)     Care Everywhere:  4/8/24: 78.1 kg (172 lb 1.6 oz)   2/15/24: 78.2 kg (172 lb 6.4 oz)   10/17/23: 80.8 kg (178 lb 1.6 oz)   8/14/23: 78.3 kg (172 lb 9.6 oz)     4.7% wt loss in ~5.5 months    Dosing Weight: 74 kg    ASSESSED NUTRITION NEEDS  Estimated Energy Needs: 3963-5407 kcals/day (25 - 30 kcals/kg)  Justification: Maintenance  Estimated Protein Needs: 74-89+ grams protein/day (1 - 1.2+ grams of pro/kg)  Justification: Increased needs w/ advanced age vs CKD  Estimated Fluid Needs: 1 mL/kcal  Justification: Maintenance    PHYSICAL FINDINGS  See malnutrition section below.  Terrence Manjarrez     MALNUTRITION  % Intake: No decreased intake noted  % Weight Loss: Weight loss does not meet criteria  Subcutaneous Fat Loss: None observed  Muscle Loss: None observed  Fluid Accumulation/Edema: None noted  Malnutrition Diagnosis: Patient does not meet two of the established criteria necessary for diagnosing malnutrition    NUTRITION DIAGNOSIS  Predicted inadequate nutrient intake (kcal/pro) related to potential for variation in intake and menu fatigue.     INTERVENTIONS  Implementation  Nutrition Education: Discussed reason for RD visit and role of RD in care. Discussed importance of adequate intakes. Discussed available supplements to help increased protein intake (family thinks pt not eating enough protein foods). Discussed pt may have foods from home/outside hospital.   1. Provided verbal and written nutrition on diabetes meal planning and importance of consistent carbohydrate intake to optimize glycemic control. Reviewed foods that contain carbohydrate and those that contain very little/no carbohydrate. Discussed portion sizes and gave some examples of food choices that have the same amount of carbohydrate.    2. Handouts provided: Carbohydrate Counting for  People with Diabetes, Plate Method for Diabetes   Collaboration with other providers - discussed with PA  Medical food supplement therapy     Goals  Patient to consume % of nutritionally adequate meal trays TID, or the equivalent with supplements/snacks.     Monitoring/Evaluation  Progress toward goals will be monitored and evaluated per protocol.   Guerline Ashford RD, RADHA  Available via phone and Vocera  Phone: [phone]  Vocera: 5R Acute Rehab Clinical Dietitian  Weekend/Holiday Vocera: Weekend Holiday Clinical Dietitian [Multi Site Groups]

## 2024-09-25 NOTE — PLAN OF CARE
Discharge Planner Post-Acute Rehab PT:     Discharge Plan: Home with OP PT, walker based    Precautions:  Spinal:  TLSO when out of bed  No bending or twisting back when repositioning  No lifting > 10lbs    Current Status:  Bed Mobility: MOD I  Transfer: CGA with WW  Gait: CGA with WW  Stairs: CGA 12 stairs with B rails  Balance: Mild instability - pending outcomes    Outcome Measures:   Siddiqui  TUG  6MWT    Assessment:  Pt presents to PT s/p multi-segment spinal fx and subsequent posterior fusion, resulting in increased pain. D/t pain levels, deconditioning, and spinal precautions, pt requires supervision for all ambulatory, transfer, and ADL tasks. Pt would benefit from multi-discipline therapies to improve movement strategies to reduce pain levels and follow spinal precautions, thereby increasing functinoal capacity and independence. ELOS - 5 days     Other Barriers to Discharge (DME, Family Training, etc):   Family education  Falls assessment

## 2024-09-25 NOTE — PROGRESS NOTES
"   09/25/24 7900   Appointment Info   Signing Clinician's Name / Credentials (SLP) Ron Sung MS, CCC-SLP   General Information   Onset of Illness/Injury or Date of Surgery 09/05/24   Referring Physician David Strong MD   Pertinent History of Current Problem Per H&P: Patient is \"84 year old male with a PMH of right cerebral hemorrhage, CKD stage 3, DM2 on insulin, HLD, malignant prostate cancer, and HTN who was admitted on 9/5/24 for management of unstable T8-9 fracture.     He was stable until he lost his balance and sustained a T8-9 fracture as a result of his fall due to his friend falling on top of him.  He endorses hitting his face when he fell without loss of consciousness.  When admitted, he denied weakness, incontinence, or saddle anesthesia but endorsed sharp midback pain.  CT Thoracic spine found minimally distracted fractures at T8-9 and L1 compression fracture.  He underwent T7-11 posterior fusion with Dr. Salas Guadalupe on 9/9/24.  Post-operative course was notable for hyperglycemia managed by endocrine - metformin was restarted.\" SLP consult received for evaluation and treatment as indicated.   General Observations Per admitting MD's H&P, \"He endorses newly getting confused at night, with some disorienting episodes where he doesn't know where he is.  He is concerned that his confusion might be related to hitting his head when he fell.  He denies new changes in vision or new headache.  He has trouble concentrating when reading.  He has chronic tinnitus that hasn't changed.\"  Full cognitive-linguistic evaluation with SLP recommended at ARU. Pt not followed by acute SLP during hospitalization.   Type of Evaluation   Type of Evaluation Speech, Language, Cognition   Motor Speech   Speech Intelligibility (Motor Speech) WNL;conversational level   Auditory Comprehension   Follows Commands (Auditory Comprehension) 1-step command;2-step commands;WFL   Verbal Expression   Conversational Speech (Verbal " Expression) connected speech;WFL   Cognition   Cognitive Function attention deficit;executive function deficit;memory deficit   Cognitive Status Alert, pleasant, cooperative   Additional cognitive-linguistic evaluation indicated  Completed   Cognitive Status Exam Comments RBANS form A administered and interpreted, please see progress note/below for details.   Orientation Status (Cognition) oriented to;person;place;situation   Affect/Mental Status (Cognition) WFL   Follows Commands (Cognition) follows multi-step commands;75-90% accuracy   Executive Function Deficit (Cognition) problem-solving;information processing  (mild)   Attention Deficit (Cognition) selective attention;alternating attention;divided attention  (mild)   Memory Deficit (Cognition) immediate recall;short-term memory;moderate deficit   General Therapy Interventions   Planned Therapy Interventions Cognitive Treatment   Cognitive treatment Internal memory strategy training;External memory strategy training;Progressive attention training   Clinical Impression   Criteria for Skilled Therapeutic Interventions Met (SLP Eval) Yes, treatment indicated   SLP Diagnosis Mild cognitive impairment   Problem List (SLP) Immediate delayed memory deficits   Activity Limitations Related to Problem List (SLP) Increased supervision/assist with iADLS?   Risks & Benefits of therapy have been explained evaluation/treatment results reviewed;care plan/treatment goals reviewed;participants voiced agreement with care plan;participants included;patient   Clinical Impression Comments SLP: Motor speech, language intact. RBANS form A administered and interpreted. Pt overall score in mild cognitive impairment range for age related norms. Pt with deficits in attention, executive function, immediate and delayed recall. Skilled SLP services indicated to train pt in cognitive strategies to promote independence and safety on the unit and upon return to home/community.   SLP Total  Evaluation Time   Cognitive  Performance Testing Minutes, per hour - includes time for administering test, interp results & prep report (34301) 60   SLP Goals   Therapy Frequency (SLP Eval) 6 times/week   SLP Predicted Duration/Target Date for Goal Attainment 09/30/24   SLP Goals SLP Goal 1;SLP Goal 2;SLP Goal 3   SLP: Goal 1 With use of trained memory strategies, patient will recall daily/functional information with 80% or greater independent accuracy.   SLP: Goal 2 Patient will complete high level reasoning/problem solving tasks with 90% or greater independent accuracy.   SLP: Goal 3 Patient will complete high level attention (alternating, divided) tasks with 90% or greater independent accuracy.   SLP Discharge Planning   SLP Plan SLP: SYDNEY as treatment. Trial reading/scanning task, pt reporting difficulty. Intro memory strategies, practice. Tx moderate to high level attention, problem solving.   SLP - Acute Rehab Center Time   Individual Time (minutes) - SLP 60   Group Time (minutes) - SLP 0   Concurrent Time (minutes) - SLP 0   Co-Treatment Time (minutes) - SLP 0   ARC Total Session Time (minutes) - SLP 60   ARC Daily Total Session Time   SLP ARC Daily Total Session Time 60   ARC Daily Rehab Total Minutes 180       Repeatable Battery for the Assessment of Neuropsychological Status (RBANS) FORM A   Immediate Memory Visuospatial/  Constructional Language Attention Delayed Memory Total Scale   Index Score 78 84 86 64 64 68   Percentile Rank 7 14 18 1 1 2     SLP:  Pt seen for administration of RBANS. Results are based on a mean of 100 and a standard deviation of +/- 15.   Interpretation: Please see clinical impressions above.  Face to Face Administration: 45  Scoring/Interpretation: 15  Total Time: 60

## 2024-09-25 NOTE — PHARMACY-CONSULT NOTE
Pharmacy Delirium Chart Review    Upon chart review, the following medications may contribute to possible patient delirium:  Amitriptyline: may cause dose-dependent CNS depression, including confusion, disorientation, dizziness, drowsiness, cognitive dysfunction; risk factors that may increase this risk include concomitant anticholinergic agents and age > 55 years  old   CNS toxicity (defined primarily as delirium or its prodromal symptoms) may have an insidious onset over 1 to 3 weeks following initiation or dose increase  Adult case reports (several patients) list delirium as a potential side effect; can also cause confused state  Famotidine:   CNS effects have been reported and include agitation, confusion and delirium - appear reversible with discontinuation  May occur due to increased central anticholinergic action   Usually occurs within the first 2-3 weeks of oral famotidine use   Risk factors include age > 60 and renal dysfunction   Infrequent incidence in clinical trials + post-marketing studies  Gabapentin: delirium not specifically reported but several nervous system effects reported including confusion (> 1%), lethargy (1%) and memory impairment (>1%)   Hydroxyzine: cognitive dysfunction has been reported after morning and evening doses - drowsiness and dizziness may also occur but usually dose dependent and resolves after discontinuation or dose decrease   Patient has only used two doses of this prn medications so likely not contributing   Oxycodone: opioids are known to cause neurotoxicity, including delirium and is more common in the elderly and patients with poor renal function   Patients last dose of this was 2.5 mg on 9/22 but patient did receive several doses during hospitalization with his poor renal function so could have possibly contributed to poor renal function     Please consult unit pharmacist with further questions.    HERMINIO HONEYCUTT [facility]  Phone/Pager: ARU Pharmacist ([place])

## 2024-09-25 NOTE — PLAN OF CARE
Goal Outcome Evaluation:      Plan of Care Reviewed With: patient    Overall Patient Progress: no changeOverall Patient Progress: no change    Patient here S/P Thoracic Spinal Fusion, alert but forgetful, can be impulsive  Bed alarm went OFF several times overnight, pt just got up whenever he feels going to bathroom  TLSO brace should wear when OOB as ordered  No complained of pain, headache, chest pain, N&V, no SOB  Continent of Bladder and Bowel, ambulates to the bathroom, voiding well, LBM 9/24/2024  Safety rounding checked completed, 3 side rails UP, bed alarm ON, call light/bedside table in standard position  Plan of care ongoing.

## 2024-09-25 NOTE — PROGRESS NOTES
"   09/25/24 1015   Appointment Info   Signing Clinician's Name / Credentials (PT) Brice Aggarwal, SPT   Student Supervision Direct Patient Contact Provided   Living Environment   People in Home spouse   Current Living Arrangements house   Home Accessibility stairs within home   Number of Stairs, Within Home, Primary greater than 10 stairs   Stair Railings, Within Home, Primary railings on both sides of stairs   Transportation Anticipated family or friend will provide   Living Environment Comments Per OT: \"Pt lives in a home with his wife. Enter through garage to lower level (family room, bathroom, laundry). 14 stairs up to main living level with bedroom, kitchen and bathroom. Pt has a tub/shower with grab bars. One grab bar by toilet.Standard height toilets. Pt two sons live nearby and are also able to help.\"   Self-Care   Usual Activity Tolerance good   Current Activity Tolerance fair   Regular Exercise Yes   Activity/Exercise Type walking   Exercise Amount/Frequency daily   Equipment Currently Used at Home cane, straight;grab bar, tub/shower   Fall history within last six months yes   Number of times patient has fallen within last six months 2   Activity/Exercise/Self-Care Comment Patient reports walking 2-3 miles per day prior to his fall.   Post-Acute Assessment Only   Post-Acute Functional Assessment See below   Previous Level of Function/Home Environm   Bathing, Previous Functional Level independent   Grooming, Previous Functional Level independent   Dressing, Previous Functional Level independent   Eating/Feeding, Previous Functional Level independent   Toileting, Previous Functional Level independent   BADLs, Previous Functional Level independent   IADLs, Previous Functional Level independent   Bed Mobility, Previous Functional Level independent   Transfers, Previous Functional Level independent   Household Ambulation, Previous Functional Level independent   Stairs, Previous Functional Level independent " "  Community Ambulation, Previous Functional Level independent   General Information   Onset of Illness/Injury or Date of Surgery 09/09/24   Referring Physician Leonides Lowe MD   Patient/Family Therapy Goals Statement (PT) \"I want to get stronger and feel better.\"   Pertinent History of Current Problem (include personal factors and/or comorbidities that impact the POC) Per EMR: \"84 year old right hand dominant male with a PMH of right cerebral hemorrhage, CKD stage 3, DM2 on insulin, HLD, malignant prostate cancer, and HTN who sustained a T8-9 minimally displaced fracture and L1 compression fracture from a fall on 9/5/24 s/p T7-11 posterior fusion on 9/9/24 with a slow recovery due to post-operative pain with no overt neurological damage.\"   Existing Precautions/Restrictions spinal;fall   Heart Disease Risk Factors Diabetes;Age;Gender;High blood pressure;Dislipidemia   Cognition   Orientation Status (Cognition) oriented x 4   Follows Commands (Cognition) WNL   Cognitive Status Comments Pt is appropriate to participate in PT, however he needs redirection at times with conversation.   Pain Assessment   Patient Currently in Pain Yes, see Vital Sign flowsheet  (LBP currently a 1-2/10. Pt reports that the pain is a 7-8/10 at worst and 1 or 0 /10 at best. Laying in supine aggravates pain and head of bed raised reduces pain.)   Integumentary/Edema   Integumentary/Edema no deficits were identifed   Integumentary/Edema Comments incision not visualized   Posture    Posture Forward head position;Protracted shoulders;Kyphosis   Range of Motion (ROM)   Range of Motion ROM is WFL   Strength (Manual Muscle Testing)   Strength (Manual Muscle Testing) strength is WNL   Strength Comments Globally 5/5. Did not perform break test d/t spinal precautions.   Balance   Balance no deficits were identified   Balance Comments Will formally test in next session.   Sensory Examination   Sensory Perception other (describe)   Sensory " Perception Comments Patient's proprioception at great toe in BLE is intact. Mild-moderate sensory impairments in BLE with vibration, sharp/dull discrimination, light touch, and protective sensation. Likely present before and related to DMII. Wears reading glasses, but denies other vision changes.   Coordination   Coordination Comments Not formally tested, all observed movements were smooth and appropriate.   Muscle Tone   Muscle Tone Comments Not formally tested, all observed movements were smooth and appropriate.   Clinical Impression   Criteria for Skilled Therapeutic Intervention Yes, treatment indicated   PT Diagnosis (PT) Increased LBP and decreased activity tolerance   Influenced by the following impairments Decreased ability to amb and perform ADLs.   Functional limitations due to impairments Unable to participate in amb, transfers, and stair negotiation without supervision.   Clinical Presentation (PT Evaluation Complexity) stable   Clinical Presentation Rationale Patient's functional capacity is diminished d/t spinal precautions and pain, resulting in deconditioning.   Clinical Decision Making (Complexity) low complexity   Planned Therapy Interventions (PT) balance training;gait training;groups;home exercise program;motor coordination training;neuromuscular re-education;patient/family education;postural re-education;stair training;strengthening;stretching;transfer training;progressive activity/exercise;risk factor education;home program guidelines   Risk & Benefits of therapy have been explained evaluation/treatment results reviewed;care plan/treatment goals reviewed;risks/benefits reviewed;current/potential barriers reviewed;participants voiced agreement with care plan;participants included;patient   Clinical Impression Comments Pt presents to PT s/p multi-segment spinal fx and subsequent posterior fusion, resulting in increased pain. D/t pain levels, deconditioning, and spinal precautions, pt requires  supervision for all ambulatory, transfer, and ADL tasks.  Pt would benefit from multi-discipline therapies to improve movement strategies to reduce pain levels and follow spinal precautions, thereby increasing functinoal capacity and independence. ELOS - 5 days.   PT Total Evaluation Time   PT Eval, Low Complexity Minutes (58362) 45   Physical Therapy Goals   PT Frequency 6x/week   PT Predicted Duration/Target Date for Goal Attainment 09/30/24   PT Goals Bed Mobility;Transfers;Gait;Stairs;Wheelchair Mobility;PT Goal 1;PT Goal 2   PT: Transfers Modified independent;Bed to/from chair;Assistive device   PT: Gait Modified independent;Rolling walker;Greater than 200 feet   PT: Stairs Modified independent;Greater than 10 stairs;Rail on both sides   PT: Goal 1 Car transfer: pt will be MOD I getting into family SUV.   Interventions   Interventions Quick Adds Therapeutic Activity;Therapeutic Procedure;Neuromuscular Re-ed   Therapeutic Activity   Therapeutic Activities: dynamic activities to improve functional performance Minutes (72296) 15   Treatment Detail/Skilled Intervention Focused on assessing GG items. Cues provided for maintaining safety with curb mounting and upright posture with AD held close. SBA-CGA was required for all tasks. See below   PT Discharge Planning   PT Plan BBS, TUG, 6MWT. D/c planning and family training   Total Session Time   Timed Code Treatment Minutes 15   Total Session Time (sum of timed and untimed services) 60   Post Acute Settings Only   What unit is patient on? Acute Rehab   PT - Acute Rehab Center Time   Individual Time (minutes) - PT 60   Group Time (minutes) - PT 0   Concurrent Time (minutes) - PT 0   Co-Treatment Time (minutes) - PT 0   ARC Total Session Time (minutes) - PT 60   ARC Daily Total Session Time   PT ARC Daily Total Session Time 60   ARC Daily Rehab Total Minutes 120   Chair/bed-to-chair Transfer   Complete independence for chair-bed-to-chair transfer no   Describe  performance CGA   Environmental assistance for getting from chair-bed-to-chair Set-up assistance prior to the activity   Adaptive Equipment utilized for chair-bed-to-chair Walker   Roll Left and Right   Assistance Needed Independent   Physical Assistance Level No physical assistance   Comment Performs with barrel roll in TLSO appropriately   Roll Left to Right CARE Score 6   Sit to Lying   Assistance Needed Adaptive equipment   Physical Assistance Level No physical assistance   Comment bed rails   Sit to Lying CARE Score 6   Lying to Sitting on Side of Bed   Assistance Needed Adaptive equipment   Physical Assistance Level No physical assistance   Comment bed rails   Lying to Sitting CARE Score 6   Sit to Stand   Assistance Needed Independent   Physical Assistance Level No physical assistance   Comment BUE support from [facility] chair's arm rests   Sitting to Standing CARE Score 6   Locomotion   Assistive Devices Rolling walker   Functional Performance Assistance provided for locomotion (see comments)   Locomotion Comment CGA and verbal cueing to keep AD close and to stand with upright posture   Walk 10 Feet   Assistance Needed Adaptive equipment;Supervision   Physical Assistance Level Contact guard assist   Comment FWW   Walk 10 Ft. CARE Score 4   Walk 50 Feet with Two Turns   Patient Performance Adaptive equipment;Supervision   Physical Assistance Level Contact guard assist   Comment FWW   Walk 50 Ft. CARE Score 4   Walk 150 Feet   Assistance Needed Adaptive equipment;Supervision   Physical Assistance Level Contact guard assist   Comment FWW   Walk 150 Ft. CARE Score 4   Walking 10 Feet on Uneven Surfaces   Assistance Needed Adaptive equipment;Supervision   Physical Assistance Level Contact guard assist   Comment FWW   Walking 10 Feet on Uneven Surfaces CARE Score 4   Wheel 50 Feet with Two Turns   Reason if not Attempted Activity not applicable   Wheel 50 Feet with Two Turns CARE Score 9   Wheel 150 Feet   Reason if not  Attempted Activity not applicable   Wheel 150 Feet CARE Score 9   Stairs   Assistive Devices 2 rails   Functional Performance Assistance provided for stairs (see comments)   Stairs Comment SBA-CGA   1 Step (Curb)   Assistance Needed Adaptive equipment;Supervision;Verbal cues   Physical Assistance Level Contact guard assist   Comment FWW, required verbal cueing for AD placement, as well as to get close to edge of curb before descending   1 Step CARE Score 4   4 Steps   Assistance Needed Adaptive equipment;Supervision   Physical Assistance Level Contact guard assist   Comment BUE support on railings   4 Steps CARE Score 4   12 Steps   Assistance Needed Adaptive equipment;Supervision   Physical Assistance Level Contact guard assist   Comment BUE support on railings   12 Steps CARE Score 4   Picking Up Object   Comment Not attempted d/t spinal precautions   Reason if not Attempted Medical concerns    CARE Score 88   Car Transfer   Assistance Needed Adaptive equipment;Supervision   Physical Assistance Level Contact guard assist   Car Transfer CARE Score 4

## 2024-09-25 NOTE — PROGRESS NOTES
[facility]   Acute Rehabilitation Unit  Daily progress note    INTERVAL HISTORY  Judah Leon was seen and examined at bedside.  Son, Eric, and wife, Yolette, present for duration of visit.  Judah says this am went well happy with therapy.  Says he was confused about situation and room, had some issues with understanding nursing staff due to accents.  Denies dizziness, sob, fever, appetite ok, denies n/v, no significant back pain, no new numbness/ tingling in legs.      We discussed goals for home, goals for eating, diabetes regimen.  Judah checked glucose daily at home, used oral agents only.   -discontinue lantus and carb coverage- start glipizide 2.5 mg daily  -nutrition education- on diabetes, current protein/calorie needs   -continue to check glucose qid and monitor    Functionally, undergoing therapy evals- anticipating discharge 9/30/24        MEDICATIONS  Current Facility-Administered Medications   Medication Dose Route Frequency Provider Last Rate Last Admin    acetaminophen (TYLENOL) tablet 975 mg  975 mg Oral TID David Strong MD   975 mg at 09/24/24 2041    amitriptyline (ELAVIL) tablet 10 mg  10 mg Oral QPM David Strong MD   10 mg at 09/24/24 2041    artificial tears ophthalmic ointment   Both Eyes At Bedtime David Strong MD   Given at 09/24/24 2050    famotidine (PEPCID) tablet 20 mg  20 mg Oral BID Ana Lilia Kong PA   20 mg at 09/24/24 2041    gabapentin (NEURONTIN) capsule 400 mg  400 mg Oral TID David Strong MD   400 mg at 09/24/24 2041    heparin ANTICOAGULANT injection 5,000 Units  5,000 Units Subcutaneous Q12H Ana Lilia Kong PA   5,000 Units at 09/24/24 2042    insulin aspart (NovoLOG) injection (RAPID ACTING)  1-10 Units Subcutaneous TID AC David Strong MD        insulin aspart (NovoLOG) injection (RAPID ACTING)  1-7 Units Subcutaneous At Bedtime David Strong MD        insulin aspart (NovoLOG) injection (RAPID ACTING)    Subcutaneous TID w/meals Ana Lilia Kong PA   2 Units at 09/24/24 1824    insulin glargine (LANTUS PEN) injection 11 Units  11 Units Subcutaneous Q24H Ana Lilia Kong PA        metFORMIN (GLUCOPHAGE XR) 24 hr tablet 500 mg  500 mg Oral BID w/meals David Strong MD   500 mg at 09/24/24 1842    multivitamin w/minerals (THERA-VIT-M) tablet 1 tablet  1 tablet Oral Daily David Strong MD        senna-docusate (SENOKOT-S/PERICOLACE) 8.6-50 MG per tablet 2 tablet  2 tablet Oral At Bedtime Ana Lilia Kong PA        simvastatin (ZOCOR) tablet 40 mg  40 mg Oral QPM David Strong MD   40 mg at 09/24/24 2041    Vitamin D3 (CHOLECALCIFEROL) tablet 25 mcg  25 mcg Oral Daily David Strong MD   25 mcg at 09/24/24 1510          Current Facility-Administered Medications   Medication Dose Route Frequency Provider Last Rate Last Admin    bisacodyl (DULCOLAX) suppository 10 mg  10 mg Rectal Daily PRN David Strong MD        calcium carbonate (TUMS) chewable tablet 1,000 mg  1,000 mg Oral TID PRN David Strong MD        carboxymethylcellulose PF (REFRESH PLUS) 0.5 % ophthalmic solution 2 drop  2 drop Both Eyes Q1H PRN David Strong MD        glucose gel 15-30 g  15-30 g Oral Q15 Min PRN David Strong MD        Or    dextrose 50 % injection 25-50 mL  25-50 mL Intravenous Q15 Min PRN David Strong MD        Or    glucagon injection 1 mg  1 mg Subcutaneous Q15 Min PRN David Strong MD        hydrOXYzine HCl (ATARAX) tablet 10 mg  10 mg Oral TID PRN David Strong MD        melatonin tablet 3 mg  3 mg Oral At Bedtime PRN David Strong MD        methocarbamol (ROBAXIN) tablet 500 mg  500 mg Oral 4x Daily PRN David Strong MD        naloxone (NARCAN) injection 0.2 mg  0.2 mg Intravenous Q2 Min PRN Haim Ruby DO        Or    naloxone (NARCAN) injection 0.4 mg  0.4 mg Intravenous Q2 Min PRN Haim Ruby,         Or    naloxone (NARCAN) injection 0.2 mg  0.2 mg Intramuscular Q2  "Min PRN Haim Ruby DO        Or    naloxone (NARCAN) injection 0.4 mg  0.4 mg Intramuscular Q2 Min PRN Haim Ruby DO        oxyCODONE IR (ROXICODONE) half-tab 2.5 mg  2.5 mg Oral BID PRN Ana Lilia Kong PA        Patient is already receiving anticoagulation with heparin, enoxaparin (LOVENOX), warfarin (COUMADIN)  or other anticoagulant medication   Does not apply Continuous PRN Ana Lilia Kong PA        polyethylene glycol (MIRALAX) Packet 17 g  17 g Oral BID PRN Ana Lilia Kong PA            PHYSICAL EXAM  /67 (BP Location: Right arm)   Pulse 69   Temp 97.7  F (36.5  C) (Oral)   Resp 18   Ht 1.702 m (5' 7\")   Wt 74.4 kg (164 lb 1.6 oz)   SpO2 98%   BMI 25.70 kg/m    Gen: awake alert  HEENT: mmm  Pulm: non labored  Abd: non distended wearing brace  Ext: without edema  Neuro/MSK: awake alert     LABS  CBC RESULTS:   Recent Labs   Lab Test 09/24/24  0601 09/23/24  0428 09/22/24  0550   WBC 5.6 7.0 5.6   RBC 3.08* 3.12* 3.36*   HGB 10.2* 10.2* 10.9*   HCT 30.2* 30.3* 32.8*   MCV 98 97 98   MCH 33.1* 32.7 32.4   MCHC 33.8 33.7 33.2   RDW 13.1 12.9 13.0    177 192     Last Basic Metabolic Panel:  Recent Labs   Lab Test 09/25/24  0159 09/24/24  2106 09/24/24  1744 09/24/24  0736 09/24/24  0601 09/23/24  0838 09/23/24  0428 09/22/24  0754 09/22/24  0550   NA  --   --   --   --  137  --  137  --  137   POTASSIUM  --   --   --   --  4.2  --  4.7  --  4.4   CHLORIDE  --   --   --   --  105  --  103  --  104   CO2  --   --   --   --  22  --  24  --  23   ANIONGAP  --   --   --   --  10  --  10  --  10   * 174* 120*   < > 183*   < > 245*   < > 178*   BUN  --   --   --   --  37.7*  --  39.5*  --  32.9*   CR  --   --   --   --  1.49*  --  1.57*  --  1.68*   GFRESTIMATED  --   --   --   --  45*  --  43*  --  39*   ROB  --   --   --   --  9.7  --  9.5  --  9.7    < > = values in this interval not displayed.       Rehabilitation   Impairment group code: 08.9 unstable T8-9 " fractures     PT, OT and SLP 60 minutes of each on a daily basis, in addition to rehab nursing and close management of physiatrist.    Impairment of ADL's: OT for 60 min daily to work on upper and lower body self care, dressing, toileting, bathing, energy conservation techniques with use of ADs as needed.   Impairment of mobility:  PT for 60 min daily to work on gait exercises, strengthening, endurance buildup, transfers with use of walker as needed.   Impairment of cognition/language/swallow:  SLP for 60 min daily for cognitive evaluation and treatment strategies for higher level cognitive deficits and memory impairment.   Rehab RN to administer medication, patient education on medication taking, VS monitoring, bowel regimen, glucose monitoring and wound care/surgical wound dressing changes and monitoring.   Adjustment to disability:  Clinical psychology to eval and treat if needed      - continue comprehensive acute inpatient rehabilitation program with multidisciplinary approach including therapies, rehab nursing, and physiatry following. See interval history for updates.      ASSESSMENT AND PLAN    Judah Leon is a 84 year old right hand dominant male with a PMH of right cerebral hemorrhage, CKD stage 3, DM2 on insulin, HLD, malignant prostate cancer, and HTN who sustained a T8-9 minimally displaced fracture and L1 compression fracture from a fall on 9/5/24 s/p T7-11 posterior fusion on 9/9/24 with a slow recovery due to post-operative pain with no overt neurological damage.  He was admitted to acute inpatient rehab on  9/24/24.    #Postoperative back Pain limiting mobility and ADLs  #Impaired cognition  - acetaminophen 975mg TID  - reduce gabapentin 300 mg tid  - hydroxyzine 10mg  - ocyxodone 2.5mg BID PRN, look to wean as this may be affecting cognition and confusion  - methocarbamol 500mg BID PRN spasms, consider discontinuing  - PT, OT, and SLP  - Clinical Psychology     #Bowel  - plyethylene glycol  17grams BID PRN  - senna/docusate 2 tabs at bedtime     #Bladder  - monitor on admit     #Depression  - amitriptyline 10mg every evening     #Sleep  - melatonin 3mg bedtime PRN     Neuro  #Head injury during fall  With 18 score on SLUMS and endorsement of confusion along with remote history of right head bleed, right SDH is on the differential.  However, no focal neurological signs.  - low threshold for head imaging if altered mental status or new focal neurological signs     Ortho  #S/P Posterior thoracic fusion  T7-11 fusion performed 9/9/24.  Staples removed.  - leave wound open to air said per surgery, ok to cover and change dressing daily prn  No showering or submerging.    - precautions, thoracic lumbar spine.  TLSO when out of bed.  No bending or twisting back when repositioning  - precautions: no bending, twisting, or lifting > 10lbs.    -follow up neurosurgery     CV  #HLD  - simvastatin 40mg every evening     Endo  #DM2  History of hyperglycemia during hospitalization.  Last A1c 8.2%.  - hypoglycemia protocol  - discontinue carb counting & lantus  -consult dietician for diet education-   -start glipizide 2.5 mg daily  - FSG QID  - insulin SSI high  - metformin 500mg BID with meals     FEN/GI  #Diet  - regular with thin liquids  - calcium carbonate 1000mg TID PRN  - encourage hydration  - multivitamin daily     #Vitamin D deficiency  - vitamin D3 1000 units daily     Ppx  #GI: famotidine 20mg BID  #DVT: heparin 500 units Q12H     Other  #Dry eyes: artificial tears, carboxymethylcellulose PF 0.5% 2 drop both eyes q1h prn  #CKD: Monitor with BMP, encourage hydration        Adjustment to disability:  monitor mood  FEN: reg  Bowel: monitor  Bladder: monitor  DVT Prophylaxis: subcutaneous heparin   GI Prophylaxis: pepcid  Code: full   Disposition: goal for home   ELOS: 7 days  Follow up Appointments on Discharge:  Pcp, neurosurgery, nephro    40 minutes spent on the date of the encounter doing (chart  review/review of outside records/review of test results/interpretation of tests/patient visit/documentation/discussion with other provider(s)/discussion with family    Ana Lilia HEARNC  Physical Medicine & Rehabilitation

## 2024-09-25 NOTE — PLAN OF CARE
Goal Outcome Evaluation:      Plan of Care Reviewed With: patient    Overall Patient Progress: no changeOverall Patient Progress: no change    Outcome Evaluation: Patient is alert and oriented. Can make needs known. 1 assist with gait belt and walker. Regular diet, thin liquids. Takes pills whole with water.Continent of bowel and bladder, toileting schedule. Last bm 9/24.Bed and chair alarm on at all times. Call light within reach. Will continue with POC.

## 2024-09-25 NOTE — PLAN OF CARE
Discharge Planner Post-Acute Rehab SLP:     Discharge Plan: home with wife and increased assist from family. Ongoing SLP TBD    Precautions: TLSO/spine    Current Status:  Hearing: WFL, tinnitus.  Vision: Distance glasses  Communication: Motor speech, language intact  Cognition: Mild cognitive impairment; attention, executive function, immediate and delayed recall  Swallow: Regular solids/thin liquids (0). Not evaluated by ARU SLP.    Assessment: Motor speech, language intact. RBANS form A administered and interpreted. Pt overall score in mild cognitive impairment range for age related norms. Pt with deficits in attention, executive function, immediate and delayed recall. Skilled SLP services indicated to train pt in cognitive strategies to promote independence and safety on the unit and upon return to home/community.     Other Barriers to Discharge (Family Training, etc): level of supervision/assist for iADLS?

## 2024-09-26 ENCOUNTER — APPOINTMENT (OUTPATIENT)
Dept: PHYSICAL THERAPY | Facility: CLINIC | Age: 84
DRG: 561 | End: 2024-09-26
Attending: STUDENT IN AN ORGANIZED HEALTH CARE EDUCATION/TRAINING PROGRAM
Payer: COMMERCIAL

## 2024-09-26 ENCOUNTER — APPOINTMENT (OUTPATIENT)
Dept: SPEECH THERAPY | Facility: CLINIC | Age: 84
DRG: 561 | End: 2024-09-26
Attending: STUDENT IN AN ORGANIZED HEALTH CARE EDUCATION/TRAINING PROGRAM
Payer: COMMERCIAL

## 2024-09-26 ENCOUNTER — APPOINTMENT (OUTPATIENT)
Dept: OCCUPATIONAL THERAPY | Facility: CLINIC | Age: 84
DRG: 561 | End: 2024-09-26
Attending: STUDENT IN AN ORGANIZED HEALTH CARE EDUCATION/TRAINING PROGRAM
Payer: COMMERCIAL

## 2024-09-26 LAB
ANION GAP SERPL CALCULATED.3IONS-SCNC: 11 MMOL/L (ref 7–15)
BASOPHILS # BLD AUTO: 0 10E3/UL (ref 0–0.2)
BASOPHILS NFR BLD AUTO: 1 %
BUN SERPL-MCNC: 36.4 MG/DL (ref 8–23)
CALCIUM SERPL-MCNC: 9.7 MG/DL (ref 8.8–10.4)
CHLORIDE SERPL-SCNC: 106 MMOL/L (ref 98–107)
CREAT SERPL-MCNC: 1.63 MG/DL (ref 0.67–1.17)
EGFRCR SERPLBLD CKD-EPI 2021: 41 ML/MIN/1.73M2
EOSINOPHIL # BLD AUTO: 0 10E3/UL (ref 0–0.7)
EOSINOPHIL NFR BLD AUTO: 1 %
ERYTHROCYTE [DISTWIDTH] IN BLOOD BY AUTOMATED COUNT: 12.9 % (ref 10–15)
GLUCOSE BLDC GLUCOMTR-MCNC: 108 MG/DL (ref 70–99)
GLUCOSE BLDC GLUCOMTR-MCNC: 119 MG/DL (ref 70–99)
GLUCOSE BLDC GLUCOMTR-MCNC: 152 MG/DL (ref 70–99)
GLUCOSE BLDC GLUCOMTR-MCNC: 155 MG/DL (ref 70–99)
GLUCOSE BLDC GLUCOMTR-MCNC: 177 MG/DL (ref 70–99)
GLUCOSE SERPL-MCNC: 158 MG/DL (ref 70–99)
HCO3 SERPL-SCNC: 22 MMOL/L (ref 22–29)
HCT VFR BLD AUTO: 31 % (ref 40–53)
HGB BLD-MCNC: 10.4 G/DL (ref 13.3–17.7)
IMM GRANULOCYTES # BLD: 0 10E3/UL
IMM GRANULOCYTES NFR BLD: 0 %
LYMPHOCYTES # BLD AUTO: 1.5 10E3/UL (ref 0.8–5.3)
LYMPHOCYTES NFR BLD AUTO: 31 %
MCH RBC QN AUTO: 32.6 PG (ref 26.5–33)
MCHC RBC AUTO-ENTMCNC: 33.5 G/DL (ref 31.5–36.5)
MCV RBC AUTO: 97 FL (ref 78–100)
MONOCYTES # BLD AUTO: 0.5 10E3/UL (ref 0–1.3)
MONOCYTES NFR BLD AUTO: 10 %
NEUTROPHILS # BLD AUTO: 2.8 10E3/UL (ref 1.6–8.3)
NEUTROPHILS NFR BLD AUTO: 58 %
NRBC # BLD AUTO: 0 10E3/UL
NRBC BLD AUTO-RTO: 0 /100
PLATELET # BLD AUTO: 186 10E3/UL (ref 150–450)
POTASSIUM SERPL-SCNC: 4.3 MMOL/L (ref 3.4–5.3)
RBC # BLD AUTO: 3.19 10E6/UL (ref 4.4–5.9)
SODIUM SERPL-SCNC: 139 MMOL/L (ref 135–145)
WBC # BLD AUTO: 4.8 10E3/UL (ref 4–11)

## 2024-09-26 PROCEDURE — 99232 SBSQ HOSP IP/OBS MODERATE 35: CPT | Mod: FS | Performed by: PHYSICIAN ASSISTANT

## 2024-09-26 PROCEDURE — 97750 PHYSICAL PERFORMANCE TEST: CPT | Mod: GP

## 2024-09-26 PROCEDURE — 97130 THER IVNTJ EA ADDL 15 MIN: CPT | Mod: GN | Performed by: SPEECH-LANGUAGE PATHOLOGIST

## 2024-09-26 PROCEDURE — 36415 COLL VENOUS BLD VENIPUNCTURE: CPT

## 2024-09-26 PROCEDURE — 97129 THER IVNTJ 1ST 15 MIN: CPT | Mod: GN | Performed by: SPEECH-LANGUAGE PATHOLOGIST

## 2024-09-26 PROCEDURE — 97535 SELF CARE MNGMENT TRAINING: CPT | Mod: GO | Performed by: STUDENT IN AN ORGANIZED HEALTH CARE EDUCATION/TRAINING PROGRAM

## 2024-09-26 PROCEDURE — 250N000013 HC RX MED GY IP 250 OP 250 PS 637: Performed by: PHYSICIAN ASSISTANT

## 2024-09-26 PROCEDURE — 97130 THER IVNTJ EA ADDL 15 MIN: CPT | Mod: GN | Performed by: REHABILITATION PRACTITIONER

## 2024-09-26 PROCEDURE — 80048 BASIC METABOLIC PNL TOTAL CA: CPT

## 2024-09-26 PROCEDURE — 250N000013 HC RX MED GY IP 250 OP 250 PS 637

## 2024-09-26 PROCEDURE — 85004 AUTOMATED DIFF WBC COUNT: CPT

## 2024-09-26 PROCEDURE — 128N000003 HC R&B REHAB

## 2024-09-26 PROCEDURE — 97129 THER IVNTJ 1ST 15 MIN: CPT | Mod: GN | Performed by: REHABILITATION PRACTITIONER

## 2024-09-26 PROCEDURE — 250N000011 HC RX IP 250 OP 636: Performed by: PHYSICIAN ASSISTANT

## 2024-09-26 RX ADMIN — SIMVASTATIN 40 MG: 20 TABLET, FILM COATED ORAL at 20:57

## 2024-09-26 RX ADMIN — METFORMIN HYDROCHLORIDE 500 MG: 500 TABLET, EXTENDED RELEASE ORAL at 08:31

## 2024-09-26 RX ADMIN — GABAPENTIN 300 MG: 300 CAPSULE ORAL at 16:37

## 2024-09-26 RX ADMIN — GABAPENTIN 300 MG: 300 CAPSULE ORAL at 20:56

## 2024-09-26 RX ADMIN — ACETAMINOPHEN 975 MG: 325 TABLET ORAL at 08:27

## 2024-09-26 RX ADMIN — FAMOTIDINE 20 MG: 20 TABLET ORAL at 20:57

## 2024-09-26 RX ADMIN — MAGNESIUM OXIDE TAB 400 MG (241.3 MG ELEMENTAL MG) 400 MG: 400 (241.3 MG) TAB at 08:29

## 2024-09-26 RX ADMIN — Medication: at 21:05

## 2024-09-26 RX ADMIN — Medication 25 MCG: at 08:31

## 2024-09-26 RX ADMIN — FAMOTIDINE 20 MG: 20 TABLET ORAL at 08:32

## 2024-09-26 RX ADMIN — ACETAMINOPHEN 975 MG: 325 TABLET ORAL at 16:37

## 2024-09-26 RX ADMIN — Medication 1 TABLET: at 08:30

## 2024-09-26 RX ADMIN — AMITRIPTYLINE HYDROCHLORIDE 10 MG: 10 TABLET ORAL at 20:57

## 2024-09-26 RX ADMIN — HEPARIN SODIUM 5000 UNITS: 5000 INJECTION, SOLUTION INTRAVENOUS; SUBCUTANEOUS at 20:57

## 2024-09-26 RX ADMIN — ACETAMINOPHEN 975 MG: 325 TABLET ORAL at 20:57

## 2024-09-26 RX ADMIN — GABAPENTIN 300 MG: 300 CAPSULE ORAL at 08:38

## 2024-09-26 RX ADMIN — Medication 2.5 MG: at 08:31

## 2024-09-26 RX ADMIN — HEPARIN SODIUM 5000 UNITS: 5000 INJECTION, SOLUTION INTRAVENOUS; SUBCUTANEOUS at 08:42

## 2024-09-26 RX ADMIN — SENNOSIDES AND DOCUSATE SODIUM 2 TABLET: 50; 8.6 TABLET ORAL at 20:57

## 2024-09-26 ASSESSMENT — ACTIVITIES OF DAILY LIVING (ADL)
ADLS_ACUITY_SCORE: 33
ADLS_ACUITY_SCORE: 30
ADLS_ACUITY_SCORE: 30
ADLS_ACUITY_SCORE: 33
ADLS_ACUITY_SCORE: 33
ADLS_ACUITY_SCORE: 30
ADLS_ACUITY_SCORE: 33
ADLS_ACUITY_SCORE: 30
ADLS_ACUITY_SCORE: 33
ADLS_ACUITY_SCORE: 31
ADLS_ACUITY_SCORE: 30
ADLS_ACUITY_SCORE: 33
ADLS_ACUITY_SCORE: 31
ADLS_ACUITY_SCORE: 30

## 2024-09-26 NOTE — DISCHARGE INSTRUCTIONS
Follow up appointments    PCP  You are scheduled to see Dr. Obregon on 10/3/2024 at 2:14 pm.    Address [address] [place] [address]   Phone  [phone]    Neurosurgery  You are scheduled to see Ivonne Mccullough NP on October 3 2024 at 6:45 am.    Address          [address] [facility] [address]  Phone  [phone]    Nephrology  You are scheduled to see Mary Chowdhury on 10/11/2024 at 9:00 am.    Address [address] [place] [id number]  Phone  [phone]

## 2024-09-26 NOTE — PLAN OF CARE
Goal Outcome Evaluation:      Plan of Care Reviewed With: patient    Overall Patient Progress: no changeOverall Patient Progress: no change         Orientation: Alert/ disoriented to time, place, and year during morning assessment, but improved through out the day and became orient to time, place, and year.   Bowel: Continent, LBM 9/26  Bladder: Continent, uses urinal at bedside or toilet in bathroom.   Pain: Patient rates back pain with movement 5/10 managed by scheduled tylenol and gabapentin.   Ambulation/Transfers: A1 walker and gait belt.   Blood sugars: QID BG checks. Patient ate prior to AM BG check, Noon was 108, Dinner 119.    Diet/ Liquids: Regular diet, thin liquids, medications whole.   Oxygen: on Room Air  Skin: Surgical incision PEYTON.      TLSO brace to be worn when OOB.     Bed alarm on for safety, call light within reach. Continue with POC.

## 2024-09-26 NOTE — PLAN OF CARE
Discharge Planner Post-Acute Rehab SLP:     Discharge Plan: home with wife and increased assist from family. No ongoing SLP anticipated. Recommend outpatient neuropsych to further evaluate cognitive decline over recent years.    Precautions: TLSO/spine    Current Status:  Hearing: WFL, tinnitus.  Vision: Distance glasses  Communication: Motor speech, language intact  Cognition: Mild cognitive impairment; attention, executive function, immediate and delayed recall  Swallow: Regular solids/thin liquids (0). Not evaluated by ARU SLP.    Assessment: Facilitated visual attention/scanning task with reading, easy, moderate, and higher complexity reading stim utilized. Pt with no difficulty with visually basic, no alternating visual attention component. Pt required increased cues for accuracy, locating targets when alternating visual attention component, smaller text, more visual complex/less white space. Pt benefitted fom crossing off completed info, utilizing blank page to block lower portion of page. Pt edu in rationale for use of visual attention strategies.     Other Barriers to Discharge (Family Training, etc): level of supervision/assist for iADLS?

## 2024-09-26 NOTE — PLAN OF CARE
Discharge Planner Post-Acute Rehab OT:      Discharge Plan: Home with HHOT     Precautions: fall, Spinal:No bending or twisting back when repositioning, No lifting > 10lbs, TLSO brace when OOB.     Current Status:  ADLs:  Mobility: SBA FWW   Grooming: SBA standing with FWW  Dressing: UB: set up for shirt, mod A for TLSO, LB: min A to adhere to spinal prec. Feet: Min A   Bathing: sponge bath due to orders saying no showering at this time  Toileting: CGA for transfer with transfer, CGA for poonam cares, clothing  IADLs:  Vision/Cognition: TBD, would benefit from further cog assessment      Assessment: Pt cognition impaired and limiting at times. Cues for spinal precautions. Per orders shower is  not allowed at this time. Ed on sponge bath strategies. Cognition may be limiting for advancement to mod I.      Other Barriers to Discharge (DME, Family Training, etc):    DME, family training

## 2024-09-26 NOTE — CONSULTS
Social Work: Initial Assessment with Discharge Plan    Patient Name: Judah Leon  : 1940  Age: 84 year old  MRN: [id number]  Completed assessment with: Chart review and interview with patient   Admitted to ARU: 2024    Presenting Information   Date of SW assessment: 2024  Health Care Directive: Provided education  Primary Health Care Agent: Patient/self   Secondary Health Care Agent: NOK, spouse  Living Situation: Lives with spouse in a split-level home, 7 stairs up/down with B railings, main living upstairs.   Previous Functional Status: IND with mobility and I/ADLs at baseline. Owns a cane. Patient's wife was completing the housekeeping and laundry tasks, pt and wife both participated in completion of meal preparation tasks. Pt was completing the shopping tasks. Driving PTA, but wife is also able to drive (pt shared she won't drive in traffic though).  DME available: See therapy evaluation for more information   Patient and family understanding of hospitalization: Appropriate and pleasant  Cultural/Language/Spiritual Considerations: 84 year old  male, English speaking, .    Physical Health  Reason for admission: Judah Leon is a 84 year old-year-old male who was in his usual state of health and was with his friend when he saw his friend start to lose his balance, so he ran to help. Unfortunately, his friend ended up landing on top of Mr. Leon and fractured his spine at T8-9. He denied loss of consciousness. He denies significant weakness, only endorsed sharp pain that begins in his midback and radiates across his abdomen. Neurosurgery was consulted due to the unstable T8-9 fracture and patient was placed in a TLSO. On , pt underwent treatment and reduction of T9 vertebral body fracture, placement of bilateral percutaneous screws at T7 (left), T8, T9, T10 and T11, and T7-T11 posterior arthrodesis with Bone Morphogenic Protein (BMP) and allograft bone chips.  Pt's post op course has been complicated by hyperglycemia requiring endocrine involvement.     Provider Information   Primary Care Physician: Dr. Obregon at [facility] in [facility] will schedule PCP apt at discharge.   : None reported    Mental Health/Chemical Dependency:   Diagnosis: Pt reports feeling down lately but is looking forward to going home.   Alcohol/Tobacco/Narcotis: No tobacco use reported. Reports 2 drinks per week  Support/Services in Place: None reported  Services Needed/Recommended: [place] and Health Psychology support while on ARU available.   Sexuality/Intimacy: Not discussed     Support System  Marital Status: , wife Yolette  Family support: SonEric, in [place]. Son Rajeev, lives in [place].   Other support available: Good family and friend support    Community Resources  Current in home services: Pt denies  Previous services: None reported    Financial/Employment/Education  Employment Status: Retired. Served 1.5 years in the [employer]  Income Source: Social Security  Education: Not discussed   Financial Concerns:  Pt denies  Insurance: BCBS MEDICARE ADVANTAGE    Discharge Plan   Patient and family discharge goal: TBD, pending progress  Provided Education on discharge plan: Evaluations and discharge recommendations pending.   Patient agreeable to discharge plan:  Pending further discussion. Evaluations and discharge recommendations pending.   Provided education and attained signature for Medicare IM and IRF Patient Rights and Privacy Information provided to patient : YES  Provided patient with [facility] Resources: N/A  Barriers to discharge: Cognition    Discharge Recommendations   Disposition: See above   Transportation Needs: Patient, family/friends, paid transport, insurance transport (if applicable)     Additional comments   Discharge TBD, ELOS 8 days    SW will remain available and continue to follow as needs arise.  "    -------------------------------------------------------------------------------------------------------------  JIMMY Pain Assessment    Pain Effect on Sleep  Over the past 5 days, how much of the time has pain made it hard for you to sleep at night?\"    0. Does not apply - I have not had any pain or hurting in the past 5 days    -------------------------------------------------------------------------------------------------------------    NANI Morris  [facility], Acute Inpatient Rehab Unit   [address] [place] [address], [address] [place] [address]  Phone: [phone], Fax: [phone]  "

## 2024-09-26 NOTE — PROGRESS NOTES
"Individualized Overall Plan Of Care (IOPOC)      Rehab diagnosis/Impairment Group Code: Orthopaedic disorders 08.9 other orthopaedic: unstable t8-9 fracture s/p treatment and reduction of t9 vertebral body fracture, placement of bilateral percutaneous screws at t7 (left), t8, t9, t10 and t11, and t7-t11 posterior arthrodesis  Status post thoracic spinal fusion     Expected functional outcome: reach a level of mod I     Clinical Impression Comments: post thoracic spinal fusion with gait instability     Mobility: Pt presents to PT s/p multi-segment spinal fx and subsequent posterior fusion, resulting in increased pain. D/t pain levels, deconditioning, and spinal precautions, pt requires supervision for all ambulatory, transfer, and ADL tasks.  Pt would benefit from multi-discipline therapies to improve movement strategies to reduce pain levels and follow spinal precautions, thereby increasing functinoal capacity and independence. ELOS - 5 days.    ADL: Per chart \"Judah Leon is a 84 year old right hand dominant male with a PMH of right cerebral hemorrhage, CKD stage 3, DM2 on insulin, HLD, malignant prostate cancer, and HTN who sustained a T8-9 minimally displaced fracture and L1 compression fracture from a fall on 9/5/24 s/p T7-11 posterior fusion on 9/9/24 with a slow recovery due to post-operative pain with no overt neurological damage.  He was admitted to acute inpatient rehab on  9/24/24.\" Pt would benefit from skilled occupational therapy services to improve ind with ADLs/IADLs and safety with functional mobility to return closer to PLOF. ELOS 5 days    Communication/Cognition/Swallow: SLP: Motor speech, language intact. RBANS form A administered and interpreted. Pt overall score in mild cognitive impairment range for age related norms. Pt with deficits in attention, executive function, immediate and delayed recall. Skilled SLP services indicated to train pt in cognitive strategies to promote independence and " safety on the unit and upon return to home/community.     Intensity of therapy:   PT 60 minutes, 6x/week, for 10 days   OT 60 minutes, 6 times/week, for 10 days   SLP 60 minutes, 6 times/week, for 10 days     Orthotics  TLSO  Education wound care  Neuropsychology Testing: No        Medical Prognosis: good       Physician summary statement: post thoracic spinal fusion with gait instability, goal is to reach a level of mod I     Discharge destination: prior home  Discharge rehabilitation needs: outpatient, PT, OT, and SLP      Estimated length of stay: 10 days       Rehabilitation Physician Haim Ruby DO

## 2024-09-26 NOTE — PROGRESS NOTES
Martinez Balance Scale (BBS) Cutoff Scores: A score of < 45 /56 indicates an increased risk for falls.     The BBS is a measure of static and dynamic standing balance that has been validated in community dwelling elderly individuals and individuals who have Parkinson's Disease, MS, and those who are s/p CVA and TBI. The test is administered without an assistive device. Scores from the Martinez are used to determine the probability of falling based on the patient's previous history of falls and their test performance.     Minimal Detectable Change = 6.5   & Minimal Detectable Change (Parkinson's Disease) = 5 according to Rishi & Alexysey 2008    Assessment (rationale for performing, application to patient s function & care plan): Pt's score of 37/56 indicates that he is a falls risk. Currently he would benefit from amb with a FWW and supervision to increase safety with transfers and ambulation.      09/26/24 1500   Signing Clinician's Name / Credentials   Signing clinician's name / credentials CHEKO Tiwari   Martinez Balance Scale (MARTINEZ K, SARITA S, LUIS BOCANEGRA, NELIDA DASH: MEASURING BALANCE IN THE ELDERLY: VALIDATION OF AN INSTRUMENT. CAN. J. PUB. HEALTH, JULY/AUGUST SUPPLEMENT 2:S7-11, 1992.)   Sit To Stand 3   Standing Unsupported 4   Sitting Unsupported 4   Stand to Sit 4   Transfers 3   Standing with Eyes Closed 4   Standing Unsupported, Feet Together 2   Reach Forward With Outstretched Arm 4   Retrieve Object From Floor 0   Turning to Look Behind 2   Turn 360 Degrees 3   Placing Alternate Foot on Stool (4-6 inches) 0   Unsupported Tandem Stand (Demonstrate to Subject) 3   One Leg Stand 1   Total Score (A score of 45 or less has been correlated with an increased risk of falls)   Total Score (out of 56) 37

## 2024-09-26 NOTE — PLAN OF CARE
Post Rounds Family Discussion:    Staff involved: Hannah PT  Family involved: Yolette wife  Projected discharge date: Monday 9/30  Projected discharge location: Home with OP PT at [facility]  Equipment needs: FWW, bed wedge    Discussed current function and cognition. Anticipate MOD I with WW at home, but need for IADL assist and brace donning/doffing help. Wife able to provide 24-7 and expresses no concerns about discharge plan.

## 2024-09-26 NOTE — PROGRESS NOTES
Timed Up and Go (TUG): TUG is a test of basic mobility skills. It is used to screen individuals prone to falls.  Gait assistive device used: FWW     Patient score 14.2 seconds  ?13.5 seconds indicate at risk for falls in older adults according to Omaira et al 2000.  ?30 seconds - indicates dependency in most ADL and mobility skills according to Posjaswinder & Shawn 1991  >11.5 seconds indicate at risk for falls in adults with Parkinson's Disease    Minimal Detectable Change for patients with Alzheimer s = 4.09 sec   Minimal Detectable Change for patients with Parkinson s Disease = 3.5 sec   according to Gianfranco & Andrea Donato 2011    Normative Data from [place], Monica D, Nickolas M, David E, Phuc. 2017  Age                 Average Time (in seconds)  20-39                     5.9-7.4         40-59                     6.3-7.8   60-69                     7.1-9.0  70-79                     8.2-10.2  80-99                    10.0-12.7            Assessment (rationale for performing, application to patient s function & care plan): Pt's TUG time of 14.2 sec coupled with the necessity of utilizing a FWW indicates that he is at an increased risk of falls. He would benefit from continuing to use a FWW and supervision with all mobility tasks.

## 2024-09-26 NOTE — PROGRESS NOTES
[facility]   Acute Rehabilitation Unit  Daily progress note    INTERVAL HISTORY  Judah Leon was seen during team rounds.  Is having some intermittent confusion ongoing since surgery- suspect delirium, question some underlying impaired cog.  Vss, afebrile, no focal complaints, labs as below.   -monitor clinically for other symptoms  -continue delirium precations     Transitioning back to oral regimen for diabetes today- Cr up 1.63 9/26 from 1.49.  history of CKD 3b  with baseline Cr 1.4-1.6  (  CBC stable.)    Goals for home with mod I- stand by assist, and assist with medications, transportation, finances etc.  See rounds note by Dr. Ruby for further details.       MEDICATIONS  Current Facility-Administered Medications   Medication Dose Route Frequency Provider Last Rate Last Admin    acetaminophen (TYLENOL) tablet 975 mg  975 mg Oral TID David Strong MD   975 mg at 09/25/24 2040    amitriptyline (ELAVIL) tablet 10 mg  10 mg Oral QPM David Strong MD   10 mg at 09/25/24 2040    artificial tears ophthalmic ointment   Both Eyes At Bedtime David Strong MD   Given at 09/25/24 2040    famotidine (PEPCID) tablet 20 mg  20 mg Oral BID Ana Lilia Kong PA   20 mg at 09/25/24 2040    gabapentin (NEURONTIN) capsule 300 mg  300 mg Oral TID Ana Lilia Kong PA   300 mg at 09/25/24 2039    glipiZIDE (GLUCOTROL) half-tab 2.5 mg  2.5 mg Oral QAM AC Ana Lilia Kong PA        heparin ANTICOAGULANT injection 5,000 Units  5,000 Units Subcutaneous Q12H Ana Lilia Kong PA   5,000 Units at 09/25/24 2040    insulin aspart (NovoLOG) injection (RAPID ACTING)  1-10 Units Subcutaneous TID AC David Strong MD   1 Units at 09/25/24 0843    insulin aspart (NovoLOG) injection (RAPID ACTING)  1-7 Units Subcutaneous At Bedtime David Strong MD        magnesium oxide (MAG-OX) tablet 400 mg  400 mg Oral Daily Ana Lilia Kong PA        metFORMIN (GLUCOPHAGE XR) 24 hr  tablet 500 mg  500 mg Oral BID w/meals David Strong MD   500 mg at 09/25/24 1809    multivitamin w/minerals (THERA-VIT-M) tablet 1 tablet  1 tablet Oral Daily David Strong MD   1 tablet at 09/25/24 0828    senna-docusate (SENOKOT-S/PERICOLACE) 8.6-50 MG per tablet 2 tablet  2 tablet Oral At Bedtime Ana Lilia Kong PA   2 tablet at 09/25/24 2040    simvastatin (ZOCOR) tablet 40 mg  40 mg Oral QPM David Strong MD   40 mg at 09/25/24 2040    Vitamin D3 (CHOLECALCIFEROL) tablet 25 mcg  25 mcg Oral Daily David Strong MD   25 mcg at 09/25/24 0828          Current Facility-Administered Medications   Medication Dose Route Frequency Provider Last Rate Last Admin    bisacodyl (DULCOLAX) suppository 10 mg  10 mg Rectal Daily PRN David Strong MD        calcium carbonate (TUMS) chewable tablet 1,000 mg  1,000 mg Oral TID PRN David Strong MD        carboxymethylcellulose PF (REFRESH PLUS) 0.5 % ophthalmic solution 2 drop  2 drop Both Eyes Q1H PRN David Strong MD        glucose gel 15-30 g  15-30 g Oral Q15 Min PRN David Strong MD        Or    dextrose 50 % injection 25-50 mL  25-50 mL Intravenous Q15 Min PRN David Strong MD        Or    glucagon injection 1 mg  1 mg Subcutaneous Q15 Min PRN David Strong MD        hydrOXYzine HCl (ATARAX) tablet 10 mg  10 mg Oral TID PRN David Strong MD        melatonin tablet 3 mg  3 mg Oral At Bedtime PRN David Strong MD        methocarbamol (ROBAXIN) tablet 500 mg  500 mg Oral BID PRN Ana Lilia Kong PA        naloxone (NARCAN) injection 0.2 mg  0.2 mg Intravenous Q2 Min PRN Haim Ruby DO        Or    naloxone (NARCAN) injection 0.4 mg  0.4 mg Intravenous Q2 Min PRN Haim Ruby DO        Or    naloxone (NARCAN) injection 0.2 mg  0.2 mg Intramuscular Q2 Min PRN Haim Ruby DO        Or    naloxone (NARCAN) injection 0.4 mg  0.4 mg Intramuscular Q2 Min PRN Haim Ruby,         oxyCODONE IR (ROXICODONE)  "half-tab 2.5 mg  2.5 mg Oral BID PRN Ana Lilia Kong PA        Patient is already receiving anticoagulation with heparin, enoxaparin (LOVENOX), warfarin (COUMADIN)  or other anticoagulant medication   Does not apply Continuous PRN Ana Lilia Kong PA        polyethylene glycol (MIRALAX) Packet 17 g  17 g Oral BID PRN Ana Lilia Kong PA        QUEtiapine (SEROquel) half-tab 12.5 mg  12.5 mg Oral Q6H PRN Ana Lilia Kong PA            PHYSICAL EXAM  /67 (BP Location: Right arm)   Pulse 81   Temp 97.7  F (36.5  C) (Oral)   Resp 16   Ht 1.702 m (5' 7\")   Wt 74.4 kg (164 lb 1.6 oz)   SpO2 93%   BMI 25.70 kg/m    Gen: awake alert NAD  HEENT: mmm,   Pulm: non labored on room air  Abd: non distended wearing brace  Ext: without edema  Neuro/MSK: awake alert speech clear, moves extremities    LABS  CBC RESULTS:   Recent Labs   Lab Test 09/26/24  0543 09/24/24  0601 09/23/24  0428   WBC 4.8 5.6 7.0   RBC 3.19* 3.08* 3.12*   HGB 10.4* 10.2* 10.2*   HCT 31.0* 30.2* 30.3*   MCV 97 98 97   MCH 32.6 33.1* 32.7   MCHC 33.5 33.8 33.7   RDW 12.9 13.1 12.9    187 177     Last Basic Metabolic Panel:  Recent Labs   Lab Test 09/26/24  0543 09/25/24  2121 09/25/24  1737 09/24/24  0736 09/24/24  0601 09/23/24  0838 09/23/24  0428     --   --   --  137  --  137   POTASSIUM 4.3  --   --   --  4.2  --  4.7   CHLORIDE 106  --   --   --  105  --  103   CO2 22  --   --   --  22  --  24   ANIONGAP 11  --   --   --  10  --  10   * 162* 116*   < > 183*   < > 245*   BUN 36.4*  --   --   --  37.7*  --  39.5*   CR 1.63*  --   --   --  1.49*  --  1.57*   GFRESTIMATED 41*  --   --   --  45*  --  43*   ROB 9.7  --   --   --  9.7  --  9.5    < > = values in this interval not displayed.       Rehabilitation   Impairment group code: 08.9 unstable T8-9 fractures     PT, OT and SLP 60 minutes of each on a daily basis, in addition to rehab nursing and close management of physiatrist.    Impairment of ADL's: OT for " 60 min daily to work on upper and lower body self care, dressing, toileting, bathing, energy conservation techniques with use of ADs as needed.   Impairment of mobility:  PT for 60 min daily to work on gait exercises, strengthening, endurance buildup, transfers with use of walker as needed.   Impairment of cognition/language/swallow:  SLP for 60 min daily for cognitive evaluation and treatment strategies for higher level cognitive deficits and memory impairment.   Rehab RN to administer medication, patient education on medication taking, VS monitoring, bowel regimen, glucose monitoring and wound care/surgical wound dressing changes and monitoring.   Adjustment to disability:  Clinical psychology to eval and treat if needed      - continue comprehensive acute inpatient rehabilitation program with multidisciplinary approach including therapies, rehab nursing, and physiatry following. See interval history for updates.      ASSESSMENT AND PLAN    Judah Leon is a 84 year old right hand dominant male with a PMH of right cerebral hemorrhage, CKD stage 3, DM2 on insulin, HLD, malignant prostate cancer, and HTN who sustained a T8-9 minimally displaced fracture and L1 compression fracture from a fall on 9/5/24 s/p T7-11 posterior fusion on 9/9/24 with a slow recovery due to post-operative pain with no overt neurological damage.  He was admitted to acute inpatient rehab on  9/24/24.      #S/P Posterior thoracic fusion  T7-11 fusion performed 9/9/24.  Staples removed.  - leave wound open to air said per surgery, ok to cover and change dressing daily prn  No showering or submerging.    - precautions, thoracic lumbar spine.  TLSO when out of bed.  No bending or twisting back when repositioning  - precautions: no bending, twisting, or lifting > 10lbs.    -follow up neurosurgery  - acetaminophen 975mg TID  - reduce gabapentin 300 mg tid- continue to taper as indicated   - hydroxyzine 10mg  -discontinue oxycodone- not using,  denies pain could contribute to confusion  - methocarbamol 500mg BID PRN spasms, consider discontinuing  - PT, OT, and SLP     #Head injury during fall  #delirium  With 18 score on SLUMS and endorsement of confusion along with remote history of right head bleed, right SDH is on the differential.  However, no focal neurological signs.  - low threshold for head imaging if altered mental status or new focal neurological signs  -delirium precautions  - taper off gabapentin, discontinue oxycodone,     #HLD  - simvastatin 40mg every evening     #DM2  History of hyperglycemia during hospitalization.  Last A1c 8.2%.  - hypoglycemia protocol  - discontinue carb counting & lantus  -consult dietician for diet education-   -start glipizide 2.5 mg daily  - FSG QID  - insulin SSI high  - metformin 500mg BID with meals    #CKD 3 b: Baseline 1.4-1.6 Monitor with BMP, Cr 1.63 9/26.  Follows with nephro  - encourage hydration.       #Vitamin D deficiency  - vitamin D3 1000 units daily        #Depression  - amitriptyline 10mg every evening     #Sleep  - melatonin 3mg bedtime PRN      #Dry eyes: artificial tears, carboxymethylcellulose PF 0.5% 2 drop both eyes q1h prn      Adjustment to disability:  monitor mood  FEN: reg  Bowel: monitor  Bladder: monitor  DVT Prophylaxis: subcutaneous heparin   GI Prophylaxis: pepcid  Code: full   Disposition: goal for home   ELOS: 7 days  Follow up Appointments on Discharge:  Pcp, neurosurgery, nephro    35 minutes spent on the date of the encounter doing (chart review/review of outside records/review of test results/interpretation of tests/patient visit/documentation/discussion with other provider(s)/discussion with family    Ana Lilia Kong PA-C  Physical Medicine & Rehabilitation

## 2024-09-26 NOTE — PLAN OF CARE
Goal Outcome Evaluation:      Plan of Care Reviewed With: patient    Overall Patient Progress: no changeOverall Patient Progress: no change     Acute Rehab Care Conference/Team Rounds      Type: Team Rounds    Present: Dr. Ruby, Ana Lilia Kong PA, Dr. Fontaine Neuropsychologist, Hannah Levine PT, Minnie Jaime OT, Ron Sung SLP, Mary Bruce PCS, Sherry James , Guerline Ashford RD, Kit Gan RN, and Judah Leon Patient.      Discharge Barriers/Treatment/Education    Rehab Diagnosis: post thoracic spinal fusion with gait instability     Active Medical Co-morbidities/Prognosis:     Patient Active Problem List   Diagnosis    Thoracic compression fracture (H)    S/P fusion of thoracic spine    Cerebrovascular disease    Chronic kidney disease, stage 3b (H)    Controlled type 2 diabetes mellitus with stage 3 chronic kidney disease, without long-term current use of insulin (H)    Chronic rhinitis    Hyperlipidemia with target LDL less than 100    Primary hypertension    Nuclear sclerosis of left eye    Osteoarthritis of lumbar spine    Malignant neoplasm of prostate (H)    Status post thoracic spinal fusion         Safety: A1 with walker, alert with intermittent confusion/forgetful, can be impulsive, bed alarm ON, TLSO brace when OOB, frequent reminder to used the call light    Pain: pain managed by scheduled Tylenol, no reported pain overnight    Medications, Skin, Tubes/Lines: takes medication whole, mid thoracic incision PEYTON, no lines/tubes    Swallowing/Nutrition: Regular solids/thin liquids (0). Not evaluated by ARU SLP.     Bowel/Bladder: Continent of Bladder and Bowel, can be incontinent of Bladder d/t urgency, pt used the drinking cup instead of the urinal last night, urinal was beside the drinking cup.     Psychosocial: SW assessment pending.     ADLs/IADLs: Pt is early in his stay but doing well. Pt requires more assist with ADL at this time. Goal is for mod I.     Mobility: Pt  presents to PT s/p multi-segment spinal fx and subsequent posterior fusion, resulting in increased pain. D/t pain levels, deconditioning, and spinal precautions, pt requires supervision for all ambulatory, transfer, and ADL tasks. Pt would benefit from multi-discipline therapies to improve movement strategies to reduce pain levels and follow spinal precautions, thereby increasing functinoal capacity and independence. ELOS - 5 days     Cognition/Language: Motor speech, language intact  RBANS form A administered and interpreted, pt score in mild cognitive impairment with deficits in attention, executive function,  greatest impairments in immediate and delayed recall. ARU SLP to train in cognitive strategies to compensate for deficits and promote insight, safety. Pt reporting some cognitive decline over past few years, pt may benefit from outpatient neuropsychology for more thorough neurologic workup.    Community Re-Entry: reach a level of mod I     Transportation Not a  d/t spinal precautions, family to provide.    Decision maker: self    Plan of Care and goals reviewed and updated.    Discharge Plan/Recommendations    Fall Precautions: continue    Patient/Family input to goals: yes     Estimated length of stay: 10 days     Overall plan for the patient: reach a level of mod I        Utilization Review and Continued Stay Justification    Medical Necessity Criteria:    For any criteria that is not met, please document reason and plan for discharge, transfer, or modification of plan of care to address.    Requires intensive rehabilitation program to treat functional deficits?: Yes    Requires 3x per week or greater involvement of rehabilitation physician to oversee rehabilitation program?: Yes    Requires rehabilitation nursing interventions?: Yes    Patient is making functional progress?: Yes    There is a potential for additional functional progress? Yes    Patient is participating in therapy 3 hours per day a  minimum of 5 days per week or 15 hours per week in 7 day period?:Yes    Has discharge needs that require coordinated discharge planning approach?:Yes      Barriers/Concerns related to meeting medical necessity criteria:  none    Team Plan to Address Concern:  As needed       Final Physician Sign off    Statement of Approval:     I agree with all the recommendations detailed in this document.    Haim Ruby, DO      Patient Goals  Social Work Goals: Confirm discharge recommendations with therapy, coordinate safe discharge plan and remain available to support and assist as needed.       Therapy Frequency (OT): 6 times/week  OT: Hygiene/Grooming: modified independent, within precautions  OT: Upper Body Dressing: Modified independent, within precautions  OT: Lower Body Dressing: Modified independent, within precautions  OT: Lower Body Bathing: Modified independent, with precautions  OT: Bed Mobility: Modified independent  OT: Toilet Transfer/Toileting: Modified independent  OT: Goal 1: Pt will be mod I for tub/shower transfer with use of grab bar.          PT Predicted Duration/Target Date for Goal Attainment: 09/30/24  PT Frequency: 6x/week  PT: Transfers: Modified independent, Bed to/from chair, Assistive device  PT: Gait: Modified independent, Rolling walker, Greater than 200 feet  PT: Stairs: Modified independent, Greater than 10 stairs, Rail on both sides  PT: Goal 1: Car transfer: pt will be MOD I getting into family SUV.                         SLP Predicted Duration/Target Date for Goal Attainment: 09/30/24  Therapy Frequency (SLP Eval): 6 times/week  SLP: Goal 1: With use of trained memory strategies, patient will recall daily/functional information with 80% or greater independent accuracy.  SLP: Goal 2: Patient will complete high level reasoning/problem solving tasks with 90% or greater independent accuracy.  SLP: Goal 3: Patient will complete high level attention (alternating, divided) tasks with 90%  or greater independent accuracy.          Patient Goal:  Pain Management: Patient will communicate pain with nurse, and will be able to report low score for pain with ambulation                             Goal: Skin Integrity: patient will reposition himself in bed or chair to help prevent pressure injury or skin breakdown.                    Goal: Safety Management: patient will use the call light when in need of assistance and notify nurse/staff as needed to prevent falls and promote safety.

## 2024-09-26 NOTE — PLAN OF CARE
"Goal Outcome Evaluation:      Plan of Care Reviewed With: patient    Overall Patient Progress: no change  VS: /67 (BP Location: Right arm)   Pulse 81   Temp 97.7  F (36.5  C) (Oral)   Resp 16   Ht 1.702 m (5' 7\")   Wt 74.4 kg (164 lb 1.6 oz)   SpO2 93%   BMI 25.70 kg/m       O2: SpO2 93% and stable on RA. Denies chest pain and SOB.    Output: Voids spontaneously without difficulty to bathroom .   Last BM: 9/25,  BS active / passing flatus.    Activity: Assist of x 1 walker and GB.   Skin: WDL except,    Pain: Denies pain.   CMS: Intact, Alert and Oriented x 3 with intermittent confusion.    Dressing:    Diet: Regular diet, thin liquids and takes pills whole.  BG checks before meals and at bedtime. BG check at bedtime 162.   LDA: No PIV access.    Equipment: Personal belongings,    Plan: Continue with plan of care. Call light within reach, pt able to make needs known.    Additional Info: Pt wears TLSO brace when OOB.              "

## 2024-09-26 NOTE — PROGRESS NOTES
6 Minute Walk Test    Setup:  20 meter walk way setup in hallway between PT and OT gyms designated by orange cones at each end and distance included in turns. Seats setup at end of walkway for rest break prn.    Distance Ambulated: 980' with FWW    Total Rest Time During 6MWT: none    Assessment: Pt's distance of 980' with a FWW and SBA-CGA during 6MWT indicates that his activity tolerance is below his age and sex-matched norms. His distance indicates that he is able to tolerate limited community ambulation with his FWW when supervised. Pt still demonstrates greatest difficulty with turns, especially when fatigued, requiring CGA and cueing to move in a controlled fashion to reduce LOB.    Age Related Norms in Community Dwelling Adults:  Age  Male   Female  60-69 yrs 572 m (1864 ft) 538 m (1753 ft)  70-79 yrs 527 m (1718 ft) 471 m (1535 ft)  80-89 yrs 417 m (1359 ft) 392 m (1278 ft)    (Rishi et al, 2002; n = 96; community dwelling elderly people with independent function who were nonsmokers with no history of dizziness; > 59 yo and did not use assistive devices, Community-dwelling Elderly)

## 2024-09-27 ENCOUNTER — APPOINTMENT (OUTPATIENT)
Dept: PHYSICAL THERAPY | Facility: CLINIC | Age: 84
DRG: 561 | End: 2024-09-27
Attending: STUDENT IN AN ORGANIZED HEALTH CARE EDUCATION/TRAINING PROGRAM
Payer: COMMERCIAL

## 2024-09-27 ENCOUNTER — APPOINTMENT (OUTPATIENT)
Dept: SPEECH THERAPY | Facility: CLINIC | Age: 84
DRG: 561 | End: 2024-09-27
Attending: STUDENT IN AN ORGANIZED HEALTH CARE EDUCATION/TRAINING PROGRAM
Payer: COMMERCIAL

## 2024-09-27 ENCOUNTER — APPOINTMENT (OUTPATIENT)
Dept: OCCUPATIONAL THERAPY | Facility: CLINIC | Age: 84
DRG: 561 | End: 2024-09-27
Attending: STUDENT IN AN ORGANIZED HEALTH CARE EDUCATION/TRAINING PROGRAM
Payer: COMMERCIAL

## 2024-09-27 LAB
GLUCOSE BLDC GLUCOMTR-MCNC: 121 MG/DL (ref 70–99)
GLUCOSE BLDC GLUCOMTR-MCNC: 198 MG/DL (ref 70–99)

## 2024-09-27 PROCEDURE — 97110 THERAPEUTIC EXERCISES: CPT | Mod: GP

## 2024-09-27 PROCEDURE — 97129 THER IVNTJ 1ST 15 MIN: CPT | Mod: GN | Performed by: SPEECH-LANGUAGE PATHOLOGIST

## 2024-09-27 PROCEDURE — 250N000013 HC RX MED GY IP 250 OP 250 PS 637: Performed by: PHYSICIAN ASSISTANT

## 2024-09-27 PROCEDURE — 250N000013 HC RX MED GY IP 250 OP 250 PS 637

## 2024-09-27 PROCEDURE — 128N000003 HC R&B REHAB

## 2024-09-27 PROCEDURE — 97130 THER IVNTJ EA ADDL 15 MIN: CPT | Mod: GN | Performed by: SPEECH-LANGUAGE PATHOLOGIST

## 2024-09-27 PROCEDURE — 97535 SELF CARE MNGMENT TRAINING: CPT | Mod: GO | Performed by: STUDENT IN AN ORGANIZED HEALTH CARE EDUCATION/TRAINING PROGRAM

## 2024-09-27 PROCEDURE — 99232 SBSQ HOSP IP/OBS MODERATE 35: CPT | Performed by: PHYSICIAN ASSISTANT

## 2024-09-27 PROCEDURE — 97112 NEUROMUSCULAR REEDUCATION: CPT | Mod: GP

## 2024-09-27 RX ORDER — POLYETHYLENE GLYCOL 3350 17 G/17G
17 POWDER, FOR SOLUTION ORAL DAILY
Status: DISCONTINUED | OUTPATIENT
Start: 2024-09-27 | End: 2024-09-30 | Stop reason: HOSPADM

## 2024-09-27 RX ADMIN — Medication 2.5 MG: at 06:38

## 2024-09-27 RX ADMIN — ACETAMINOPHEN 975 MG: 325 TABLET ORAL at 20:27

## 2024-09-27 RX ADMIN — Medication 25 MCG: at 10:04

## 2024-09-27 RX ADMIN — ACETAMINOPHEN 975 MG: 325 TABLET ORAL at 10:05

## 2024-09-27 RX ADMIN — SENNOSIDES AND DOCUSATE SODIUM 2 TABLET: 50; 8.6 TABLET ORAL at 20:27

## 2024-09-27 RX ADMIN — METHOCARBAMOL 500 MG: 500 TABLET ORAL at 00:34

## 2024-09-27 RX ADMIN — AMITRIPTYLINE HYDROCHLORIDE 10 MG: 10 TABLET ORAL at 20:27

## 2024-09-27 RX ADMIN — ACETAMINOPHEN 975 MG: 325 TABLET ORAL at 14:39

## 2024-09-27 RX ADMIN — METFORMIN HYDROCHLORIDE 500 MG: 500 TABLET, EXTENDED RELEASE ORAL at 10:05

## 2024-09-27 RX ADMIN — SIMVASTATIN 40 MG: 20 TABLET, FILM COATED ORAL at 20:27

## 2024-09-27 RX ADMIN — POLYETHYLENE GLYCOL 3350 17 G: 17 POWDER, FOR SOLUTION ORAL at 14:39

## 2024-09-27 RX ADMIN — Medication 12.5 MG: at 00:34

## 2024-09-27 RX ADMIN — Medication 1 TABLET: at 10:04

## 2024-09-27 RX ADMIN — FAMOTIDINE 20 MG: 20 TABLET ORAL at 20:27

## 2024-09-27 RX ADMIN — MAGNESIUM OXIDE TAB 400 MG (241.3 MG ELEMENTAL MG) 400 MG: 400 (241.3 MG) TAB at 10:04

## 2024-09-27 RX ADMIN — FAMOTIDINE 20 MG: 20 TABLET ORAL at 10:04

## 2024-09-27 RX ADMIN — GABAPENTIN 300 MG: 300 CAPSULE ORAL at 10:04

## 2024-09-27 RX ADMIN — METFORMIN HYDROCHLORIDE 500 MG: 500 TABLET, EXTENDED RELEASE ORAL at 17:30

## 2024-09-27 RX ADMIN — Medication: at 20:27

## 2024-09-27 ASSESSMENT — ACTIVITIES OF DAILY LIVING (ADL)
ADLS_ACUITY_SCORE: 30
ADLS_ACUITY_SCORE: 29
ADLS_ACUITY_SCORE: 30
ADLS_ACUITY_SCORE: 29
ADLS_ACUITY_SCORE: 30
ADLS_ACUITY_SCORE: 29
ADLS_ACUITY_SCORE: 30
ADLS_ACUITY_SCORE: 29
ADLS_ACUITY_SCORE: 30
ADLS_ACUITY_SCORE: 29
ADLS_ACUITY_SCORE: 30
ADLS_ACUITY_SCORE: 29
ADLS_ACUITY_SCORE: 30
ADLS_ACUITY_SCORE: 29
ADLS_ACUITY_SCORE: 30

## 2024-09-27 NOTE — PLAN OF CARE
"Goal Outcome Evaluation:      Plan of Care Reviewed With: patient    Overall Patient Progress: improvingOverall Patient Progress: improving       VS: /70 (BP Location: Left arm)   Pulse 80   Temp 97.7  F (36.5  C) (Oral)   Resp 18   Ht 1.702 m (5' 7\")   Wt 74.4 kg (164 lb 1.6 oz)   SpO2 98%   BMI 25.70 kg/m     O2: SpO2 > 98% on RA    Output: Voids spontaneously and uses a urinal at bed side with staff helping.   Last BM: 9/25    Activity: Up with Ax2 with a walker and GB   Skin: WDL    Pain: Pain managed with scheduled and prn with relief.   CMS: Intact, Alert and oriented. Denies numbness and tingling.   Dressing: None    Diet: Regular diet, thin liquids takes pills whole. Diabetic see flow sheet with more details.    LDA: None   Equipment: None   Plan: Precautions maintained, Continue with plan of care. Call light within reach, Bed alarm on. Able to use a call light appropriately.    Additional Info: Requested Robaxin and Seroquel.          "

## 2024-09-27 NOTE — PLAN OF CARE
Discharge Planner Post-Acute Rehab OT:      Discharge Plan: Home with HHOT     Precautions: fall, Spinal:No bending or twisting back when repositioning, No lifting > 10lbs, TLSO brace when OOB.     Current Status:  ADLs:  Mobility: SBA FWW   Grooming: SBA standing with FWW  Dressing: UB: set up for shirt, min A for TLSO, LB: min A to adhere to spinal prec. Feet: Min A   Bathing: sponge bath due to orders saying no showering at this time  Toileting: CGA for transfer with transfer, CGA for poonam cares, clothing  IADLs:  Vision/Cognition: TBD, would benefit from further cog assessment      Assessment: Close SBA with walker for ADL, cues for safe practices. MIN A for TLSO. Recommend tub bench for home when pt is given permission to shower.       Other Barriers to Discharge (DME, Family Training, etc):    DME-recommend tubbench   family training- family to provide 24/7 and assist for IADL

## 2024-09-27 NOTE — PROGRESS NOTES
[facility]   Acute Rehabilitation Unit  Daily progress note    INTERVAL HISTORY  Judah Leon was seen, completing therapy in gym, says he did not sleep well last night due to noise.  Denies pain, would like to simplify medication regimen as much as possible.  Glucose looking good on oral meds- discontinue ssi, reduce checks to bid.   -discontinue gabapentin  Says he is constipated has not had bm in several days  -start miralax  -continue senna     VSS, afebrile incision cdi with small distal skin tear cdi,     MEDICATIONS  Current Facility-Administered Medications   Medication Dose Route Frequency Provider Last Rate Last Admin    acetaminophen (TYLENOL) tablet 975 mg  975 mg Oral TID David Strong MD   975 mg at 09/26/24 2057    amitriptyline (ELAVIL) tablet 10 mg  10 mg Oral QPM David Strong MD   10 mg at 09/26/24 2057    artificial tears ophthalmic ointment   Both Eyes At Bedtime David Strong MD   Given at 09/26/24 2105    famotidine (PEPCID) tablet 20 mg  20 mg Oral BID Ana Lilia Kong PA   20 mg at 09/26/24 2057    gabapentin (NEURONTIN) capsule 300 mg  300 mg Oral TID Ana Lilia Kong PA   300 mg at 09/26/24 2056    glipiZIDE (GLUCOTROL) half-tab 2.5 mg  2.5 mg Oral QAM AC Ana Lilia Kong PA   2.5 mg at 09/27/24 0638    heparin ANTICOAGULANT injection 5,000 Units  5,000 Units Subcutaneous Q12H Ana Lilia Kong PA   5,000 Units at 09/26/24 2057    insulin aspart (NovoLOG) injection (RAPID ACTING)  1-10 Units Subcutaneous TID AC David Strong MD   1 Units at 09/25/24 0843    insulin aspart (NovoLOG) injection (RAPID ACTING)  1-7 Units Subcutaneous At Bedtime David Strong MD        magnesium oxide (MAG-OX) tablet 400 mg  400 mg Oral Daily Ana Lilia Kong PA   400 mg at 09/26/24 0829    metFORMIN (GLUCOPHAGE XR) 24 hr tablet 500 mg  500 mg Oral BID w/meals David Strong MD   500 mg at 09/26/24 0831    multivitamin w/minerals  (THERA-VIT-M) tablet 1 tablet  1 tablet Oral Daily David Strong MD   1 tablet at 09/26/24 0830    senna-docusate (SENOKOT-S/PERICOLACE) 8.6-50 MG per tablet 2 tablet  2 tablet Oral At Bedtime Ana Lilia Kong PA   2 tablet at 09/26/24 2057    simvastatin (ZOCOR) tablet 40 mg  40 mg Oral QPM David Strong MD   40 mg at 09/26/24 2057    Vitamin D3 (CHOLECALCIFEROL) tablet 25 mcg  25 mcg Oral Daily David Strong MD   25 mcg at 09/26/24 0831          Current Facility-Administered Medications   Medication Dose Route Frequency Provider Last Rate Last Admin    bisacodyl (DULCOLAX) suppository 10 mg  10 mg Rectal Daily PRN David Strong MD        calcium carbonate (TUMS) chewable tablet 1,000 mg  1,000 mg Oral TID PRN David Strong MD        carboxymethylcellulose PF (REFRESH PLUS) 0.5 % ophthalmic solution 2 drop  2 drop Both Eyes Q1H PRN David Strong MD        glucose gel 15-30 g  15-30 g Oral Q15 Min PRN David Strong MD        Or    dextrose 50 % injection 25-50 mL  25-50 mL Intravenous Q15 Min PRN David Strong MD        Or    glucagon injection 1 mg  1 mg Subcutaneous Q15 Min PRN David Strong MD        hydrOXYzine HCl (ATARAX) tablet 10 mg  10 mg Oral TID PRN David Strong MD        melatonin tablet 3 mg  3 mg Oral At Bedtime PRN David Strong MD        methocarbamol (ROBAXIN) tablet 500 mg  500 mg Oral BID PRN Ana Lilia Kong PA   500 mg at 09/27/24 0034    naloxone (NARCAN) injection 0.2 mg  0.2 mg Intravenous Q2 Min PRN Haim Ruby DO        Or    naloxone (NARCAN) injection 0.4 mg  0.4 mg Intravenous Q2 Min PRN Haim Ruby DO        Or    naloxone (NARCAN) injection 0.2 mg  0.2 mg Intramuscular Q2 Min PRN Haim Ruby DO        Or    naloxone (NARCAN) injection 0.4 mg  0.4 mg Intramuscular Q2 Min PRN Haim Ruby DO        Patient is already receiving anticoagulation with heparin, enoxaparin (LOVENOX), warfarin (COUMADIN)  or other  "anticoagulant medication   Does not apply Continuous PRN Ana Lilia Kong PA        polyethylene glycol (MIRALAX) Packet 17 g  17 g Oral BID PRN Ana Lilia Kong PA        QUEtiapine (SEROquel) half-tab 12.5 mg  12.5 mg Oral Q6H PRN Ana Lilia Kong PA   12.5 mg at 09/27/24 0034        PHYSICAL EXAM  /70 (BP Location: Left arm)   Pulse 80   Temp 97.7  F (36.5  C) (Oral)   Resp 18   Ht 1.702 m (5' 7\")   Wt 74.4 kg (164 lb 1.6 oz)   SpO2 98%   BMI 25.70 kg/m    Gen: awake alert NAD  HEENT: mmm,   CV: rrr   Pulm: non labored clear on room air  Abd: non distended wearing brace  Ext: without edema  Neuro/MSK: awake alert speech clear, moves all  extremities  Skin: incision cdi with scant redness below incision see above    LABS  CBC RESULTS:   Recent Labs   Lab Test 09/26/24  0543 09/24/24  0601 09/23/24  0428   WBC 4.8 5.6 7.0   RBC 3.19* 3.08* 3.12*   HGB 10.4* 10.2* 10.2*   HCT 31.0* 30.2* 30.3*   MCV 97 98 97   MCH 32.6 33.1* 32.7   MCHC 33.5 33.8 33.7   RDW 12.9 13.1 12.9    187 177     Last Basic Metabolic Panel:  Recent Labs   Lab Test 09/26/24  2132 09/26/24  1715 09/26/24  1200 09/26/24  0914 09/26/24  0543 09/24/24  0736 09/24/24  0601 09/23/24  0838 09/23/24  0428   NA  --   --   --   --  139  --  137  --  137   POTASSIUM  --   --   --   --  4.3  --  4.2  --  4.7   CHLORIDE  --   --   --   --  106  --  105  --  103   CO2  --   --   --   --  22  --  22  --  24   ANIONGAP  --   --   --   --  11  --  10  --  10   * 119* 108*   < > 158*   < > 183*   < > 245*   BUN  --   --   --   --  36.4*  --  37.7*  --  39.5*   CR  --   --   --   --  1.63*  --  1.49*  --  1.57*   GFRESTIMATED  --   --   --   --  41*  --  45*  --  43*   ROB  --   --   --   --  9.7  --  9.7  --  9.5    < > = values in this interval not displayed.       Rehabilitation   Impairment group code: 08.9 unstable T8-9 fractures     PT, OT and SLP 60 minutes of each on a daily basis, in addition to rehab nursing and " close management of physiatrist.    Impairment of ADL's: OT for 60 min daily to work on upper and lower body self care, dressing, toileting, bathing, energy conservation techniques with use of ADs as needed.   Impairment of mobility:  PT for 60 min daily to work on gait exercises, strengthening, endurance buildup, transfers with use of walker as needed.   Impairment of cognition/language/swallow:  SLP for 60 min daily for cognitive evaluation and treatment strategies for higher level cognitive deficits and memory impairment.   Rehab RN to administer medication, patient education on medication taking, VS monitoring, bowel regimen, glucose monitoring and wound care/surgical wound dressing changes and monitoring.   Adjustment to disability:  Clinical psychology to eval and treat if needed      - continue comprehensive acute inpatient rehabilitation program with multidisciplinary approach including therapies, rehab nursing, and physiatry following. See interval history for updates.      ASSESSMENT AND PLAN    Judah Leon is a 84 year old right hand dominant male with a PMH of right cerebral hemorrhage, CKD stage 3, DM2 on insulin, HLD, malignant prostate cancer, and HTN who sustained a T8-9 minimally displaced fracture and L1 compression fracture from a fall on 9/5/24 s/p T7-11 posterior fusion on 9/9/24 with a slow recovery due to post-operative pain with no overt neurological damage.  He was admitted to acute inpatient rehab on  9/24/24.      #S/P Posterior thoracic fusion  T7-11 fusion performed 9/9/24.  Staples removed.  - leave wound open to air said per surgery, ok to cover and change dressing daily prn  No showering or submerging.    - precautions, thoracic lumbar spine.  TLSO when out of bed.  No bending or twisting back when repositioning  - precautions: no bending, twisting, or lifting > 10lbs.    -follow up neurosurgery  - acetaminophen 975mg TID  - discontinue gabapentin  - hydroxyzine 10mg prn  -  methocarbamol 500mg BID PRN spasms, consider discontinuing  - PT, OT, and SLP     #Head injury during fall  #delirium  With 18 score on SLUMS and endorsement of confusion along with remote history of right head bleed, right SDH is on the differential.  However, no focal neurological signs.  - low threshold for head imaging if altered mental status or new focal neurological signs  -delirium precautions  - discontinue gabapentin- discontinued oxycodone,     #HLD  - simvastatin 40mg every evening     #DM2  History of hyperglycemia during hospitalization.  Last A1c 8.2%.  - hypoglycemia protocol  - discontinue ssi  -reduce glucose checks- to bid and prn   - metformin 500mg BID with meals  -glipizide 2.5 mg daily (started 9/26)  -monitor and titrate as indicated    #CKD 3 b: Baseline 1.4-1.6 Monitor with BMP, Cr 1.63 9/26.  Follows with nephro  - encourage hydration.       #Vitamin D deficiency  - vitamin D3 1000 units daily        #Depression  - amitriptyline 10mg every evening     #Sleep  - melatonin 3mg bedtime PRN      #Dry eyes: artificial tears, carboxymethylcellulose PF 0.5% 2 drop both eyes q1h prn      Adjustment to disability:  monitor mood  FEN: reg  Bowel: monitor  Bladder: monitor  DVT Prophylaxis: subcutaneous heparin   GI Prophylaxis: pepcid  Code: full   Disposition: goal for home   ELOS: 7 days  Follow up Appointments on Discharge:  Pcp, neurosurgery, nephro    35 minutes spent on the date of the encounter doing (chart review/review of outside records/review of test results/interpretation of tests/patient visit/documentation/discussion with other provider(s)/discussion with family    Ana Lilia Kong PA-C  Physical Medicine & Rehabilitation

## 2024-09-27 NOTE — PLAN OF CARE
Discharge Planner Post-Acute Rehab PT:     Discharge Plan: Home with OP PT, walker based    Precautions:  Spinal:  TLSO when out of bed  No bending or twisting back when repositioning  No lifting > 10lbs    Current Status:  Bed Mobility: MOD I  Transfer: CGA with WW  Gait: SBA with WW  Stairs: CGA 12 stairs with B rails  Balance: Mild instability - pending outcomes    Outcome Measures:   Siddiqui   TU.2 sec with FWW  6MWT: 980' with FWW    Assessment:  Pt demonstrates appropriate strategies for dynamic and static balance tasks. Will continue to work on improving activity tolerance, as pt requires frequent standing or seated rest breaks.     Other Barriers to Discharge (DME, Family Training, etc):   Family education  Falls assessment

## 2024-09-27 NOTE — PLAN OF CARE
Discharge Planner Post-Acute Rehab SLP:     Discharge Plan: home with wife and increased assist from family. No ongoing SLP anticipated. Recommend outpatient neuropsych to further evaluate cognitive decline over recent years.    Precautions: TLSO/spine    Current Status:  Hearing: WFL, tinnitus.  Vision: Distance glasses  Communication: Motor speech, language intact  Cognition: Mild cognitive impairment; attention, executive function, immediate and delayed recall  Swallow: Regular solids/thin liquids (0). Not evaluated by ARU SLP.    Assessment: Facilitated discussion with pt's medication management techniques prior to hospitalization. Pt reported setting up medications in a.m. and p.m. pill boxes, keeps in bathroom. Takes a.m. meds during waking bathroom tasks, p.m. meds while in bathroom getting ready for bed. Instructed pt in medication label reading task, reading portion only given this day due to pt's reclined position in bed for back discomfort. Pt answered questions with 100% accuracy.     Other Barriers to Discharge (Family Training, etc): level of supervision/assist for iADLS?

## 2024-09-27 NOTE — PLAN OF CARE
Shift 0700 to 1530:    Goal Outcome Evaluation:      Plan of Care Reviewed With: patient    Overall Patient Progress: improving    Outcome Evaluation: Insulin discontinued this shift     Pt A&Ox4, forgetful, denies SOB, CP, nausea, vomiting, diarrhea, constipation, numbness, and tingling. Pt endorses chronic pain, but increased to 5/10 near end of shift d/t positioning, pt repositioned and scheduled medications given, pt reports improvement. Combination/regular diet, takes medications whole x1 at a time with thin liquids. Continent of B&B, LBM 9/25. Ax1 walker/GB for transfers. TLSO brace when OOB. No LDAs. Skin intact except for spinal incision, PEYTON. Pt has call light in reach, calls appropriately, and has no unanswered questions or concerns at end of shift. Continue POC.

## 2024-09-28 ENCOUNTER — APPOINTMENT (OUTPATIENT)
Dept: SPEECH THERAPY | Facility: CLINIC | Age: 84
DRG: 561 | End: 2024-09-28
Attending: STUDENT IN AN ORGANIZED HEALTH CARE EDUCATION/TRAINING PROGRAM
Payer: COMMERCIAL

## 2024-09-28 ENCOUNTER — APPOINTMENT (OUTPATIENT)
Dept: PHYSICAL THERAPY | Facility: CLINIC | Age: 84
DRG: 561 | End: 2024-09-28
Attending: STUDENT IN AN ORGANIZED HEALTH CARE EDUCATION/TRAINING PROGRAM
Payer: COMMERCIAL

## 2024-09-28 ENCOUNTER — APPOINTMENT (OUTPATIENT)
Dept: OCCUPATIONAL THERAPY | Facility: CLINIC | Age: 84
DRG: 561 | End: 2024-09-28
Attending: STUDENT IN AN ORGANIZED HEALTH CARE EDUCATION/TRAINING PROGRAM
Payer: COMMERCIAL

## 2024-09-28 LAB
GLUCOSE BLDC GLUCOMTR-MCNC: 100 MG/DL (ref 70–99)
GLUCOSE BLDC GLUCOMTR-MCNC: 108 MG/DL (ref 70–99)
GLUCOSE BLDC GLUCOMTR-MCNC: 235 MG/DL (ref 70–99)
GLUCOSE BLDC GLUCOMTR-MCNC: 299 MG/DL (ref 70–99)

## 2024-09-28 PROCEDURE — 99231 SBSQ HOSP IP/OBS SF/LOW 25: CPT | Mod: GC

## 2024-09-28 PROCEDURE — 250N000013 HC RX MED GY IP 250 OP 250 PS 637: Performed by: STUDENT IN AN ORGANIZED HEALTH CARE EDUCATION/TRAINING PROGRAM

## 2024-09-28 PROCEDURE — 97129 THER IVNTJ 1ST 15 MIN: CPT | Mod: GN

## 2024-09-28 PROCEDURE — 128N000003 HC R&B REHAB

## 2024-09-28 PROCEDURE — 97535 SELF CARE MNGMENT TRAINING: CPT | Mod: GO

## 2024-09-28 PROCEDURE — 250N000013 HC RX MED GY IP 250 OP 250 PS 637: Performed by: PHYSICIAN ASSISTANT

## 2024-09-28 PROCEDURE — 97130 THER IVNTJ EA ADDL 15 MIN: CPT | Mod: GN

## 2024-09-28 PROCEDURE — 97150 GROUP THERAPEUTIC PROCEDURES: CPT | Mod: GP | Performed by: PHYSICAL THERAPIST

## 2024-09-28 PROCEDURE — 250N000013 HC RX MED GY IP 250 OP 250 PS 637

## 2024-09-28 RX ORDER — BISACODYL 10 MG
10 SUPPOSITORY, RECTAL RECTAL ONCE
Status: COMPLETED | OUTPATIENT
Start: 2024-09-28 | End: 2024-09-28

## 2024-09-28 RX ORDER — LANOLIN ALCOHOL/MO/W.PET/CERES
3 CREAM (GRAM) TOPICAL AT BEDTIME
Status: DISCONTINUED | OUTPATIENT
Start: 2024-09-28 | End: 2024-09-30 | Stop reason: HOSPADM

## 2024-09-28 RX ADMIN — METFORMIN HYDROCHLORIDE 500 MG: 500 TABLET, EXTENDED RELEASE ORAL at 17:23

## 2024-09-28 RX ADMIN — SENNOSIDES AND DOCUSATE SODIUM 2 TABLET: 50; 8.6 TABLET ORAL at 20:30

## 2024-09-28 RX ADMIN — Medication 1 TABLET: at 09:07

## 2024-09-28 RX ADMIN — Medication 2.5 MG: at 09:07

## 2024-09-28 RX ADMIN — ACETAMINOPHEN 975 MG: 325 TABLET ORAL at 09:06

## 2024-09-28 RX ADMIN — FAMOTIDINE 20 MG: 20 TABLET ORAL at 09:07

## 2024-09-28 RX ADMIN — ACETAMINOPHEN 975 MG: 325 TABLET ORAL at 13:20

## 2024-09-28 RX ADMIN — AMITRIPTYLINE HYDROCHLORIDE 10 MG: 10 TABLET ORAL at 20:31

## 2024-09-28 RX ADMIN — POLYETHYLENE GLYCOL 3350 17 G: 17 POWDER, FOR SOLUTION ORAL at 09:06

## 2024-09-28 RX ADMIN — MAGNESIUM OXIDE TAB 400 MG (241.3 MG ELEMENTAL MG) 400 MG: 400 (241.3 MG) TAB at 09:06

## 2024-09-28 RX ADMIN — Medication: at 20:31

## 2024-09-28 RX ADMIN — Medication 3 MG: at 20:31

## 2024-09-28 RX ADMIN — Medication 25 MCG: at 09:07

## 2024-09-28 RX ADMIN — SIMVASTATIN 40 MG: 20 TABLET, FILM COATED ORAL at 20:31

## 2024-09-28 RX ADMIN — ACETAMINOPHEN 975 MG: 325 TABLET ORAL at 20:31

## 2024-09-28 RX ADMIN — FAMOTIDINE 20 MG: 20 TABLET ORAL at 20:31

## 2024-09-28 RX ADMIN — METHOCARBAMOL 500 MG: 500 TABLET ORAL at 05:17

## 2024-09-28 RX ADMIN — METFORMIN HYDROCHLORIDE 500 MG: 500 TABLET, EXTENDED RELEASE ORAL at 09:07

## 2024-09-28 ASSESSMENT — ACTIVITIES OF DAILY LIVING (ADL)
ADLS_ACUITY_SCORE: 29
ADLS_ACUITY_SCORE: 33
ADLS_ACUITY_SCORE: 29

## 2024-09-28 NOTE — PLAN OF CARE
Goal Outcome Evaluation:      Plan of Care Reviewed With: patient    Overall Patient Progress: no changeOverall Patient Progress: no change  Alert and oriented. Able to make needs known. A1 walker and gait belt for transfers. On regular diet thin liquids and meds whole. BGM BID. Continent of bowel and bladder. LBM 9/25. Uses call light appropriately. Wife at bedside most of the shift. Denied pain/SOB throughout the shift. Continue with POC

## 2024-09-28 NOTE — PLAN OF CARE
FOCUS/GOAL  Medication management, Mobility, and Prevention of secondary complications    ASSESSMENT, INTERVENTIONS AND CONTINUING PLAN FOR GOAL:    Spinal infusion wound PEYTON.  Pt slept throughout the night with minimal interruptions.  Calls appropriately.  TLSO brace when OOB.   Reg thin diet, takes pills whole.  Continent x2, LBM 9/25.  BG checks 2x/day.  No skin issues, lines, or drains.  On I+O's.   Pt had complaints of right side/back pain, heat applied and robaxin given.  Appeared to be effective.    Goal Outcome Evaluation:

## 2024-09-28 NOTE — PLAN OF CARE
Goal Outcome Evaluation:      Plan of Care Reviewed With: patient    Overall Patient Progress: no changeOverall Patient Progress: no change    Outcome Evaluation: No change    Pt A&Ox4, 2/10 pain in back, transfers 1a with walker and gaitbelt, continent of bowel and bladder last BM 9/25, diest regular thin pills whole with water, skin has thoracic incision. BS checks BID, 121 this shift. Bed lowered and call light within reach. TLSO OOB

## 2024-09-28 NOTE — PLAN OF CARE
Pt attended Falls Prevention class today with group of 5 patients. Pt selected for class due to documented gait deficit and falls risk. Class includes education in falls risks, how to decrease that risk through behavior and home modifications and energy conservation; and instruction in available equipment designed to increase home safety. Pt was able to verbalize understanding of materials and participated appropriately in the discussion and problem-solving segments of the class.

## 2024-09-28 NOTE — PLAN OF CARE
Discharge Planner Post-Acute Rehab SLP:     Discharge Plan: home with wife and increased assist from family. No ongoing SLP anticipated. Recommend outpatient neuropsych to further evaluate cognitive decline over recent years.    Precautions: TLSO/spine    Current Status:  Hearing: WFL, tinnitus.  Vision: Distance glasses  Communication: Motor speech, language intact  Cognition: Mild cognitive impairment; attention, executive function, immediate and delayed recall  Swallow: Regular solids/thin liquids (0). Not evaluated by ARU SLP.    Assessment: Pt missed 5 minutes of session d/t nurse taking blood sugar upon SLP arrival. Pt agreeable to SYDNEY as Tx. Pt completed subtest 7 reading a calendar with 80% accuracy IND. Pt quick to respond and only had difficulty when tasked with holding too much information, mixing up the times for the appointments he was asked to add to the calander (i.e. Dr Romero, said 1:15 instead of 3:15). Pt completed subtest 8 reading instructions with 90% accuracy IND. Pt very quick to answer and recall information. No extra time or cues needed.     Other Barriers to Discharge (Family Training, etc): level of supervision/assist for iADLS?

## 2024-09-28 NOTE — PROGRESS NOTES
[facility], [place]   Physical Medicine and Rehabilitation Daily Note           Assessment and Plan of Care:   Judah Leon is a 84 year old right hand dominant male with a PMH of right cerebral hemorrhage, CKD stage 3, DM2 on insulin, HLD, malignant prostate cancer, and HTN who sustained a T8-9 minimally displaced fracture and L1 compression fracture from a fall on 9/5/24 s/p T7-11 posterior fusion on 9/9/24 with a slow recovery due to post-operative pain with no overt neurological damage.  He was admitted to acute inpatient rehab on  9/24/24.    --Vitals stable. Sugars remain stable  --Will schedule one time suppository this evening to help promote bowel movement.   -- Scheduled melatonin to help with sleep regulation.   --Continue ongoing medical management.  --Continue therapies and plan of care.             Interval history:   Notes and labs reviewed over past 24 hours. No acute overnight events reported    He was seen at bedside this AM. He reports that he had a poor night sleep. He had trouble falling and staying asleep. He also reports he is constipated and has not had a BM since 9/25. He denies any abdominal pain/discomfort. He would like to have a BM and we discussed suppository and he is in agreement with that tonight. We also discussed scheduling melatonin to help with sleep and he is also in agreement with that.     Denies fever, chills, CP, SOB, N/V, abdominal pain, new pain or weakness/numbness/tingling.             Physical Exam:     Vitals:    09/26/24 0754 09/26/24 1541 09/27/24 0758 09/27/24 1555   BP: 136/68 128/70 119/78 (!) 142/74   BP Location: Left arm Left arm Right arm Right arm   Pulse: 52 80 83 74   Resp: 18 18 17 18   Temp: 97.4  F (36.3  C) 97.7  F (36.5  C) 97.8  F (36.6  C) 97.4  F (36.3  C)   TempSrc: Oral Oral Oral Oral   SpO2: 99% 98% 99% 98%   Weight:       Height:         Gen: NAD, resting in bed   Heart: RRR  Lungs: clear breath sounds b/l  Abd:  soft and non-tender  Ext: wwp, no edema in BLE, no tenderness in calves  MSK/neuro: alert and oriented. speech fluent. moves BUE and BLE volitionally.          Data:   Scheduled meds  Current Facility-Administered Medications   Medication Dose Route Frequency Provider Last Rate Last Admin    acetaminophen (TYLENOL) tablet 975 mg  975 mg Oral TID David Strong MD   975 mg at 09/27/24 2027    amitriptyline (ELAVIL) tablet 10 mg  10 mg Oral QPM David Strong MD   10 mg at 09/27/24 2027    artificial tears ophthalmic ointment   Both Eyes At Bedtime David Strong MD   Given at 09/27/24 2027    famotidine (PEPCID) tablet 20 mg  20 mg Oral BID Ana Lilia Kong PA   20 mg at 09/27/24 2027    glipiZIDE (GLUCOTROL) half-tab 2.5 mg  2.5 mg Oral QAM AC Ana Lilia Kong PA   2.5 mg at 09/27/24 0638    magnesium oxide (MAG-OX) tablet 400 mg  400 mg Oral Daily Ana Lilia Kong PA   400 mg at 09/27/24 1004    metFORMIN (GLUCOPHAGE XR) 24 hr tablet 500 mg  500 mg Oral BID w/meals David Strong MD   500 mg at 09/27/24 1730    multivitamin w/minerals (THERA-VIT-M) tablet 1 tablet  1 tablet Oral Daily David Strong MD   1 tablet at 09/27/24 1004    polyethylene glycol (MIRALAX) Packet 17 g  17 g Oral Daily Ana Lilia Kong PA   17 g at 09/27/24 1439    senna-docusate (SENOKOT-S/PERICOLACE) 8.6-50 MG per tablet 2 tablet  2 tablet Oral At Bedtime Ana Lilia Kong PA   2 tablet at 09/27/24 2027    simvastatin (ZOCOR) tablet 40 mg  40 mg Oral QPM David Strong MD   40 mg at 09/27/24 2027    Vitamin D3 (CHOLECALCIFEROL) tablet 25 mcg  25 mcg Oral Daily David Strong MD   25 mcg at 09/27/24 1004       PRN meds:  Current Facility-Administered Medications   Medication Dose Route Frequency Provider Last Rate Last Admin    bisacodyl (DULCOLAX) suppository 10 mg  10 mg Rectal Daily PRN David Strong MD        calcium carbonate (TUMS) chewable tablet 1,000 mg  1,000 mg Oral TID PRN David Strong MD         carboxymethylcellulose PF (REFRESH PLUS) 0.5 % ophthalmic solution 2 drop  2 drop Both Eyes Q1H PRN David Strong MD        glucose gel 15-30 g  15-30 g Oral Q15 Min PRN David Strong MD        Or    dextrose 50 % injection 25-50 mL  25-50 mL Intravenous Q15 Min PRN David Strong MD        Or    glucagon injection 1 mg  1 mg Subcutaneous Q15 Min PRN David Strong MD        hydrOXYzine HCl (ATARAX) tablet 10 mg  10 mg Oral TID PRN David Strong MD        melatonin tablet 3 mg  3 mg Oral At Bedtime PRN David Strong MD        methocarbamol (ROBAXIN) tablet 500 mg  500 mg Oral BID PRN Ana Lilia Kong PA   500 mg at 09/28/24 0517    naloxone (NARCAN) injection 0.2 mg  0.2 mg Intravenous Q2 Min PRN Haim Ruby DO        Or    naloxone (NARCAN) injection 0.4 mg  0.4 mg Intravenous Q2 Min PRN Haim Ruby DO        Or    naloxone (NARCAN) injection 0.2 mg  0.2 mg Intramuscular Q2 Min PRN Haim Ruby DO        Or    naloxone (NARCAN) injection 0.4 mg  0.4 mg Intramuscular Q2 Min PRN Haim Ruby DO        Patient is already receiving anticoagulation with heparin, enoxaparin (LOVENOX), warfarin (COUMADIN)  or other anticoagulant medication   Does not apply Continuous PRN Ana Lilia Kong PA        polyethylene glycol (MIRALAX) Packet 17 g  17 g Oral BID PRN Ana Lilia Kong PA        QUEtiapine (SEROquel) half-tab 12.5 mg  12.5 mg Oral Q6H PRN Ana Lilia Kong PA   12.5 mg at 09/27/24 0034       Seen and discussed with Dr. Plascencia, PM&R staff physician       Stevan Mcmanus DO  PGY3  Physical Medicine and Rehabilitation-[facility]  Pager: [phone]

## 2024-09-29 ENCOUNTER — APPOINTMENT (OUTPATIENT)
Dept: SPEECH THERAPY | Facility: CLINIC | Age: 84
DRG: 561 | End: 2024-09-29
Attending: STUDENT IN AN ORGANIZED HEALTH CARE EDUCATION/TRAINING PROGRAM
Payer: COMMERCIAL

## 2024-09-29 ENCOUNTER — APPOINTMENT (OUTPATIENT)
Dept: OCCUPATIONAL THERAPY | Facility: CLINIC | Age: 84
DRG: 561 | End: 2024-09-29
Attending: STUDENT IN AN ORGANIZED HEALTH CARE EDUCATION/TRAINING PROGRAM
Payer: COMMERCIAL

## 2024-09-29 ENCOUNTER — APPOINTMENT (OUTPATIENT)
Dept: PHYSICAL THERAPY | Facility: CLINIC | Age: 84
DRG: 561 | End: 2024-09-29
Attending: STUDENT IN AN ORGANIZED HEALTH CARE EDUCATION/TRAINING PROGRAM
Payer: COMMERCIAL

## 2024-09-29 LAB
GLUCOSE BLDC GLUCOMTR-MCNC: 117 MG/DL (ref 70–99)
GLUCOSE BLDC GLUCOMTR-MCNC: 171 MG/DL (ref 70–99)

## 2024-09-29 PROCEDURE — 128N000003 HC R&B REHAB

## 2024-09-29 PROCEDURE — 250N000013 HC RX MED GY IP 250 OP 250 PS 637

## 2024-09-29 PROCEDURE — 97130 THER IVNTJ EA ADDL 15 MIN: CPT | Mod: GN | Performed by: SPEECH-LANGUAGE PATHOLOGIST

## 2024-09-29 PROCEDURE — 97530 THERAPEUTIC ACTIVITIES: CPT | Mod: GP | Performed by: PHYSICAL THERAPIST

## 2024-09-29 PROCEDURE — 92507 TX SP LANG VOICE COMM INDIV: CPT | Mod: GN | Performed by: SPEECH-LANGUAGE PATHOLOGIST

## 2024-09-29 PROCEDURE — 97535 SELF CARE MNGMENT TRAINING: CPT | Mod: GO

## 2024-09-29 PROCEDURE — 250N000013 HC RX MED GY IP 250 OP 250 PS 637: Performed by: STUDENT IN AN ORGANIZED HEALTH CARE EDUCATION/TRAINING PROGRAM

## 2024-09-29 PROCEDURE — 97116 GAIT TRAINING THERAPY: CPT | Mod: GP | Performed by: PHYSICAL THERAPIST

## 2024-09-29 PROCEDURE — 250N000013 HC RX MED GY IP 250 OP 250 PS 637: Performed by: PHYSICIAN ASSISTANT

## 2024-09-29 PROCEDURE — 97129 THER IVNTJ 1ST 15 MIN: CPT | Mod: GN | Performed by: SPEECH-LANGUAGE PATHOLOGIST

## 2024-09-29 RX ADMIN — MAGNESIUM OXIDE TAB 400 MG (241.3 MG ELEMENTAL MG) 400 MG: 400 (241.3 MG) TAB at 07:55

## 2024-09-29 RX ADMIN — AMITRIPTYLINE HYDROCHLORIDE 10 MG: 10 TABLET ORAL at 20:12

## 2024-09-29 RX ADMIN — Medication 25 MCG: at 07:55

## 2024-09-29 RX ADMIN — POLYETHYLENE GLYCOL 3350 17 G: 17 POWDER, FOR SOLUTION ORAL at 07:54

## 2024-09-29 RX ADMIN — Medication 3 MG: at 20:13

## 2024-09-29 RX ADMIN — Medication 1 TABLET: at 07:55

## 2024-09-29 RX ADMIN — FAMOTIDINE 20 MG: 20 TABLET ORAL at 07:55

## 2024-09-29 RX ADMIN — ACETAMINOPHEN 975 MG: 325 TABLET ORAL at 20:13

## 2024-09-29 RX ADMIN — Medication: at 20:15

## 2024-09-29 RX ADMIN — FAMOTIDINE 20 MG: 20 TABLET ORAL at 20:12

## 2024-09-29 RX ADMIN — ACETAMINOPHEN 975 MG: 325 TABLET ORAL at 07:54

## 2024-09-29 RX ADMIN — METFORMIN HYDROCHLORIDE 500 MG: 500 TABLET, EXTENDED RELEASE ORAL at 17:24

## 2024-09-29 RX ADMIN — ACETAMINOPHEN 975 MG: 325 TABLET ORAL at 13:19

## 2024-09-29 RX ADMIN — SIMVASTATIN 40 MG: 20 TABLET, FILM COATED ORAL at 20:13

## 2024-09-29 RX ADMIN — Medication 2.5 MG: at 07:55

## 2024-09-29 RX ADMIN — METFORMIN HYDROCHLORIDE 500 MG: 500 TABLET, EXTENDED RELEASE ORAL at 07:55

## 2024-09-29 RX ADMIN — SENNOSIDES AND DOCUSATE SODIUM 2 TABLET: 50; 8.6 TABLET ORAL at 20:13

## 2024-09-29 ASSESSMENT — ACTIVITIES OF DAILY LIVING (ADL)
ADLS_ACUITY_SCORE: 33

## 2024-09-29 NOTE — PLAN OF CARE
Occupational Therapy Discharge Summary    Reason for therapy discharge:    All goals and outcomes met, no further needs identified.    Progress towards therapy goal(s). See goals on Care Plan in [facility] electronic health record for goal details.  Goals met    Therapy recommendation(s):    Home OT for ultimate set up of home and carry over of current progress in the level of  I and safety. Use tub bench and hand held shower for bathing, reacher for dressing

## 2024-09-29 NOTE — PROGRESS NOTES
Speech Language Therapy Discharge Summary    Reason for therapy discharge:    Discharged to home.    Progress towards therapy goal(s). See goals on Care Plan in [facility] electronic health record for goal details.  Pt referred to ARU for cognitive evaluation due to confusion, delirium during hospitalization. Discovered pt with cognitive decline over recent years.    Therapy recommendation(s):    No further therapy is recommended.    Recommend outpatient neuropsych to further evaluate cognitive decline over recent years.     Status 09/29:  Hearing: WFL, tinnitus.  Vision: Distance glasses  Communication: Motor speech, language intact  Cognition: Mild cognitive impairment; attention, executive function, immediate and delayed recall  Swallow: Regular solids/thin liquids (0). Not evaluated by ARU SLP.    RBANS 09/25:  Repeatable Battery for the Assessment of Neuropsychological Status (RBANS) FORM A    Immediate Memory Visuospatial/  Constructional Language Attention Delayed Memory Total Scale   Index Score 78 84 86 64 64 68   Percentile Rank 7 14 18 1 1 2

## 2024-09-29 NOTE — PLAN OF CARE
"Goal Outcome Evaluation:      Plan of Care Reviewed With: patient    Overall Patient Progress: improvingOverall Patient Progress: improving    VS: /75 (BP Location: Right arm, Patient Position: Sitting, Cuff Size: Adult Regular)   Pulse 90   Temp 97.8  F (36.6  C) (Oral)   Resp 16   Ht 1.702 m (5' 7\")   Wt 74.4 kg (164 lb 1.6 oz)   SpO2 98%   BMI 25.70 kg/m       O2: SpO2 > 98% and stable on RA. LS clear and equal bilaterally. Denies chest pain and SOB.    Output: Voids spontaneously without difficulty to bathroom    Last BM: 9/28. denies abdominal discomfort. BS active / passing flatus.    Activity: Up with A1 walker and gait belt    Skin: WDL except incision at the back.    Pain: Pain managed with    CMS: Intact, AOx4.    Dressing: None    Diet: Regular diet. Denies nausea/vomiting.   BG checks BID.   LDA: Incision to the back open to air    Equipment: None    Plan: Continue with plan of care. Call light within reach, pt able to make needs known.    Additional Info:            "

## 2024-09-29 NOTE — PLAN OF CARE
Discharge Planner Post-Acute Rehab SLP:     Discharge Plan: home with wife and increased assist from family. No ongoing SLP.   Recommend outpatient neuropsych to further evaluate cognitive decline over recent years.    Precautions: TLSO/spine    Current Status:  Hearing: WFL, tinnitus.  Vision: Distance glasses  Communication: Motor speech, language intact  Cognition: Mild cognitive impairment; attention, executive function, immediate and delayed recall  Swallow: Regular solids/thin liquids (0). Not evaluated by ARU SLP.    Assessment: Pt seen with wife present, family caregiver education completed regarding recommend for wife to supervise/assist with iADLs, medication set up and ensure doses taken at correct time     Other Barriers to Discharge (Family Training, etc): level of supervision/assist for iADLS- completed with wife Yolette 09/29.

## 2024-09-29 NOTE — PLAN OF CARE
Goal Outcome Evaluation:      Plan of Care Reviewed With: patient    Overall Patient Progress: no changeOverall Patient Progress: no change    Outcome Evaluation: No change    Pt A&Ox4, no pain, transfers 1a with walker and gaitbelt, continent of bowel and bladder last BM 9/28, diet regular thin pills whole with water, skin has thoracic incision. BS checks BID, 108 this shift. Bed lowered and call light within reach. TLSO OOB

## 2024-09-29 NOTE — PLAN OF CARE
Physical Therapy Discharge Summary    Reason for therapy discharge:    Discharged to home with outpatient therapy.    Progress towards therapy goal(s). See goals on Care Plan in [facility] electronic health record for goal details.  Goals met    Therapy recommendation(s):    Continued therapy is recommended.  Rationale/Recommendations:  Pt will benefit from continued skilled PT intervention to improve balance and activity tolerance.  At baseline pt walking 2-3 miles on nature trails with walking stick(s).  Pt wished to return to hiking these trails, but needs continued progression of mobility requiring OP PT.

## 2024-09-29 NOTE — PLAN OF CARE
FOCUS/GOAL  Medication management, Mobility, and Safety management    ASSESSMENT, INTERVENTIONS AND CONTINUING PLAN FOR GOAL:  Pt transfer assist of one walker with gait belt, will self transfer without calling so alarms must be on at all times.  Alert and oriented x4, calm, pleasant.  Thoracic spinal fusion wound PEYTON.  TLSO brace when OOB.  Received a bed bath in the evening per report.  Continent x2, LBM 9/28.  Calls appropriately.  Goal Outcome Evaluation:

## 2024-09-30 VITALS
OXYGEN SATURATION: 99 % | RESPIRATION RATE: 16 BRPM | SYSTOLIC BLOOD PRESSURE: 134 MMHG | HEART RATE: 85 BPM | TEMPERATURE: 97.2 F | HEIGHT: 67 IN | BODY MASS INDEX: 25.75 KG/M2 | DIASTOLIC BLOOD PRESSURE: 77 MMHG | WEIGHT: 164.1 LBS

## 2024-09-30 LAB
ANION GAP SERPL CALCULATED.3IONS-SCNC: 11 MMOL/L (ref 7–15)
BASOPHILS # BLD AUTO: 0 10E3/UL (ref 0–0.2)
BASOPHILS NFR BLD AUTO: 1 %
BUN SERPL-MCNC: 47.4 MG/DL (ref 8–23)
CALCIUM SERPL-MCNC: 9.7 MG/DL (ref 8.8–10.4)
CHLORIDE SERPL-SCNC: 107 MMOL/L (ref 98–107)
CREAT SERPL-MCNC: 1.54 MG/DL (ref 0.67–1.17)
EGFRCR SERPLBLD CKD-EPI 2021: 44 ML/MIN/1.73M2
EOSINOPHIL # BLD AUTO: 0.1 10E3/UL (ref 0–0.7)
EOSINOPHIL NFR BLD AUTO: 1 %
ERYTHROCYTE [DISTWIDTH] IN BLOOD BY AUTOMATED COUNT: 13 % (ref 10–15)
GLUCOSE BLDC GLUCOMTR-MCNC: 156 MG/DL (ref 70–99)
GLUCOSE SERPL-MCNC: 148 MG/DL (ref 70–99)
HCO3 SERPL-SCNC: 20 MMOL/L (ref 22–29)
HCT VFR BLD AUTO: 31 % (ref 40–53)
HGB BLD-MCNC: 10.6 G/DL (ref 13.3–17.7)
IMM GRANULOCYTES # BLD: 0 10E3/UL
IMM GRANULOCYTES NFR BLD: 0 %
LYMPHOCYTES # BLD AUTO: 1.6 10E3/UL (ref 0.8–5.3)
LYMPHOCYTES NFR BLD AUTO: 30 %
MCH RBC QN AUTO: 34 PG (ref 26.5–33)
MCHC RBC AUTO-ENTMCNC: 34.2 G/DL (ref 31.5–36.5)
MCV RBC AUTO: 99 FL (ref 78–100)
MONOCYTES # BLD AUTO: 0.5 10E3/UL (ref 0–1.3)
MONOCYTES NFR BLD AUTO: 9 %
NEUTROPHILS # BLD AUTO: 3.1 10E3/UL (ref 1.6–8.3)
NEUTROPHILS NFR BLD AUTO: 59 %
NRBC # BLD AUTO: 0 10E3/UL
NRBC BLD AUTO-RTO: 0 /100
PLATELET # BLD AUTO: 154 10E3/UL (ref 150–450)
POTASSIUM SERPL-SCNC: 4.6 MMOL/L (ref 3.4–5.3)
RBC # BLD AUTO: 3.12 10E6/UL (ref 4.4–5.9)
SODIUM SERPL-SCNC: 138 MMOL/L (ref 135–145)
WBC # BLD AUTO: 5.3 10E3/UL (ref 4–11)

## 2024-09-30 PROCEDURE — 90480 ADMN SARSCOV2 VAC 1/ONLY CMP: CPT | Performed by: PHYSICIAN ASSISTANT

## 2024-09-30 PROCEDURE — 250N000013 HC RX MED GY IP 250 OP 250 PS 637: Performed by: PHYSICIAN ASSISTANT

## 2024-09-30 PROCEDURE — 36415 COLL VENOUS BLD VENIPUNCTURE: CPT

## 2024-09-30 PROCEDURE — XW023U6 INTRODUCTION OF COVID-19 VACCINE INTO MUSCLE, PERCUTANEOUS APPROACH, NEW TECHNOLOGY GROUP 6: ICD-10-PCS | Performed by: PHYSICIAN ASSISTANT

## 2024-09-30 PROCEDURE — 250N000013 HC RX MED GY IP 250 OP 250 PS 637

## 2024-09-30 PROCEDURE — 91320 SARSCV2 VAC 30MCG TRS-SUC IM: CPT | Performed by: PHYSICIAN ASSISTANT

## 2024-09-30 PROCEDURE — 80048 BASIC METABOLIC PNL TOTAL CA: CPT

## 2024-09-30 PROCEDURE — 99239 HOSP IP/OBS DSCHRG MGMT >30: CPT | Performed by: PHYSICIAN ASSISTANT

## 2024-09-30 PROCEDURE — 90662 IIV NO PRSV INCREASED AG IM: CPT | Performed by: PHYSICIAN ASSISTANT

## 2024-09-30 PROCEDURE — 250N000011 HC RX IP 250 OP 636: Performed by: PHYSICIAN ASSISTANT

## 2024-09-30 PROCEDURE — 85025 COMPLETE CBC W/AUTO DIFF WBC: CPT

## 2024-09-30 PROCEDURE — G0008 ADMIN INFLUENZA VIRUS VAC: HCPCS | Performed by: PHYSICIAN ASSISTANT

## 2024-09-30 RX ORDER — MAGNESIUM OXIDE 400 MG/1
400 TABLET ORAL DAILY
Qty: 30 TABLET | Refills: 0 | Status: SHIPPED | OUTPATIENT
Start: 2024-09-30

## 2024-09-30 RX ORDER — POLYETHYLENE GLYCOL 3350 17 G/17G
17 POWDER, FOR SOLUTION ORAL 2 TIMES DAILY PRN
COMMUNITY
Start: 2024-09-30

## 2024-09-30 RX ORDER — LANOLIN ALCOHOL/MO/W.PET/CERES
3 CREAM (GRAM) TOPICAL AT BEDTIME
Qty: 30 TABLET | Refills: 0 | Status: SHIPPED | OUTPATIENT
Start: 2024-09-30

## 2024-09-30 RX ORDER — DIPHENHYDRAMINE HYDROCHLORIDE 50 MG/ML
50 INJECTION INTRAMUSCULAR; INTRAVENOUS
Status: DISCONTINUED | OUTPATIENT
Start: 2024-09-30 | End: 2024-09-30 | Stop reason: HOSPADM

## 2024-09-30 RX ORDER — BISACODYL 10 MG
10 SUPPOSITORY, RECTAL RECTAL DAILY PRN
COMMUNITY
Start: 2024-09-30

## 2024-09-30 RX ORDER — METHOCARBAMOL 500 MG/1
500 TABLET, FILM COATED ORAL 2 TIMES DAILY PRN
Qty: 10 TABLET | Refills: 0 | Status: SHIPPED | OUTPATIENT
Start: 2024-09-30

## 2024-09-30 RX ORDER — DIPHENHYDRAMINE HCL 25 MG
50 CAPSULE ORAL
Status: DISCONTINUED | OUTPATIENT
Start: 2024-09-30 | End: 2024-09-30 | Stop reason: HOSPADM

## 2024-09-30 RX ORDER — AMOXICILLIN 250 MG
1 CAPSULE ORAL 2 TIMES DAILY
COMMUNITY
Start: 2024-09-30

## 2024-09-30 RX ORDER — CALCIUM CARBONATE 500 MG/1
1 TABLET, CHEWABLE ORAL 3 TIMES DAILY PRN
COMMUNITY
Start: 2024-09-30

## 2024-09-30 RX ADMIN — INFLUENZA A VIRUS A/VICTORIA/4897/2022 IVR-238 (H1N1) ANTIGEN (FORMALDEHYDE INACTIVATED), INFLUENZA A VIRUS A/CALIFORNIA/122/2022 SAN-022 (H3N2) ANTIGEN (FORMALDEHYDE INACTIVATED), AND INFLUENZA B VIRUS B/MICHIGAN/01/2021 ANTIGEN (FORMALDEHYDE INACTIVATED) 0.5 ML: 60; 60; 60 INJECTION, SUSPENSION INTRAMUSCULAR at 10:30

## 2024-09-30 RX ADMIN — COVID-19 VACCINE, MRNA 30 MCG: 0.04 INJECTION, SUSPENSION INTRAMUSCULAR at 10:32

## 2024-09-30 RX ADMIN — METFORMIN HYDROCHLORIDE 500 MG: 500 TABLET, EXTENDED RELEASE ORAL at 09:15

## 2024-09-30 RX ADMIN — Medication 2.5 MG: at 09:15

## 2024-09-30 RX ADMIN — POLYETHYLENE GLYCOL 3350 17 G: 17 POWDER, FOR SOLUTION ORAL at 09:13

## 2024-09-30 RX ADMIN — ACETAMINOPHEN 975 MG: 325 TABLET ORAL at 09:11

## 2024-09-30 RX ADMIN — Medication 1 TABLET: at 09:15

## 2024-09-30 RX ADMIN — MAGNESIUM OXIDE TAB 400 MG (241.3 MG ELEMENTAL MG) 400 MG: 400 (241.3 MG) TAB at 09:15

## 2024-09-30 RX ADMIN — Medication 25 MCG: at 09:15

## 2024-09-30 RX ADMIN — FAMOTIDINE 20 MG: 20 TABLET ORAL at 09:15

## 2024-09-30 ASSESSMENT — ACTIVITIES OF DAILY LIVING (ADL)
ADLS_ACUITY_SCORE: 33

## 2024-09-30 NOTE — PROGRESS NOTES
Neurosurgery Clinic Note    Chief Complaint: surgical follow-up     DATE OF VISIT: 10/3/2024    HPI: Judah Leon is a pleasant 84 year old male who is s/p Thoracic 7-11 Minimally Invasive Percutaneous Posterior instrumented fusion with Dr. Salas Guadalupe on 9/9/2024 after suffering a traumatic fall resulting in an unstable T8-9 fracture.  Following surgery the patient's hospitalization was complicated by delirium and hyperglycemia.  Endocrinology was consulted and management acute increase in blood glucose levels with insulin in the post-operative period. Endocrinology recommended patient continues his PTA diabetes regimen upon discharge to rehab.  Patient was discharged to rehab on 9/24/2024, he was discharged home on 9/30/2024.  He presents today for follow-up.     Currently, he states pain is controlled, currently not taking anything for pain.  States he is mobilizing with walker and tolerating well.   Denies weakness in the lower extremities.  No issues with bowel or bladder function.   Continues to wear brace when out of bed.  Patient denies any fevers, chills, wound drainage, or other signs of infection from the surgical wound.  Has not been checking his glucose levels regularly.  Has an appointment with his PCP today.         Review of Systems   Negative except in HPI    Past Medical History:   Diagnosis Date    Cerebrovascular disease 06/07/2003    Formatting of this note might be different from the original.   R sided bleed 1997      Chronic kidney disease, stage 3b (H) 09/24/2024    Chronic rhinitis 08/14/2006    Controlled type 2 diabetes mellitus with stage 3 chronic kidney disease, without long-term current use of insulin (H) 06/07/2003    Formatting of this note might be different from the original.   DM Type2      Hemorrhage of gastrointestinal tract 08/14/2006    Formatting of this note might be different from the original.   2002      Hyperlipidemia with target LDL less than 100 08/12/2011     Formatting of this note might be different from the original.   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   Added automatically from request for surgery [id number]      Osteoarthritis of lumbar spine 07/03/2014    Formatting of this note might be different from the original.   DJD (degenerative joint disease) of lumbar spine      Primary hypertension 09/01/2022    S/P fusion of thoracic spine 09/09/2024    Thoracic compression fracture (H) 09/05/2024       Past Surgical History:   Procedure Laterality Date    OPTICAL TRACKING SYSTEM FUSION SPINE POSTERIOR THORACIC PERCUTANEOUS THREE+ LEVELS N/A 9/9/2024    Procedure: o-arm/stealth assisted Thoracic 7-11 Minimally Invasive Percutaneous Posterior instrumented fusion with Bone Morphogenic Protein and Allograft;  Surgeon: Salas Guadalupe MD;  Location:  OR       Social History     Socioeconomic History    Marital status:    Tobacco Use    Smoking status: Never    Smokeless tobacco: Never   Substance and Sexual Activity    Alcohol use: Yes     Alcohol/week: 2.0 standard drinks of alcohol     Types: 2 Cans of beer per week    Drug use: Never     Social Determinants of Health     Financial Resource Strain: Low Risk  (9/6/2024)    Financial Resource Strain     Within the past 12 months, have you or your family members you live with been unable to get utilities (heat, electricity) when it was really needed?: No   Food Insecurity: Low Risk  (9/6/2024)    Food Insecurity     Within the past 12 months, did you worry that your food would run out before you got money to buy more?: No     Within the past 12 months, did the food you bought just not last and you didn t have money to get more?: No   Transportation Needs: Low Risk  (9/6/2024)    Transportation Needs     Within the past 12 months, has lack of transportation kept you from  medical appointments, getting your medicines, non-medical meetings or appointments, work, or from getting things that you need?: No    Received from [employer] [facility]    Social Connections   Interpersonal Safety: Low Risk  (9/24/2024)    Interpersonal Safety     Do you feel physically and emotionally safe where you currently live?: Yes     Within the past 12 months, have you been hit, slapped, kicked or otherwise physically hurt by someone?: No     Within the past 12 months, have you been humiliated or emotionally abused in other ways by your partner or ex-partner?: No   Housing Stability: Low Risk  (9/6/2024)    Housing Stability     Do you have housing? : Yes     Are you worried about losing your housing?: No       family history is not on file.              Physical Exam   There were no vitals taken for this visit.  Constitutional: Oriented to person, place, and time. Appears well-developed and well-nourished. Cooperative. No distress.   HENT: Head normocephalic and atraumatic.     Incision:  Clean, dry and intact.  No erythema, induration or fluctuance.  No drainage noted.  Photo of wound placed in media tab for review  Neurological: alert and oriented to person, place, and time. CN II-XII grossly  intact      STRENGTH LEFT RIGHT   Deltoid 5 5   Bicep 5 5   Wrist Extensor 5 5   Tricep 5 5   Finger flexion 5 5   Finger abduction 5 5    5 5       Hip Flexion     5     5   Knee Extension 5 5   Ankle Dorsiflexion 5 5   Extensor Hallucis Longus 5 5   Plantar Flexion 5 5   Foot eversion 5 5   Foot inversion 5 5       Skin: Skin is warm, dry and intact.   Psychiatric: Normal mood and affect. Speech is normal and behavior is normal.      IMAGING:  No imaging available for review today     ASSESSMENT:  Judah Leon is a 84 year old male s/p Thoracic 7-11 Minimally Invasive Percutaneous Posterior instrumented fusion with Dr. Salas Guadalupe on 9/9/2024    PLAN:  Do not do any bending, twisting,  strenuous exercise, or heavy lifting (greater than 10 pounds). Be careful and ask for assistance when walking or going up and down stairs. Avoid any activities that could result in trauma to the surgical wound. Do not drive while using narcotics or other sedating medications, such as sleep aids, muscle relaxants, etc.    Ok to shower, however do not submerge wound in water (no bath tubs, hot tubs, pools, lakes, rivers).     Please refrain from using tobacco products of any form.   Do not use alcohol or other sedating substances while taking prescription pain medications.      Please do not take any NSAIDs (advil, aleve, motrin, ibuprofen, celebrex, etc) as they inhibit bony fusion.    Please continue to wear brace as directed until discontinued by Neurosurgery provider.      If you should sustain new trauma or injury or note increased headache, weakness, speech or vision issues, confusion, or other neurologic changes please call 911 or present to your nearest emergency room for further evaluation.     Please follow-up with 4 weeks with repeat x-rays    Please follow-up with PCP for glucose management, please check your blood glucose levels daily at minimum.                I spent 25 minutes in patient care with greater than 50% spent in counseling and/or coordination of care.     I performed independent visualization of radiographic imaging and entered my own interpretation, reviewed and/or ordered tests in radiology        ASH Muñoz, CNP  Department of Neurosurgery  Pager: [phone]

## 2024-09-30 NOTE — PROGRESS NOTES
"Pt discharging home today, 9/30, with OP therapies set up. SW met with pt and his family prior to discharge to complete IRF and IMM. Pt had no further questions for SW.    IRF-JIMMY Pain Assessment    Pain Effect on Sleep  \"Over the past 5 days, how much of the time has pain made it hard for you to sleep at night?\"    0. Does not apply - I have not had any pain or hurting in the past 5 days    NANI Morris  Post Acute Float   [facility]U/[facility]    Phone: [phone]  Fax: [phone]    "

## 2024-09-30 NOTE — DISCHARGE SUMMARY
[facility]   Acute Rehabilitation Unit  Discharge summary     Date of Admission: 9/24/2024  Date of Discharge: 9/30/24  Disposition: home  Primary Care Physician: Clinic, [facility]  Attending physician: Todd Abraham MD      DISCHARGE DIAGNOSIS  S/p Posterior Thoracic fusion  Delirium  HLD  DM type 2  CKD stage 3 B      BRIEF SUMMARY  Judah Leon is a 84 year old right hand dominant male with a PMH of right cerebral hemorrhage, CKD stage 3, DM2 on insulin, HLD, malignant prostate cancer, and HTN who sustained a T8-9 minimally displaced fracture and L1 compression fracture from a fall on 9/5/24 s/p T7-11 posterior fusion on 9/9/24 with a slow recovery due to post-operative pain with no overt neurological damage.  He was admitted to acute inpatient rehab on  9/24/24.      REHABILITATION COURSE  Physical Therapy Discharge Summary     Reason for therapy discharge:    Discharged to home with outpatient therapy.     Progress towards therapy goal(s). See goals on Care Plan in [employer] electronic health record for goal details.  Goals met     Therapy recommendation(s):    Continued therapy is recommended.  Rationale/Recommendations:  Pt will benefit from continued skilled PT intervention to improve balance and activity tolerance.  At baseline pt walking 2-3 miles on nature trails with walking stick(s).  Pt wished to return to hiking these trails, but needs continued progression of mobility requiring OP PT.  Speech Language Therapy Discharge Summary     Reason for therapy discharge:    Discharged to home.     Progress towards therapy goal(s). See goals on Care Plan in [employer] electronic health record for goal details.  Pt referred to ARU for cognitive evaluation due to confusion, delirium during hospitalization. Discovered pt with cognitive decline over recent years.     Therapy recommendation(s):    No further therapy is recommended.     Recommend outpatient  neuropsych to further evaluate cognitive decline over recent years.      Status 09/29:  Hearing: WFL, tinnitus.  Vision: Distance glasses  Communication: Motor speech, language intact  Cognition: Mild cognitive impairment; attention, executive function, immediate and delayed recall  Swallow: Regular solids/thin liquids (0). Not evaluated by ARU SLP.     RBANS 09/25:  Repeatable Battery for the Assessment of Neuropsychological Status (RBANS) FORM A    Immediate Memory Visuospatial/  Constructional Language Attention Delayed Memory Total Scale   Index Score 78 84 86 64 64 68   Percentile Rank 7 14 18 1 1 2        Occupational Therapy Discharge Summary     Reason for therapy discharge:    All goals and outcomes met, no further needs identified.     Progress towards therapy goal(s). See goals on Care Plan in [facility] electronic health record for goal details.  Goals met     Therapy recommendation(s):    Home OT for ultimate set up of home and carry over of current progress in the level of  I and safety. Use tub bench and hand held shower for bathing, reacher for dressing    MEDICAL COURSE  #S/P Posterior thoracic fusion  T7-11 fusion performed 9/9/24.  Staples removed.  - leave wound open to air said per surgery, ok to cover and change dressing daily prn  No showering or submerging.    - precautions, thoracic lumbar spine.  TLSO when out of bed.  No bending or twisting back when repositioning  - precautions: no bending, twisting, or lifting > 10lbs.    -follow up neurosurgery as scheduled  - acetaminophen PRN  - methocarbamol 500mg BID PRN spasms,   - PT, OT, with outpatient therapy     #Head injury during fall  #delirium  With 18 score on SLUMS and endorsement of confusion along with remote history of right head bleed, right SDH is on the differential.  However, no focal neurological signs.  - low threshold for head imaging if altered mental status or new focal neurological signs  -delirium precautions  -seen by SLP  recommended outpatient neuropsych evaluation: Discharge Plan: home with wife and increased assist from family. No ongoing SLP.   Per speech assessment: Cognition: Mild cognitive impairment; attention, executive function, immediate and delayed recall  - tapered off gabapentin, discontinued oxycodone, during rehab stay     #HLD  - simvastatin 40mg every evening     #DM2  History of hyperglycemia during hospitalization.  Last A1c 8.2%.  -received diet education from nutrition  -glipizide 2.5 mg daily  - metformin 500mg BID with meals  -encouraged to monitor and record glucose consider further titration of oral medications pending glucose readings and renal function.      #CKD 3 b: Baseline 1.4-1.6 Monitor with BMP, Cr 1.54 9/30/24  -follow up nephrology(routine)     #Vitamin D deficiency  - vitamin D3 1000 units daily       #Depression  - amitriptyline 10mg every evening     #Sleep  - melatonin 3mg bedtime PRN        #Dry eyes: artificial tears, carboxymethylcellulose PF 0.5% 2 drop both eyes q1h prn    DISCHARGE MEDICATIONS  Current Discharge Medication List        START taking these medications    Details   artificial tears OINT ophthalmic ointment Place 1 g into both eyes at bedtime.    Associated Diagnoses: Nuclear sclerosis of left eye      bisacodyl (DULCOLAX) 10 MG suppository Place 1 suppository (10 mg) rectally daily as needed for constipation.    Associated Diagnoses: Constipation, unspecified constipation type      calcium carbonate (TUMS) 500 MG chewable tablet Take 1 tablet (500 mg) by mouth 3 times daily as needed for heartburn.    Associated Diagnoses: Chronic kidney disease, stage 3b (H)      magnesium oxide (MAG-OX) 400 MG tablet Take 1 tablet (400 mg) by mouth daily.  Qty: 30 tablet, Refills: 0    Associated Diagnoses: Constipation, unspecified constipation type      melatonin 3 MG tablet Take 1 tablet (3 mg) by mouth at bedtime.  Qty: 30 tablet, Refills: 0    Associated Diagnoses: Displaced fracture  of unspecified tibial spine, sequela           CONTINUE these medications which have CHANGED    Details   methocarbamol (ROBAXIN) 500 MG tablet Take 1 tablet (500 mg) by mouth 2 times daily as needed for muscle spasms.  Qty: 10 tablet, Refills: 0    Associated Diagnoses: S/P fusion of thoracic spine      polyethylene glycol (MIRALAX) 17 GM/Dose powder Take 17 g by mouth 2 times daily as needed for constipation.    Associated Diagnoses: S/P fusion of thoracic spine      senna-docusate (SENOKOT-S/PERICOLACE) 8.6-50 MG tablet Take 1 tablet by mouth 2 times daily.    Associated Diagnoses: S/P fusion of thoracic spine           CONTINUE these medications which have NOT CHANGED    Details   amitriptyline (ELAVIL) 10 MG tablet Take 1 tablet (10 mg) by mouth every evening.    Comments: depression      carboxymethylcellulose PF (REFRESH PLUS) 0.5 % ophthalmic solution Place 1 drop into both eyes every hour as needed for dry eyes.    Comments: Dry eyes      glipiZIDE 2.5 MG TABS Take 2.5 mg by mouth daily (with breakfast).    Comments: Diabetes      metFORMIN (GLUCOPHAGE XR) 500 MG 24 hr tablet Take 1 tablet (500 mg) by mouth 2 times daily (with meals).    Comments: Diabetes      multivitamin w/minerals (THERA-VIT-M) tablet Take 1 tablet by mouth daily.    Comments: supplement      simvastatin (ZOCOR) 40 MG tablet Take 1 tablet (40 mg) by mouth every evening.    Comments: HLD      Vitamin D3 (CHOLECALCIFEROL) 25 mcg (1000 units) tablet Take 1 tablet (25 mcg) by mouth daily.    Comments: supplement      acetaminophen (TYLENOL) 325 MG tablet Take 2 tablets (650 mg) by mouth every 4 hours as needed for other (For optimal non-opioid multimodal pain management to improve pain control.).    Comments: Pain  Associated Diagnoses: S/P fusion of thoracic spine           STOP taking these medications       gabapentin (NEURONTIN) 400 MG capsule Comments:   Reason for Stopping:         insulin aspart (NOVOLOG PEN) 100 UNIT/ML pen  Comments:   Reason for Stopping:         insulin glargine (LANTUS PEN) 100 UNIT/ML pen Comments:   Reason for Stopping:         oxyCODONE (ROXICODONE) 5 MG tablet Comments:   Reason for Stopping:                 DISCHARGE INSTRUCTIONS AND FOLLOW UP  Discharge Procedure Orders   Physical Therapy  Referral   Standing Status: Future   Referral Priority: Routine Referral Type: Rehab Therapy Physical Therapy   Number of Visits Requested: 1     Occupational Therapy  Referral   Standing Status: Future   Referral Priority: Routine Referral Type: Occupational Therapy   Number of Visits Requested: 1     Reason for your hospital stay   Order Comments: Admitted for rehabilitation following thoracic fusion.     Activity   Order Comments: Your activity upon discharge: activity as tolerated  No bending/twisting, no lifting > 10 lbs.  Continue to wear brace when out of bed.  Continue therapy as advised.     Order Specific Question Answer Comments   Is discharge order? Yes      Adult [facility] Follow-up and recommended labs and tests   Order Comments: Follow up with primary care provider, [facility], within 7 days for hospital follow- up.    Follow up thoracic fusion with neurosurgery as scheduled  Follow up nephrology- as scheduled  Follow up neuropsychology- evaluation of cognitive function    Appointments on [place] and/or [facility] (with [facility] or [facility] provider or service). Call [phone] if you haven't heard regarding these appointments within 7 days of discharge.     Monitor and record   Order Comments: Blood glucose: twice daily.     Full Code     Order Specific Question Answer Comments   Code status determined by: Discussion with patient/ legal decision maker      Diet   Order Comments: Follow this diet upon discharge:       Combination Diet Regular Diet; Thin Liquids (level 0)     Order Specific Question Answer Comments   Is discharge order? Yes           PHYSICAL  EXAMINATION    Most recent Vital Signs:   Vitals:    09/28/24 0916 09/28/24 1637 09/29/24 0753 09/29/24 1516   BP:  (!) 140/74 125/75 127/68   BP Location:  Right arm Right arm Right arm   Patient Position:   Sitting    Cuff Size:   Adult Regular    Pulse:  97 90 77   Resp: 16 16 16 16   Temp: 97.9  F (36.6  C) 97.5  F (36.4  C) 97.8  F (36.6  C) 97.6  F (36.4  C)   TempSrc: Oral Oral Oral Oral   SpO2:  95% 98% 98%   Weight:       Height:       General: awake alert nad  HEENT: mmm eomi  Resp: non labored clear  CV: rrr  Ab: soft non distended non tender  Extremities: warm dry without edema  MSK/neuro: alert speech clear moves all extremities against gravity with resistance  Skin: posterior thoracic incision cdi, skin tear distal to incision cdi with minimal redness without drainage.       45 minutes spent in discharge, including >50% in counseling and coordination of care, medication review and plan of care recommended on follow up.     Discharge summary was forwarded to Clinic, [facility] (PCP) at the time of discharge, so as to bridge from hospital to outpatient care.     It was our pleasure to care for Judah Leon during this hospitalization. Please do not hesitate to contact me should there be questions regarding the hospital course or discharge plan.          Ana Liila Kong PA-C  Physical Medicine and Rehabilitation

## 2024-09-30 NOTE — PLAN OF CARE
"Goal Outcome Evaluation:      Plan of Care Reviewed With: patient    Overall Patient Progress: improvingOverall Patient Progress: improving      VS: /72 (BP Location: Right arm)   Pulse 87   Temp 97.2  F (36.2  C) (Oral)   Resp 18   Ht 1.702 m (5' 7\")   Wt 74.4 kg (164 lb 1.6 oz)   SpO2 99%   BMI 25.70 kg/m          O2: SpO2 > 99 and stable on RA. LS clear and equal bilaterally. Denies chest pain and SOB.    Output: Voids spontaneously without difficulty to bathroom    Last BM: 9/30. denies abdominal discomfort. BS active / passing flatus.    Activity: Up with A1 walker and gait belt    Skin: WDL except incision at the back.    Pain: Pain managed with    CMS: Intact, AOx4.    Dressing: None    Diet: Regular diet. Denies nausea/vomiting.   BG checks BID.   LDA: Incision to the back open to air    Equipment: None    Plan: Continue with plan of care. Call light within reach, pt able to make needs known.    Additional Info:  Pt discharged home today accompanied by family. AVS/MEDS given to pt. Able to get to the cart with minimal assist with no difficulties.                 "

## 2024-09-30 NOTE — PLAN OF CARE
FOCUS/GOAL  Medical management, Discharge planning, and Prevention of secondary complications    ASSESSMENT, INTERVENTIONS AND CONTINUING PLAN FOR GOAL:    Pt discharges today. Denied pain, alert and oriented x4.  Pleasant.  Continent x2, LBM 9/28.  No concerns at this time, continuing with plan of care.  Transfers contact guard assist with walker.      Goal Outcome Evaluation:

## 2024-10-01 ENCOUNTER — TELEPHONE (OUTPATIENT)
Dept: NEUROPSYCHOLOGY | Facility: CLINIC | Age: 84
End: 2024-10-01
Payer: COMMERCIAL

## 2024-10-01 NOTE — TELEPHONE ENCOUNTER
[facility] Call Center    Phone Message    May a detailed message be left on voicemail: yes     Reason for Call: Appointment Intake    Referring Provider Name:     Ana Lilia Kong PA     Diagnosis and/or Symptoms: Cerebrovascular disease  eval given progressive cognitive decline    Action Taken: Other: Routed to [facility] Neuropsychology Adult [facility]    Travel Screening: Not Applicable     Please review and contact patient for scheduling, cerebrovascular disease is not on scheduling guidelines and cognitive decline requires review

## 2024-10-02 ENCOUNTER — PATIENT OUTREACH (OUTPATIENT)
Dept: CARE COORDINATION | Facility: CLINIC | Age: 84
End: 2024-10-02
Payer: COMMERCIAL

## 2024-10-02 NOTE — CONFIDENTIAL NOTE
Schedule per protocol    Timeline: First Available  Location: ANY LOCATION  Preferred Provider: ANY PROVIDER  If Preferred Provider is not available: ANY PROVIDER  Visit Type: NEUROPSYCH EVAL or Neuropsych Interview Only (testing scheduled shortly after interview)  Special Instructions:    Anayeli Guzman   Psychometrist

## 2024-10-02 NOTE — PROGRESS NOTES
Clinic Care Coordination Contact  [facility]/Voicemail    Clinical Data: Care Coordinator Outreach    Outreach Documentation Number of Outreach Attempt   10/2/2024   9:44 AM 2       Left message on patient's voicemail with call back information and requested return call.    Care Coordinator will do no further outreaches at this time.    Fern Baca  [phone]  Care

## 2024-10-03 ENCOUNTER — OFFICE VISIT (OUTPATIENT)
Dept: NEUROSURGERY | Facility: CLINIC | Age: 84
End: 2024-10-03
Payer: COMMERCIAL

## 2024-10-03 ENCOUNTER — TELEPHONE (OUTPATIENT)
Dept: NEUROSURGERY | Facility: CLINIC | Age: 84
End: 2024-10-03

## 2024-10-03 VITALS
DIASTOLIC BLOOD PRESSURE: 75 MMHG | OXYGEN SATURATION: 97 % | WEIGHT: 164 LBS | SYSTOLIC BLOOD PRESSURE: 136 MMHG | HEART RATE: 80 BPM | BODY MASS INDEX: 25.69 KG/M2

## 2024-10-03 DIAGNOSIS — Z98.1 S/P FUSION OF THORACIC SPINE: Primary | ICD-10-CM

## 2024-10-03 PROCEDURE — 99024 POSTOP FOLLOW-UP VISIT: CPT | Performed by: NURSE PRACTITIONER

## 2024-10-03 NOTE — LETTER
10/3/2024       RE: Judah Leon  [address] [place] [address]     Dear Colleague,    Thank you for referring your patient, Judah Leon, to the [facility]. Please see a copy of my visit note below.        Neurosurgery Clinic Note    Chief Complaint: surgical follow-up     DATE OF VISIT: 10/3/2024    HPI: Judah Leon is a pleasant 84 year old male who is s/p Thoracic 7-11 Minimally Invasive Percutaneous Posterior instrumented fusion with Dr. Salas Guadalupe on 9/9/2024 after suffering a traumatic fall resulting in an unstable T8-9 fracture.  Following surgery the patient's hospitalization was complicated by delirium and hyperglycemia.  Endocrinology was consulted and management acute increase in blood glucose levels with insulin in the post-operative period. Endocrinology recommended patient continues his PTA diabetes regimen upon discharge to rehab.  Patient was discharged to rehab on 9/24/2024, he was discharged home on 9/30/2024.  He presents today for follow-up.     Currently, he states pain is controlled, currently not taking anything for pain.  States he is mobilizing with walker and tolerating well.   Denies weakness in the lower extremities.  No issues with bowel or bladder function.   Continues to wear brace when out of bed.  Patient denies any fevers, chills, wound drainage, or other signs of infection from the surgical wound.  Has not been checking his glucose levels regularly.  Has an appointment with his PCP today.         Review of Systems   Negative except in HPI    Past Medical History:   Diagnosis Date     Cerebrovascular disease 06/07/2003    Formatting of this note might be different from the original.   R sided bleed 1997       Chronic kidney disease, stage 3b (H) 09/24/2024     Chronic rhinitis 08/14/2006     Controlled type 2 diabetes mellitus with stage 3 chronic kidney disease, without  long-term current use of insulin (H) 06/07/2003    Formatting of this note might be different from the original.   DM Type2       Hemorrhage of gastrointestinal tract 08/14/2006    Formatting of this note might be different from the original.   2002       Hyperlipidemia with target LDL less than 100 08/12/2011    Formatting of this note might be different from the original.   Hyperlipidemia LDL goal < 100       Malignant neoplasm of prostate (H) 08/12/2011     Nuclear sclerosis of left eye 10/21/2021    Formatting of this note might be different from the original.   Added automatically from request for surgery [id number]       Osteoarthritis of lumbar spine 07/03/2014    Formatting of this note might be different from the original.   DJD (degenerative joint disease) of lumbar spine       Primary hypertension 09/01/2022     S/P fusion of thoracic spine 09/09/2024     Thoracic compression fracture (H) 09/05/2024       Past Surgical History:   Procedure Laterality Date     OPTICAL TRACKING SYSTEM FUSION SPINE POSTERIOR THORACIC PERCUTANEOUS THREE+ LEVELS N/A 9/9/2024    Procedure: o-arm/stealth assisted Thoracic 7-11 Minimally Invasive Percutaneous Posterior instrumented fusion with Bone Morphogenic Protein and Allograft;  Surgeon: Salas Guadalupe MD;  Location:  OR       Social History     Socioeconomic History     Marital status:    Tobacco Use     Smoking status: Never     Smokeless tobacco: Never   Substance and Sexual Activity     Alcohol use: Yes     Alcohol/week: 2.0 standard drinks of alcohol     Types: 2 Cans of beer per week     Drug use: Never     Social Determinants of Health     Financial Resource Strain: Low Risk  (9/6/2024)    Financial Resource Strain      Within the past 12 months, have you or your family members you live with been unable to get utilities (heat, electricity) when it was really needed?: No   Food Insecurity: Low Risk  (9/6/2024)    Food Insecurity      Within the past 12 months, did  you worry that your food would run out before you got money to buy more?: No      Within the past 12 months, did the food you bought just not last and you didn t have money to get more?: No   Transportation Needs: Low Risk  (9/6/2024)    Transportation Needs      Within the past 12 months, has lack of transportation kept you from medical appointments, getting your medicines, non-medical meetings or appointments, work, or from getting things that you need?: No    Received from [facility]    Social Connections   Interpersonal Safety: Low Risk  (9/24/2024)    Interpersonal Safety      Do you feel physically and emotionally safe where you currently live?: Yes      Within the past 12 months, have you been hit, slapped, kicked or otherwise physically hurt by someone?: No      Within the past 12 months, have you been humiliated or emotionally abused in other ways by your partner or ex-partner?: No   Housing Stability: Low Risk  (9/6/2024)    Housing Stability      Do you have housing? : Yes      Are you worried about losing your housing?: No       family history is not on file.              Physical Exam   There were no vitals taken for this visit.  Constitutional: Oriented to person, place, and time. Appears well-developed and well-nourished. Cooperative. No distress.   HENT: Head normocephalic and atraumatic.     Incision:  Clean, dry and intact.  No erythema, induration or fluctuance.  No drainage noted.  Photo of wound placed in media tab for review  Neurological: alert and oriented to person, place, and time. CN II-XII grossly  intact      STRENGTH LEFT RIGHT   Deltoid 5 5   Bicep 5 5   Wrist Extensor 5 5   Tricep 5 5   Finger flexion 5 5   Finger abduction 5 5    5 5       Hip Flexion     5     5   Knee Extension 5 5   Ankle Dorsiflexion 5 5   Extensor Hallucis Longus 5 5   Plantar Flexion 5 5   Foot eversion 5 5   Foot inversion 5 5       Skin: Skin is warm, dry and intact.    Psychiatric: Normal mood and affect. Speech is normal and behavior is normal.      IMAGING:  No imaging available for review today     ASSESSMENT:  Judah Leon is a 84 year old male s/p Thoracic 7-11 Minimally Invasive Percutaneous Posterior instrumented fusion with Dr. Salas Guadalupe on 9/9/2024    PLAN:  Do not do any bending, twisting, strenuous exercise, or heavy lifting (greater than 10 pounds). Be careful and ask for assistance when walking or going up and down stairs. Avoid any activities that could result in trauma to the surgical wound. Do not drive while using narcotics or other sedating medications, such as sleep aids, muscle relaxants, etc.    Ok to shower, however do not submerge wound in water (no bath tubs, hot tubs, pools, lakes, rivers).     Please refrain from using tobacco products of any form.   Do not use alcohol or other sedating substances while taking prescription pain medications.      Please do not take any NSAIDs (advil, aleve, motrin, ibuprofen, celebrex, etc) as they inhibit bony fusion.    Please continue to wear brace as directed until discontinued by Neurosurgery provider.      If you should sustain new trauma or injury or note increased headache, weakness, speech or vision issues, confusion, or other neurologic changes please call 911 or present to your nearest emergency room for further evaluation.     Please follow-up with 4 weeks with repeat x-rays    Please follow-up with PCP for glucose management, please check your blood glucose levels daily at minimum.                I spent 25 minutes in patient care with greater than 50% spent in counseling and/or coordination of care.     I performed independent visualization of radiographic imaging and entered my own interpretation, reviewed and/or ordered tests in radiology        ASH Muñoz, CNP  Department of Neurosurgery  Pager: [phone]      Again, thank you for allowing me to participate in the care of your patient.       Sincerely,    ASH Sahu CNP

## 2024-10-03 NOTE — TELEPHONE ENCOUNTER
Left Voicemail (1st Attempt) for the patient to call back and schedule the following:    Appointment type: post op neurosg  Provider: Ivonne Mccullough  Return date: Around 10/31/24  Specialty phone number: [phone]  Additional appointment(s) needed: X-Rays prior  Additonal Notes:  4 week f/u

## 2024-10-03 NOTE — TELEPHONE ENCOUNTER
Pt scheduled for first available and added to waitlist.    [facility] phone number provided if pt would like to inquire about possibly getting in sooner there.    Angelina Harris on 10/3/2024 at 9:28 AM

## 2024-10-03 NOTE — PATIENT INSTRUCTIONS
Do not do any bending, twisting, strenuous exercise, or heavy lifting (greater than 10 pounds). Be careful and ask for assistance when walking or going up and down stairs. Avoid any activities that could result in trauma to the surgical wound. Do not drive while using narcotics or other sedating medications, such as sleep aids, muscle relaxants, etc.    Ok to shower, however do not submerge wound in water (no bath tubs, hot tubs, pools, lakes, rivers).     Please refrain from using tobacco products of any form.   Do not use alcohol or other sedating substances while taking prescription pain medications.      Please do not take any NSAIDs (advil, aleve, motrin, ibuprofen, celebrex, etc) as they inhibit bony fusion.    Please continue to wear brace as directed until discontinued by Neurosurgery provider.      If you should sustain new trauma or injury or note increased headache, weakness, speech or vision issues, confusion, or other neurologic changes please call 911 or present to your nearest emergency room for further evaluation.     Please follow-up with 4 weeks with repeat x-rays

## 2024-10-08 ENCOUNTER — TELEPHONE (OUTPATIENT)
Dept: NEUROSURGERY | Facility: CLINIC | Age: 84
End: 2024-10-08
Payer: COMMERCIAL

## 2024-11-04 NOTE — PROGRESS NOTES
Neurosurgery Clinic Note     Chief Complaint: surgical follow-up      DATE OF VISIT: 11/7/2024      HPI: Judah Leon is a pleasant 84 year old male who is s/p Thoracic 7-11 Minimally Invasive Percutaneous Posterior instrumented fusion with Dr. Salas Guadalupe on 9/9/2024 after suffering a traumatic fall resulting in an unstable T8-9 fracture.  Following surgery the patient's hospitalization was complicated by delirium and hyperglycemia.  Endocrinology was consulted and management acute increase in blood glucose levels with insulin in the post-operative period. Endocrinology recommended patient continues his PTA diabetes regimen upon discharge to rehab.  Patient was discharged to rehab on 9/24/2024, he was discharged home on 9/30/2024.  He was last seen on 10/3/2024 and presents today for follow-up.      Currently, he states pain is controlled, currently not requiring anything for pain.  He does note pain on the left just above the belt line when he gets out of bed in the morning, states this resolves after walking.  Denies weakness in the lower extremities.  No issues with bowel or bladder function.   Continues to wear brace when out of bed.  States he tolerating ADLS and activity without issue.  Patient denies any fevers, chills, wound drainage, or other signs of infection from the surgical wound.               Review of Systems   Negative except in HPI     Past Medical History        Past Medical History:   Diagnosis Date    Cerebrovascular disease 06/07/2003     Formatting of this note might be different from the original.   R sided bleed 1997      Chronic kidney disease, stage 3b (H) 09/24/2024    Chronic rhinitis 08/14/2006    Controlled type 2 diabetes mellitus with stage 3 chronic kidney disease, without long-term current use of insulin (H) 06/07/2003     Formatting of this note might be different from the original.   DM Type2      Hemorrhage of gastrointestinal tract 08/14/2006     Formatting of this note  might be different from the original.   2002      Hyperlipidemia with target LDL less than 100 08/12/2011     Formatting of this note might be different from the original.   Hyperlipidemia LDL goal < 100      Malignant neoplasm of prostate (H) 08/12/2011    Nuclear sclerosis of left eye 10/21/2021     Formatting of this note might be different from the original.   Added automatically from request for surgery [id number]      Osteoarthritis of lumbar spine 07/03/2014     Formatting of this note might be different from the original.   DJD (degenerative joint disease) of lumbar spine      Primary hypertension 09/01/2022    S/P fusion of thoracic spine 09/09/2024    Thoracic compression fracture (H) 09/05/2024            Past Surgical History         Past Surgical History:   Procedure Laterality Date    OPTICAL TRACKING SYSTEM FUSION SPINE POSTERIOR THORACIC PERCUTANEOUS THREE+ LEVELS N/A 9/9/2024     Procedure: o-arm/stealth assisted Thoracic 7-11 Minimally Invasive Percutaneous Posterior instrumented fusion with Bone Morphogenic Protein and Allograft;  Surgeon: Salas Guadalupe MD;  Location:  OR            Social History            Socioeconomic History    Marital status:    Tobacco Use    Smoking status: Never    Smokeless tobacco: Never   Substance and Sexual Activity    Alcohol use: Yes       Alcohol/week: 2.0 standard drinks of alcohol       Types: 2 Cans of beer per week    Drug use: Never      Social Determinants of Health           Financial Resource Strain: Low Risk  (9/6/2024)     Financial Resource Strain      Within the past 12 months, have you or your family members you live with been unable to get utilities (heat, electricity) when it was really needed?: No   Food Insecurity: Low Risk  (9/6/2024)     Food Insecurity      Within the past 12 months, did you worry that your food would run out before you got money to buy more?: No      Within the past 12 months, did the food you bought just not last and you  didn t have money to get more?: No   Transportation Needs: Low Risk  (9/6/2024)     Transportation Needs      Within the past 12 months, has lack of transportation kept you from medical appointments, getting your medicines, non-medical meetings or appointments, work, or from getting things that you need?: No     Received from [facility]     Social Connections   Interpersonal Safety: Low Risk  (9/24/2024)     Interpersonal Safety      Do you feel physically and emotionally safe where you currently live?: Yes      Within the past 12 months, have you been hit, slapped, kicked or otherwise physically hurt by someone?: No      Within the past 12 months, have you been humiliated or emotionally abused in other ways by your partner or ex-partner?: No   Housing Stability: Low Risk  (9/6/2024)     Housing Stability      Do you have housing? : Yes      Are you worried about losing your housing?: No         family history is not on file.                Physical Exam   There were no vitals taken for this visit.  Constitutional: Oriented to person, place, and time. Appears well-developed and well-nourished. Cooperative. No distress.   HENT: Head normocephalic and atraumatic.      Incision:  Clean, dry and intact.  No erythema, induration or fluctuance.  No drainage noted.   Neurological: alert and oriented to person, place, and time. CN II-XII grossly  intact        STRENGTH LEFT RIGHT   Deltoid 5 5   Bicep 5 5   Wrist Extensor 5 5   Tricep 5 5   Finger flexion 5 5   Finger abduction 5 5    5 5         Hip Flexion       5       5   Knee Extension 5 5   Ankle Dorsiflexion 5 5   Extensor Hallucis Longus 5 5   Plantar Flexion 5 5   Foot eversion 5 5   Foot inversion 5 5        Skin: Skin is warm, dry and intact.   Psychiatric: Normal mood and affect. Speech is normal and behavior is normal.        IMAGING:  Personally reviewed scoliosis x-rays dated 11/7/2024:    1. Postoperative changes of  T7-T11 fusion without evidence of hardware  complication.  2. No substantial coronal curvature of the spine.  3. No global coronal imbalance.  4. Very positive global sagittal imbalance.     ASSESSMENT:  Judah Leon is a 84 year old male s/p Thoracic 7-11 Minimally Invasive Percutaneous Posterior instrumented fusion with Dr. Salas Guadalupe on 9/9/2024     PLAN:  Do not do any bending, twisting, strenuous exercise, or heavy lifting (greater than 10 pounds). Be careful and ask for assistance when walking or going up and down stairs.      Ok to shower.      Please do not take any NSAIDs (advil, aleve, motrin, ibuprofen, celebrex, etc) as they inhibit bony fusion.     Please continue to wear brace as directed until discontinued by Neurosurgery provider.       If you should sustain new trauma or injury or note increased headache, weakness, speech or vision issues, confusion, or other neurologic changes please call 911 or present to your nearest emergency room for further evaluation.      Please follow-up with Dr Salas Guadalupe in 4-6 weeks with repeat x-rays     Please continue to follow-up with PCP for glucose management.                      I spent 25 minutes in patient care with greater than 50% spent in counseling and/or coordination of care.     I performed independent visualization of radiographic imaging and entered my own interpretation, reviewed and/or ordered tests in radiology           ASH Muñoz, CNP  Department of Neurosurgery  Pager: [phone]

## 2024-11-07 ENCOUNTER — ANCILLARY PROCEDURE (OUTPATIENT)
Dept: GENERAL RADIOLOGY | Facility: CLINIC | Age: 84
End: 2024-11-07
Attending: NURSE PRACTITIONER
Payer: COMMERCIAL

## 2024-11-07 ENCOUNTER — OFFICE VISIT (OUTPATIENT)
Dept: NEUROSURGERY | Facility: CLINIC | Age: 84
End: 2024-11-07
Payer: COMMERCIAL

## 2024-11-07 VITALS
SYSTOLIC BLOOD PRESSURE: 133 MMHG | OXYGEN SATURATION: 97 % | DIASTOLIC BLOOD PRESSURE: 76 MMHG | WEIGHT: 160 LBS | BODY MASS INDEX: 25.06 KG/M2 | HEART RATE: 78 BPM

## 2024-11-07 DIAGNOSIS — Z98.1 S/P FUSION OF THORACIC SPINE: Primary | ICD-10-CM

## 2024-11-07 DIAGNOSIS — Z98.1 S/P FUSION OF THORACIC SPINE: ICD-10-CM

## 2024-11-07 PROCEDURE — 72082 X-RAY EXAM ENTIRE SPI 2/3 VW: CPT | Performed by: STUDENT IN AN ORGANIZED HEALTH CARE EDUCATION/TRAINING PROGRAM

## 2024-11-07 PROCEDURE — 99024 POSTOP FOLLOW-UP VISIT: CPT | Performed by: NURSE PRACTITIONER

## 2024-11-07 NOTE — PATIENT INSTRUCTIONS
Do not do any bending, twisting, strenuous exercise, or heavy lifting (greater than 10 pounds). Be careful and ask for assistance when walking or going up and down stairs.      Ok to shower.      Please do not take any NSAIDs (advil, aleve, motrin, ibuprofen, celebrex, etc) as they inhibit bony fusion.     Please continue to wear brace as directed until discontinued by Neurosurgery provider.       If you should sustain new trauma or injury or note increased headache, weakness, speech or vision issues, confusion, or other neurologic changes please call 911 or present to your nearest emergency room for further evaluation.      Please follow-up with Dr Salas Guadalupe in 4-6 weeks with repeat x-rays     Please continue to follow-up with PCP for glucose management.

## 2024-11-07 NOTE — LETTER
11/7/2024       RE: Judah Leon  [address] [place] [address]     Dear Colleague,    Thank you for referring your patient, Judah Leon, to the [facility] [place] at [facility]. Please see a copy of my visit note below.         Neurosurgery Clinic Note     Chief Complaint: surgical follow-up      DATE OF VISIT: 11/7/2024      HPI: Judah Leon is a pleasant 84 year old male who is s/p Thoracic 7-11 Minimally Invasive Percutaneous Posterior instrumented fusion with Dr. Salas Guadalupe on 9/9/2024 after suffering a traumatic fall resulting in an unstable T8-9 fracture.  Following surgery the patient's hospitalization was complicated by delirium and hyperglycemia.  Endocrinology was consulted and management acute increase in blood glucose levels with insulin in the post-operative period. Endocrinology recommended patient continues his PTA diabetes regimen upon discharge to rehab.  Patient was discharged to rehab on 9/24/2024, he was discharged home on 9/30/2024.  He was last seen on 10/3/2024 and presents today for follow-up.      Currently, he states pain is controlled, currently not requiring anything for pain.  He does note pain on the left just above the belt line when he gets out of bed in the morning, states this resolves after walking.  Denies weakness in the lower extremities.  No issues with bowel or bladder function.   Continues to wear brace when out of bed.  States he tolerating ADLS and activity without issue.  Patient denies any fevers, chills, wound drainage, or other signs of infection from the surgical wound.               Review of Systems   Negative except in HPI     Past Medical History        Past Medical History:   Diagnosis Date     Cerebrovascular disease 06/07/2003     Formatting of this note might be different from the original.   R sided bleed 1997       Chronic kidney disease, stage 3b (H) 09/24/2024      Chronic rhinitis 08/14/2006     Controlled type 2 diabetes mellitus with stage 3 chronic kidney disease, without long-term current use of insulin (H) 06/07/2003     Formatting of this note might be different from the original.   DM Type2       Hemorrhage of gastrointestinal tract 08/14/2006     Formatting of this note might be different from the original.   2002       Hyperlipidemia with target LDL less than 100 08/12/2011     Formatting of this note might be different from the original.   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   Added automatically from request for surgery [id number]       Osteoarthritis of lumbar spine 07/03/2014     Formatting of this note might be different from the original.   DJD (degenerative joint disease) of lumbar spine       Primary hypertension 09/01/2022     S/P fusion of thoracic spine 09/09/2024     Thoracic compression fracture (H) 09/05/2024            Past Surgical History         Past Surgical History:   Procedure Laterality Date     OPTICAL TRACKING SYSTEM FUSION SPINE POSTERIOR THORACIC PERCUTANEOUS THREE+ LEVELS N/A 9/9/2024     Procedure: o-arm/stealth assisted Thoracic 7-11 Minimally Invasive Percutaneous Posterior instrumented fusion with Bone Morphogenic Protein and Allograft;  Surgeon: Salas Guadalupe MD;  Location:  OR            Social History            Socioeconomic History     Marital status:    Tobacco Use     Smoking status: Never     Smokeless tobacco: Never   Substance and Sexual Activity     Alcohol use: Yes       Alcohol/week: 2.0 standard drinks of alcohol       Types: 2 Cans of beer per week     Drug use: Never      Social Determinants of Health           Financial Resource Strain: Low Risk  (9/6/2024)     Financial Resource Strain       Within the past 12 months, have you or your family members you live  with been unable to get utilities (heat, electricity) when it was really needed?: No   Food Insecurity: Low Risk  (9/6/2024)     Food Insecurity       Within the past 12 months, did you worry that your food would run out before you got money to buy more?: No       Within the past 12 months, did the food you bought just not last and you didn t have money to get more?: No   Transportation Needs: Low Risk  (9/6/2024)     Transportation Needs       Within the past 12 months, has lack of transportation kept you from medical appointments, getting your medicines, non-medical meetings or appointments, work, or from getting things that you need?: No     Received from [facility]     Social Connections   Interpersonal Safety: Low Risk  (9/24/2024)     Interpersonal Safety       Do you feel physically and emotionally safe where you currently live?: Yes       Within the past 12 months, have you been hit, slapped, kicked or otherwise physically hurt by someone?: No       Within the past 12 months, have you been humiliated or emotionally abused in other ways by your partner or ex-partner?: No   Housing Stability: Low Risk  (9/6/2024)     Housing Stability       Do you have housing? : Yes       Are you worried about losing your housing?: No         family history is not on file.                Physical Exam   There were no vitals taken for this visit.  Constitutional: Oriented to person, place, and time. Appears well-developed and well-nourished. Cooperative. No distress.   HENT: Head normocephalic and atraumatic.      Incision:  Clean, dry and intact.  No erythema, induration or fluctuance.  No drainage noted.   Neurological: alert and oriented to person, place, and time. CN II-XII grossly  intact        STRENGTH LEFT RIGHT   Deltoid 5 5   Bicep 5 5   Wrist Extensor 5 5   Tricep 5 5   Finger flexion 5 5   Finger abduction 5 5    5 5         Hip Flexion       5       5   Knee Extension 5 5    Ankle Dorsiflexion 5 5   Extensor Hallucis Longus 5 5   Plantar Flexion 5 5   Foot eversion 5 5   Foot inversion 5 5        Skin: Skin is warm, dry and intact.   Psychiatric: Normal mood and affect. Speech is normal and behavior is normal.        IMAGING:  Personally reviewed scoliosis x-rays dated 11/7/2024:    1. Postoperative changes of T7-T11 fusion without evidence of hardware  complication.  2. No substantial coronal curvature of the spine.  3. No global coronal imbalance.  4. Very positive global sagittal imbalance.     ASSESSMENT:  Judah Leon is a 84 year old male s/p Thoracic 7-11 Minimally Invasive Percutaneous Posterior instrumented fusion with Dr. Salas Guadalupe on 9/9/2024     PLAN:  Do not do any bending, twisting, strenuous exercise, or heavy lifting (greater than 10 pounds). Be careful and ask for assistance when walking or going up and down stairs.      Ok to shower.      Please do not take any NSAIDs (advil, aleve, motrin, ibuprofen, celebrex, etc) as they inhibit bony fusion.     Please continue to wear brace as directed until discontinued by Neurosurgery provider.       If you should sustain new trauma or injury or note increased headache, weakness, speech or vision issues, confusion, or other neurologic changes please call 911 or present to your nearest emergency room for further evaluation.      Please follow-up with Dr Salas Guadalupe in 4-6 weeks with repeat x-rays     Please continue to follow-up with PCP for glucose management.                      I spent 25 minutes in patient care with greater than 50% spent in counseling and/or coordination of care.     I performed independent visualization of radiographic imaging and entered my own interpretation, reviewed and/or ordered tests in radiology           ASH Muñoz, CNP  Department of Neurosurgery  Pager: [phone]         Again, thank you for allowing me to participate in the care of your patient.      Sincerely,    Ivonne ARMANDO  ASH Mccullough CNP

## 2024-11-08 ENCOUNTER — TELEPHONE (OUTPATIENT)
Dept: NEUROSURGERY | Facility: CLINIC | Age: 84
End: 2024-11-08
Payer: COMMERCIAL

## 2024-11-08 NOTE — TELEPHONE ENCOUNTER
Called and scheduled appointment w/ Dr. Guadalupe and xrays prior per Ivonne Mccullough via task. -KB

## (undated) DEVICE — BUR STRK CARBIDE MATCH HEAD 3.0MM 5820-107-530C

## (undated) DEVICE — DRAPE IOBAN INCISE 13X13" 6640EZ

## (undated) DEVICE — DRAPE C-ARM W/STRAPS 42X72" 07-CA104

## (undated) DEVICE — SOL NACL 0.9% IRRIG 1000ML BOTTLE 2F7124

## (undated) DEVICE — PREP POVIDONE-IODINE 7.5% SCRUB 4OZ BOTTLE MDS093945

## (undated) DEVICE — DRAPE POUCH INSTRUMENT 1018

## (undated) DEVICE — SU VICRYL 2-0 CT-2 CR 8X18" J726D

## (undated) DEVICE — IMPLANTABLE DEVICE: Type: IMPLANTABLE DEVICE | Site: SPINE THORACIC | Status: NON-FUNCTIONAL

## (undated) DEVICE — IOM SUPPLIES/CASE FEE

## (undated) DEVICE — NAV PAK NDL TROCAR PK1011

## (undated) DEVICE — STPL SKIN 35W ROTATING HEAD PRW35

## (undated) DEVICE — SUCTION MANIFOLD NEPTUNE 2 SYS 4 PORT 0702-020-000

## (undated) DEVICE — LINEN TOWEL PACK X30 5481

## (undated) DEVICE — PACK NEURO MINOR UMMC SNE32MNMU4

## (undated) DEVICE — LINEN TOWEL PACK X6 WHITE 5487

## (undated) DEVICE — ESU ELEC BLADE 6" COATED/INSULATED E1455-6

## (undated) DEVICE — CATH TRAY FOLEY 16FR W/URINE METER STATLOCK 303316A

## (undated) DEVICE — SU VICRYL+ 0 27 UR6 VLT VCP603H

## (undated) DEVICE — SU VICRYL 0 CT-1 CR 8X18" J740D

## (undated) DEVICE — PREP POVIDONE-IODINE 10% SOLUTION 4OZ BOTTLE MDS093944

## (undated) DEVICE — ESU ELEC BLADE 2.75" COATED/INSULATED E1455

## (undated) DEVICE — PREP CHLORAPREP CLEAR 3ML 930400

## (undated) DEVICE — KIT PATIENT CARE JACKSON SPINE PACK 5808PV

## (undated) DEVICE — SUCTION MINISQUAIR SMOKE EVAC CAPTURE DEVICE SQ20012-01

## (undated) DEVICE — TUBING SMOKE EVAC 3/8"X10' 0702-045-023

## (undated) DEVICE — GLOVE BIOGEL PI SZ 7.5 40875

## (undated) DEVICE — CATH TRAY FOLEY SURESTEP 16FR W/URNE MTR STLK LATEX A303316A

## (undated) DEVICE — DRSG PRIMAPORE 03 1/8X6" 66000318

## (undated) DEVICE — DRAPE SLEEVE 599

## (undated) DEVICE — SPONGE COTTONOID NEURO 1/2"X1/2" 30-054

## (undated) DEVICE — PREP SKIN SCRUB TRAY 4461A

## (undated) DEVICE — ESU PENCIL SMOKE EVAC W/ROCKER SWITCH 0703-047-000

## (undated) DEVICE — ESU CORD BIPOLAR GREEN 10-4000

## (undated) DEVICE — ESU GROUND PAD ADULT W/CORD E7507

## (undated) DEVICE — MARKER SPHERES PASSIVE MEDT PACK 5 8801075

## (undated) DEVICE — DRAPE STERI TOWEL LG 1010

## (undated) DEVICE — DRAPE O ARM TUBE 9732722

## (undated) DEVICE — SPONGE COTTONOID 1/2X1" 20-05S

## (undated) DEVICE — DRAPE SHEET REV FOLD 3/4 9349

## (undated) DEVICE — NDL SPINAL 22GA 3.5" QUINCKE 405181

## (undated) DEVICE — SOL WATER IRRIG 1000ML BOTTLE 2F7114

## (undated) DEVICE — COVER BIG CASE BACK TABLE 6'X2' 420-HD-S

## (undated) RX ORDER — LIDOCAINE HYDROCHLORIDE 10 MG/ML
INJECTION, SOLUTION EPIDURAL; INFILTRATION; INTRACAUDAL; PERINEURAL
Status: DISPENSED
Start: 2024-09-09

## (undated) RX ORDER — LIDOCAINE HYDROCHLORIDE AND EPINEPHRINE 10; 10 MG/ML; UG/ML
INJECTION, SOLUTION INFILTRATION; PERINEURAL
Status: DISPENSED
Start: 2024-09-09

## (undated) RX ORDER — LABETALOL HYDROCHLORIDE 5 MG/ML
INJECTION, SOLUTION INTRAVENOUS
Status: DISPENSED
Start: 2024-09-09

## (undated) RX ORDER — FENTANYL CITRATE 50 UG/ML
INJECTION, SOLUTION INTRAMUSCULAR; INTRAVENOUS
Status: DISPENSED
Start: 2024-09-09

## (undated) RX ORDER — CEFAZOLIN SODIUM/WATER 2 G/20 ML
SYRINGE (ML) INTRAVENOUS
Status: DISPENSED
Start: 2024-09-09

## (undated) RX ORDER — IOPAMIDOL 755 MG/ML
INJECTION, SOLUTION INTRAVASCULAR
Status: DISPENSED
Start: 2024-09-09

## (undated) RX ORDER — CEFAZOLIN SODIUM 1 G/3ML
INJECTION, POWDER, FOR SOLUTION INTRAMUSCULAR; INTRAVENOUS
Status: DISPENSED
Start: 2024-09-09

## (undated) RX ORDER — HEPARIN SODIUM 1000 [USP'U]/ML
INJECTION, SOLUTION INTRAVENOUS; SUBCUTANEOUS
Status: DISPENSED
Start: 2024-09-09